# Patient Record
Sex: FEMALE | Race: BLACK OR AFRICAN AMERICAN | NOT HISPANIC OR LATINO | Employment: PART TIME | URBAN - METROPOLITAN AREA
[De-identification: names, ages, dates, MRNs, and addresses within clinical notes are randomized per-mention and may not be internally consistent; named-entity substitution may affect disease eponyms.]

---

## 2017-02-06 ENCOUNTER — OFFICE VISIT (OUTPATIENT)
Dept: ORTHOPEDIC SURGERY | Facility: CLINIC | Age: 62
End: 2017-02-06

## 2017-02-06 VITALS — BODY MASS INDEX: 30.32 KG/M2 | WEIGHT: 182 LBS | TEMPERATURE: 97.8 F | HEIGHT: 65 IN

## 2017-02-06 DIAGNOSIS — M25.552 HIP PAIN, LEFT: Primary | ICD-10-CM

## 2017-02-06 DIAGNOSIS — M25.561 CHRONIC PAIN OF RIGHT KNEE: ICD-10-CM

## 2017-02-06 DIAGNOSIS — G89.29 CHRONIC PAIN OF RIGHT KNEE: ICD-10-CM

## 2017-02-06 PROCEDURE — 99204 OFFICE O/P NEW MOD 45 MIN: CPT | Performed by: ORTHOPAEDIC SURGERY

## 2017-02-06 PROCEDURE — 73502 X-RAY EXAM HIP UNI 2-3 VIEWS: CPT | Performed by: ORTHOPAEDIC SURGERY

## 2017-02-06 PROCEDURE — 73562 X-RAY EXAM OF KNEE 3: CPT | Performed by: ORTHOPAEDIC SURGERY

## 2017-02-06 RX ORDER — DULOXETIN HYDROCHLORIDE 30 MG/1
CAPSULE, DELAYED RELEASE ORAL
Refills: 2 | COMMUNITY
Start: 2017-01-22 | End: 2017-07-13

## 2017-02-06 NOTE — PROGRESS NOTES
New Knee      Patient: Kelsey Nicholson        YOB: 1955    Medical Record Number: [id number]        Chief Complaints:  Right knee and bilateral hips      History of Present Illness: This is a  61 y.o. presents complaining of really right greater than left knee pain and left hip pain.  Her knee pain is been ongoing for 3 months no history of injury or change in activity that she can recall it is primarily medial and lateral.  She does have swelling she has night pain it is a 7 out of 10 she has tried an injection previously in December which helped a little bit she was also placed on Cymbalta she states for her knee she cannot squat has a hard time with stairs.  She is a .  She describes her symptoms as severe constant aching with clicking popping snapping pain with sitting other activities.  Her past medical history marked for depression anxiety.  With regard to her left hip is been ongoing for several years it is lateral hip and buttock pain she cannot associated with activity or with an injury is described as moderate to severe and intermittent.    Allergies:   Allergies   Allergen Reactions   • Statins    • Sulfa Antibiotics    • Oxycodone-Acetaminophen Rash       Medications:   Home Medications:  Current Outpatient Prescriptions on File Prior to Visit   Medication Sig   • Ascorbic Acid (VITAMIN C) 1000 MG tablet Take 1,000 mg by mouth daily.   • chlorthalidone (HYGROTON) 25 MG tablet Take 1 tablet by mouth Daily.   • Cholecalciferol (KP VITAMIN D) 1000 UNITS capsule Take 1,000 Units by mouth daily.   • fluticasone (FLONASE) 50 MCG/ACT nasal spray SHAKE WELL AND USE 2 SPRAYS IN EACH NOSTRIL DAILY   • fluticasone-salmeterol (ADVAIR) 250-50 MCG/DOSE DISKUS Inhale 1 puff 2 (two) times a day. Use one inhalation by mouth twice daily   • gabapentin (NEURONTIN) 300 MG capsule TAKE ONE CAPSULE BY MOUTH THREE TIMES DAILY AS NEEDED   • Ginger, Zingiber officinalis, (GINGER ROOT PO) Take by  mouth.   • Multiple Vitamin (MULTIVITAMIN) tablet Take 1 tablet by mouth daily.   • pantoprazole (PROTONIX) 40 MG EC tablet TAKE 1 TABLET BY MOUTH DAILY EVERY MORNING AND EVERY EVENING   • potassium chloride (K-DUR,KLOR-CON) 10 MEQ CR tablet Take 1 tablet by mouth 2 (Two) Times a Day.   • EPINEPHrine (EPIPEN) 0.3 MG/0.3ML solution auto-injector 0.3 mg once. Inject intramuscularly as directed   • lisinopril (PRINIVIL,ZESTRIL) 10 MG tablet TAKE 1 TABLET BY MOUTH DAILY   • oxybutynin XL (DITROPAN-XL) 10 MG 24 hr tablet    • Scar Treatment Products (RECEDO) gel Apply  topically.     No current facility-administered medications on file prior to visit.      Current Medications:  Scheduled Meds:  Continuous Infusions:  No current facility-administered medications for this visit.   PRN Meds:.    Past Medical History   Diagnosis Date   • Abdominal pain    • Cough    • GERD (gastroesophageal reflux disease)    • Hyperplastic colon polyp    • Irritable bowel syndrome    • Pain of left thumb         Past Surgical History   Procedure Laterality Date   • Breast biopsy     • Cholecystectomy  12/2013   • Hysterectomy     • Urethral suspension     • Oophorectomy     • Rotator cuff repair Left 11/2014        Social History     Occupational History   • Not on file.     Social History Main Topics   • Smoking status: Never Smoker   • Smokeless tobacco: Not on file   • Alcohol use No   • Drug use: Defer   • Sexual activity: Defer    Social History     Social History Narrative        Family History   Problem Relation Age of Onset   • Cancer Father    • Diabetes Father    • Hypertension Father    • Stroke Father    • Heart disease Father              Review of Systems: 14 point review of systems are remarkable for the pertinent positives listed in chart by the patient the remainder are negative    Review of Systems      Physical Exam: 61 y.o. female  General Appearance:    Alert, cooperative, in no acute distress                 Vitals:     "02/06/17 1504   Temp: 97.8 °F (36.6 °C)   TempSrc: Temporal Artery    Weight: 182 lb (82.6 kg)   Height: 65\" (165.1 cm)      Patient is alert and read ×3 no acute distress appears her above-listed at height weight and age.  Affect is normal respiratory rate is normal unlabored. Heart rate regular rate rhythm, sclera, dentition and hearing are normal for the purpose of this exam.        Ortho Exam Physical exam of the right  knee reveals no effusion no redness.  The patient does have tenderness about the medial l joint line.  No tenderness about the lateral l joint line.  A negative bounce home and a positive l medial Ramya.    Patient has a stable ligamentous exam.  The patient has a negative Lachman and negative anterior drawer and a negative pivot shift.  Quads are reasonable and symmetric bilaterally.  Calf is soft and nontender.  There is no overlying skin changes no lymphedema lymphadenopathy.  Patient has good hip range of motion full symmetric and asymptomatic and a normal ankle exam.  She has good distal pulses and sensation distally is intact      Physical exam of the  Left hip.  She has no overlying skin changes no lymphedema lymphadenopathy she has distinct Tenderness over the greater trochanter.  She has a negative straight leg raise negative Lasegue's some pain with Carisa's but in the area the greater trochanter.  She has good range of motion all directions with some pain in range of internal rotation at primarily the area the greater trochanter.    Procedures             Radiology:   AP, Lateral and merchant views of the right knee  were ordered/reviewed to evauateknee pain. I have no comp films.  These show some mild patellofemoral OA otherwise good meds were joint space in neutral alignment.  AP the pelvis lateral left hip were taken to evaluate her hip pain have no comparative films.  These show some mild degenerative changes at the at the hip joint no other acute bony pathology is " seen.      Assessment/Plan: Right knee pain.  I am concerned about meniscal pathology.  She's had an injection the past which she did not get relief from I think the next step would be an MRI she is exhausted most conservative care that I have will proceed with an MRI and she can call me after that test.  With regard to her left hip I think this is more bursitis I did show her how to stretch this I will start her on some Mobic with strict precautions start her into some physical therapy and I will see how she does if she fails to improve with that we would consider an injection versus other means of study/evaluation

## 2017-02-07 PROBLEM — M25.552 HIP PAIN, LEFT: Status: ACTIVE | Noted: 2017-02-07

## 2017-02-07 PROBLEM — M25.561 CHRONIC PAIN OF RIGHT KNEE: Status: ACTIVE | Noted: 2017-02-07

## 2017-02-07 PROBLEM — G89.29 CHRONIC PAIN OF RIGHT KNEE: Status: ACTIVE | Noted: 2017-02-07

## 2017-02-07 RX ORDER — MELOXICAM 15 MG/1
TABLET ORAL
Qty: 30 TABLET | Refills: 3 | Status: SHIPPED | OUTPATIENT
Start: 2017-02-07 | End: 2017-02-07 | Stop reason: SDUPTHER

## 2017-02-08 RX ORDER — MELOXICAM 15 MG/1
TABLET ORAL
Qty: 90 TABLET | Refills: 3 | Status: SHIPPED | OUTPATIENT
Start: 2017-02-08 | End: 2017-07-13

## 2017-02-15 DIAGNOSIS — K58.9 IRRITABLE BOWEL SYNDROME WITHOUT DIARRHEA: ICD-10-CM

## 2017-02-15 RX ORDER — GABAPENTIN 300 MG/1
CAPSULE ORAL
Qty: 270 CAPSULE | Refills: 0 | Status: SHIPPED | OUTPATIENT
Start: 2017-02-15 | End: 2017-07-13 | Stop reason: SDUPTHER

## 2017-02-23 ENCOUNTER — OFFICE VISIT (OUTPATIENT)
Dept: ORTHOPEDIC SURGERY | Facility: CLINIC | Age: 62
End: 2017-02-23

## 2017-02-23 DIAGNOSIS — M25.561 CHRONIC PAIN OF RIGHT KNEE: ICD-10-CM

## 2017-02-23 DIAGNOSIS — G89.29 CHRONIC PAIN OF RIGHT KNEE: ICD-10-CM

## 2017-02-23 PROCEDURE — 73721 MRI JNT OF LWR EXTRE W/O DYE: CPT | Performed by: ORTHOPAEDIC SURGERY

## 2017-02-28 ENCOUNTER — TELEPHONE (OUTPATIENT)
Dept: ORTHOPEDIC SURGERY | Facility: CLINIC | Age: 62
End: 2017-02-28

## 2017-03-01 ENCOUNTER — TELEPHONE (OUTPATIENT)
Dept: ORTHOPEDIC SURGERY | Facility: CLINIC | Age: 62
End: 2017-03-01

## 2017-03-02 NOTE — TELEPHONE ENCOUNTER
Called [facility] telling patient we have her MRI results. If she calls tell her per Dr. Chang it's Normal and she can inject the site if she wants-lck

## 2017-03-08 NOTE — TELEPHONE ENCOUNTER
PER [facility]  MRI RESULTS SHOW BURSITIS. SHE WILL NEED TO MAKE AN FOLLOW UP APPT WITH INJECTION AT THE BURSITIS.

## 2017-03-15 RX ORDER — CHLORTHALIDONE 25 MG/1
TABLET ORAL
Qty: 90 TABLET | Refills: 0 | Status: SHIPPED | OUTPATIENT
Start: 2017-03-15 | End: 2017-06-10 | Stop reason: SDUPTHER

## 2017-04-03 ENCOUNTER — OFFICE VISIT (OUTPATIENT)
Dept: FAMILY MEDICINE CLINIC | Facility: CLINIC | Age: 62
End: 2017-04-03

## 2017-04-03 VITALS
TEMPERATURE: 98.5 F | WEIGHT: 180 LBS | DIASTOLIC BLOOD PRESSURE: 98 MMHG | SYSTOLIC BLOOD PRESSURE: 146 MMHG | OXYGEN SATURATION: 99 % | HEIGHT: 65 IN | BODY MASS INDEX: 29.99 KG/M2 | HEART RATE: 88 BPM

## 2017-04-03 DIAGNOSIS — J40 BRONCHITIS: Primary | ICD-10-CM

## 2017-04-03 DIAGNOSIS — J06.9 VIRAL UPPER RESPIRATORY TRACT INFECTION: ICD-10-CM

## 2017-04-03 DIAGNOSIS — J01.90 ACUTE NON-RECURRENT SINUSITIS, UNSPECIFIED LOCATION: ICD-10-CM

## 2017-04-03 PROCEDURE — 99213 OFFICE O/P EST LOW 20 MIN: CPT | Performed by: FAMILY MEDICINE

## 2017-04-03 RX ORDER — PROMETHAZINE HYDROCHLORIDE AND CODEINE PHOSPHATE 6.25; 1 MG/5ML; MG/5ML
5 SYRUP ORAL EVERY 4 HOURS PRN
Qty: 180 ML | Refills: 0 | Status: SHIPPED | OUTPATIENT
Start: 2017-04-03 | End: 2017-07-13

## 2017-04-03 RX ORDER — CEFDINIR 300 MG/1
300 CAPSULE ORAL 2 TIMES DAILY
Qty: 10 CAPSULE | Refills: 0 | Status: SHIPPED | OUTPATIENT
Start: 2017-04-03 | End: 2017-07-13

## 2017-04-03 RX ORDER — METHYLPREDNISOLONE 4 MG/1
TABLET ORAL
Qty: 21 TABLET | Refills: 0 | Status: SHIPPED | OUTPATIENT
Start: 2017-04-03 | End: 2017-07-13

## 2017-04-03 NOTE — PROGRESS NOTES
"Subjective Patients with a productive cough that has been preset for one week. Using Mucinex.     Kelsey Nicholson is a 61 y.o. female.     Chief Complaint   Patient presents with   • Cough     Social History   Substance Use Topics   • Smoking status: Never Smoker   • Smokeless tobacco: None   • Alcohol use No       History of Present Illness     Acute Bronchitis: Patient presents for presents evaluation of chills, bilateral ear pain, facial pain wheezing, fever, headache  , myalgias, nasal congestion, productive cough and sore throat. Symptoms began 1 week ago and are gradually worsening since that time.  Past history is significant for asthma and occasional episodes of bronchitis.Used mucinex DM, bishnu selzer cold and sinus.Cough is keeping her up as well. Coughing up a lot of phlegm. She's been using her advair regularly. Vicks.     The following portions of the patient's history were reviewed and updated as appropriate: PMHroutine: Social history , Allergies, Current Medications, Active Problem List and Health Maintenance     Review of Systems   Constitutional: Positive for fatigue and fever.   HENT: Positive for sinus pressure and sore throat.    Respiratory: Positive for cough.        Objective   Vitals:    04/03/17 1408   BP: 146/98   Pulse: 88   Temp: 98.5 °F (36.9 °C)   SpO2: 99%   Weight: 180 lb (81.6 kg)   Height: 65\" (165.1 cm)     Body mass index is 29.95 kg/(m^2).    Physical Exam   Constitutional: She appears well-developed and well-nourished.   HENT:   Head: Normocephalic and atraumatic.   Right Ear: Tympanic membrane, external ear and ear canal normal.   Left Ear: Tympanic membrane, external ear and ear canal normal.   Mouth/Throat: Oropharynx is clear and moist.   Eyes: Conjunctivae are normal. Right eye exhibits no discharge. Left eye exhibits no discharge.   Neck: Normal range of motion. Neck supple. No thyromegaly present.   Cardiovascular: Normal rate, regular rhythm and normal heart sounds.  "   Pulmonary/Chest: Effort normal and breath sounds normal. No respiratory distress. She has no wheezes. She has no rales.   Lymphadenopathy:     She has no cervical adenopathy.   Skin: Skin is warm and dry.   Psychiatric: She has a normal mood and affect. Judgment normal.   Vitals reviewed.      Assessment/Plan   Problem List Items Addressed This Visit     None      Visit Diagnoses     Bronchitis    -  Primary    Relevant Medications    cefdinir (OMNICEF) 300 MG capsule    MethylPREDNISolone (MEDROL) 4 MG tablet    promethazine-codeine (PHENERGAN with CODEINE) 6.25-10 MG/5ML syrup    Viral upper respiratory tract infection        Relevant Medications    cefdinir (OMNICEF) 300 MG capsule    MethylPREDNISolone (MEDROL) 4 MG tablet    promethazine-codeine (PHENERGAN with CODEINE) 6.25-10 MG/5ML syrup    Acute non-recurrent sinusitis, unspecified location        Relevant Medications    cefdinir (OMNICEF) 300 MG capsule    MethylPREDNISolone (MEDROL) 4 MG tablet        The patient has read and signed the [facility] Controlled Substance Contract.  I will continue to see patient for regular follow up appointments. The patient is aware of the potential for addiction and dependence.  She is at low risk for dependence or diversion. This is for short term use only. Patient is aware of that. A Devan is obtained today.

## 2017-04-22 DIAGNOSIS — I10 BENIGN ESSENTIAL HYPERTENSION: ICD-10-CM

## 2017-04-22 DIAGNOSIS — R07.2 PRECORDIAL PAIN: ICD-10-CM

## 2017-04-24 RX ORDER — POTASSIUM CHLORIDE 750 MG/1
TABLET, EXTENDED RELEASE ORAL
Qty: 90 TABLET | Refills: 0 | Status: SHIPPED | OUTPATIENT
Start: 2017-04-24 | End: 2017-07-13 | Stop reason: SDUPTHER

## 2017-04-24 RX ORDER — PANTOPRAZOLE SODIUM 40 MG/1
TABLET, DELAYED RELEASE ORAL
Qty: 180 TABLET | Refills: 0 | Status: SHIPPED | OUTPATIENT
Start: 2017-04-24 | End: 2017-07-13

## 2017-06-12 RX ORDER — CHLORTHALIDONE 25 MG/1
TABLET ORAL
Qty: 30 TABLET | Refills: 0 | Status: SHIPPED | OUTPATIENT
Start: 2017-06-12 | End: 2017-07-12 | Stop reason: SDUPTHER

## 2017-06-12 RX ORDER — CHLORTHALIDONE 25 MG/1
TABLET ORAL
Qty: 90 TABLET | Refills: 0 | OUTPATIENT
Start: 2017-06-12

## 2017-07-12 RX ORDER — CHLORTHALIDONE 25 MG/1
TABLET ORAL
Qty: 30 TABLET | Refills: 0 | Status: SHIPPED | OUTPATIENT
Start: 2017-07-12 | End: 2017-07-13 | Stop reason: SDUPTHER

## 2017-07-13 ENCOUNTER — OFFICE VISIT (OUTPATIENT)
Dept: FAMILY MEDICINE CLINIC | Facility: CLINIC | Age: 62
End: 2017-07-13

## 2017-07-13 VITALS
SYSTOLIC BLOOD PRESSURE: 122 MMHG | WEIGHT: 188 LBS | OXYGEN SATURATION: 100 % | BODY MASS INDEX: 31.32 KG/M2 | HEART RATE: 67 BPM | DIASTOLIC BLOOD PRESSURE: 86 MMHG | HEIGHT: 65 IN

## 2017-07-13 DIAGNOSIS — Z00.00 HEALTHCARE MAINTENANCE: ICD-10-CM

## 2017-07-13 DIAGNOSIS — I10 BENIGN ESSENTIAL HYPERTENSION: Primary | ICD-10-CM

## 2017-07-13 DIAGNOSIS — K58.9 IRRITABLE BOWEL SYNDROME WITHOUT DIARRHEA: ICD-10-CM

## 2017-07-13 DIAGNOSIS — E78.2 MIXED HYPERLIPIDEMIA: ICD-10-CM

## 2017-07-13 PROCEDURE — 99213 OFFICE O/P EST LOW 20 MIN: CPT | Performed by: FAMILY MEDICINE

## 2017-07-13 RX ORDER — GABAPENTIN 300 MG/1
300 CAPSULE ORAL 3 TIMES DAILY
Qty: 270 CAPSULE | Refills: 1 | Status: SHIPPED | OUTPATIENT
Start: 2017-07-13 | End: 2019-05-08

## 2017-07-13 RX ORDER — POTASSIUM CHLORIDE 750 MG/1
10 TABLET, EXTENDED RELEASE ORAL 2 TIMES DAILY
Qty: 180 TABLET | Refills: 1 | Status: SHIPPED | OUTPATIENT
Start: 2017-07-13 | End: 2018-05-16 | Stop reason: SDUPTHER

## 2017-07-13 RX ORDER — CHLORTHALIDONE 25 MG/1
25 TABLET ORAL DAILY
Qty: 30 TABLET | Refills: 0 | Status: SHIPPED | OUTPATIENT
Start: 2017-07-13 | End: 2017-09-10 | Stop reason: SDUPTHER

## 2017-07-13 NOTE — PROGRESS NOTES
"Kelsey Nicholson is a 62 y.o. female.  Seen 07/13/2017    Assessment/Plan   Problem List Items Addressed This Visit        Cardiovascular and Mediastinum    Benign essential hypertension - Primary    Relevant Medications    potassium chloride (K-DUR,KLOR-CON) 10 MEQ CR tablet    chlorthalidone (HYGROTON) 25 MG tablet    Hyperlipidemia       Digestive    Irritable bowel syndrome (IBS)    Relevant Medications    gabapentin (NEURONTIN) 300 MG capsule       Other    Healthcare maintenance    Relevant Orders    Comprehensive metabolic panel    Lipid Panel With LDL/HDL Ratio    CBC and Differential             Return in about 6 months (around 1/13/2018).  There are no Patient Instructions on file for this visit.    Vitals:    07/13/17 1150   BP: 122/86   Pulse: 67   SpO2: 100%   Weight: 188 lb (85.3 kg)   Height: 65\" (165.1 cm)     Body mass index is 31.28 kg/(m^2).    Subjective     Chief Complaint   Patient presents with   • Hypertension   • Med Refill     Social History   Substance Use Topics   • Smoking status: Never Smoker   • Smokeless tobacco: Never Used   • Alcohol use No       History of Present Illness     The following portions of the patient's history were reviewed and updated as appropriate:PMHroutine: Social history , Allergies, Current Medications, Active Problem List and Health Maintenance    Review of Systems   Constitutional: Negative for activity change, appetite change, chills, fatigue, fever and unexpected weight change.   HENT: Negative for congestion, ear pain, hearing loss, nosebleeds, rhinorrhea and sore throat.    Eyes: Negative for pain, redness and visual disturbance.   Respiratory: Negative for cough, shortness of breath and wheezing.    Cardiovascular: Negative for chest pain, palpitations and leg swelling.   Gastrointestinal: Negative for abdominal pain, blood in stool, constipation, diarrhea, nausea and vomiting.   Endocrine: Negative for cold intolerance and heat intolerance. "   Genitourinary: Negative for difficulty urinating, dysuria, frequency, hematuria, pelvic pain, urgency and vaginal discharge.   Musculoskeletal: Negative for arthralgias, back pain and joint swelling.   Skin: Negative for rash and wound.   Neurological: Negative for dizziness, weakness, numbness and headaches.   Hematological: Does not bruise/bleed easily.   Psychiatric/Behavioral: Negative for dysphoric mood, sleep disturbance and suicidal ideas. The patient is not nervous/anxious.        Objective   Physical Exam   Constitutional: She is oriented to person, place, and time. Vital signs are normal. She appears well-developed and well-nourished. No distress.   HENT:   Head: Normocephalic.   Cardiovascular: Normal rate, regular rhythm and normal heart sounds.    Pulmonary/Chest: Effort normal and breath sounds normal.   Neurological: She is alert and oriented to person, place, and time. Gait normal.   Psychiatric: She has a normal mood and affect. Her behavior is normal. Judgment and thought content normal.   Vitals reviewed.

## 2017-07-20 DIAGNOSIS — R07.2 PRECORDIAL PAIN: ICD-10-CM

## 2017-07-20 RX ORDER — PANTOPRAZOLE SODIUM 40 MG/1
TABLET, DELAYED RELEASE ORAL
Qty: 180 TABLET | Refills: 0 | Status: SHIPPED | OUTPATIENT
Start: 2017-07-20 | End: 2017-11-06 | Stop reason: SDUPTHER

## 2017-08-16 LAB
ALBUMIN SERPL-MCNC: 4.6 G/DL (ref 3.5–5.2)
ALBUMIN/GLOB SERPL: 1.6 G/DL
ALP SERPL-CCNC: 89 U/L (ref 39–117)
ALT SERPL-CCNC: 33 U/L (ref 1–33)
AST SERPL-CCNC: 33 U/L (ref 1–32)
BASOPHILS # BLD AUTO: 0.05 10*3/MM3 (ref 0–0.2)
BASOPHILS NFR BLD AUTO: 0.6 % (ref 0–1.5)
BILIRUB SERPL-MCNC: 0.4 MG/DL (ref 0.1–1.2)
BUN SERPL-MCNC: 14 MG/DL (ref 8–23)
BUN/CREAT SERPL: 14.3 (ref 7–25)
CALCIUM SERPL-MCNC: 10.2 MG/DL (ref 8.6–10.5)
CHLORIDE SERPL-SCNC: 101 MMOL/L (ref 98–107)
CHOLEST SERPL-MCNC: 292 MG/DL (ref 0–200)
CO2 SERPL-SCNC: 30.2 MMOL/L (ref 22–29)
CREAT SERPL-MCNC: 0.98 MG/DL (ref 0.57–1)
EOSINOPHIL # BLD AUTO: 0.39 10*3/MM3 (ref 0–0.7)
EOSINOPHIL NFR BLD AUTO: 4.3 % (ref 0.3–6.2)
ERYTHROCYTE [DISTWIDTH] IN BLOOD BY AUTOMATED COUNT: 13.9 % (ref 11.7–13)
GLOBULIN SER CALC-MCNC: 2.8 GM/DL
GLUCOSE SERPL-MCNC: 100 MG/DL (ref 65–99)
HCT VFR BLD AUTO: 42.7 % (ref 35.6–45.5)
HDLC SERPL-MCNC: 47 MG/DL (ref 40–60)
HGB BLD-MCNC: 13.3 G/DL (ref 11.9–15.5)
IMM GRANULOCYTES # BLD: 0.02 10*3/MM3 (ref 0–0.03)
IMM GRANULOCYTES NFR BLD: 0.2 % (ref 0–0.5)
LDLC SERPL CALC-MCNC: 204 MG/DL (ref 0–100)
LDLC/HDLC SERPL: 4.35 {RATIO}
LYMPHOCYTES # BLD AUTO: 3.76 10*3/MM3 (ref 0.9–4.8)
LYMPHOCYTES NFR BLD AUTO: 41.4 % (ref 19.6–45.3)
MCH RBC QN AUTO: 30.8 PG (ref 26.9–32)
MCHC RBC AUTO-ENTMCNC: 31.1 G/DL (ref 32.4–36.3)
MCV RBC AUTO: 98.8 FL (ref 80.5–98.2)
MONOCYTES # BLD AUTO: 0.5 10*3/MM3 (ref 0.2–1.2)
MONOCYTES NFR BLD AUTO: 5.5 % (ref 5–12)
NEUTROPHILS # BLD AUTO: 4.37 10*3/MM3 (ref 1.9–8.1)
NEUTROPHILS NFR BLD AUTO: 48 % (ref 42.7–76)
PLATELET # BLD AUTO: 394 10*3/MM3 (ref 140–500)
POTASSIUM SERPL-SCNC: 3.5 MMOL/L (ref 3.5–5.2)
PROT SERPL-MCNC: 7.4 G/DL (ref 6–8.5)
RBC # BLD AUTO: 4.32 10*6/MM3 (ref 3.9–5.2)
SODIUM SERPL-SCNC: 146 MMOL/L (ref 136–145)
TRIGL SERPL-MCNC: 203 MG/DL (ref 0–150)
VLDLC SERPL CALC-MCNC: 40.6 MG/DL (ref 5–40)
WBC # BLD AUTO: 9.09 10*3/MM3 (ref 4.5–10.7)

## 2017-08-17 PROBLEM — R73.9 HYPERGLYCEMIA: Status: ACTIVE | Noted: 2017-08-17

## 2017-09-10 DIAGNOSIS — I10 BENIGN ESSENTIAL HYPERTENSION: ICD-10-CM

## 2017-09-11 RX ORDER — CHLORTHALIDONE 25 MG/1
TABLET ORAL
Qty: 90 TABLET | Refills: 0 | Status: SHIPPED | OUTPATIENT
Start: 2017-09-11 | End: 2017-12-01 | Stop reason: SDUPTHER

## 2017-10-15 DIAGNOSIS — K58.9 IRRITABLE BOWEL SYNDROME WITHOUT DIARRHEA: ICD-10-CM

## 2017-10-17 DIAGNOSIS — J45.909 REACTIVE AIRWAY DISEASE WITHOUT COMPLICATION, UNSPECIFIED ASTHMA SEVERITY, UNSPECIFIED WHETHER PERSISTENT: ICD-10-CM

## 2017-10-19 RX ORDER — GABAPENTIN 300 MG/1
CAPSULE ORAL
Qty: 270 CAPSULE | Refills: 1 | OUTPATIENT
Start: 2017-10-19 | End: 2018-03-26 | Stop reason: SDUPTHER

## 2017-11-06 DIAGNOSIS — R07.2 PRECORDIAL PAIN: ICD-10-CM

## 2017-11-08 RX ORDER — PANTOPRAZOLE SODIUM 40 MG/1
TABLET, DELAYED RELEASE ORAL
Qty: 180 TABLET | Refills: 0 | Status: SHIPPED | OUTPATIENT
Start: 2017-11-08 | End: 2018-02-03 | Stop reason: SDUPTHER

## 2017-12-01 DIAGNOSIS — I10 BENIGN ESSENTIAL HYPERTENSION: ICD-10-CM

## 2017-12-04 RX ORDER — CHLORTHALIDONE 25 MG/1
TABLET ORAL
Qty: 90 TABLET | Refills: 0 | Status: SHIPPED | OUTPATIENT
Start: 2017-12-04 | End: 2018-03-03 | Stop reason: SDUPTHER

## 2018-02-03 DIAGNOSIS — R07.2 PRECORDIAL PAIN: ICD-10-CM

## 2018-02-05 RX ORDER — PANTOPRAZOLE SODIUM 40 MG/1
TABLET, DELAYED RELEASE ORAL
Qty: 180 TABLET | Refills: 0 | Status: SHIPPED | OUTPATIENT
Start: 2018-02-05 | End: 2018-03-26 | Stop reason: SDUPTHER

## 2018-03-03 DIAGNOSIS — I10 BENIGN ESSENTIAL HYPERTENSION: ICD-10-CM

## 2018-03-05 DIAGNOSIS — I10 BENIGN ESSENTIAL HYPERTENSION: ICD-10-CM

## 2018-03-05 RX ORDER — CHLORTHALIDONE 25 MG/1
TABLET ORAL
Qty: 90 TABLET | Refills: 0 | OUTPATIENT
Start: 2018-03-05

## 2018-03-05 RX ORDER — CHLORTHALIDONE 25 MG/1
TABLET ORAL
Qty: 30 TABLET | Refills: 0 | Status: SHIPPED | OUTPATIENT
Start: 2018-03-05 | End: 2018-03-26 | Stop reason: SDUPTHER

## 2018-03-26 ENCOUNTER — OFFICE VISIT (OUTPATIENT)
Dept: INTERNAL MEDICINE | Facility: CLINIC | Age: 63
End: 2018-03-26

## 2018-03-26 VITALS
HEART RATE: 80 BPM | WEIGHT: 189 LBS | SYSTOLIC BLOOD PRESSURE: 130 MMHG | BODY MASS INDEX: 31.49 KG/M2 | TEMPERATURE: 98 F | DIASTOLIC BLOOD PRESSURE: 70 MMHG | OXYGEN SATURATION: 97 % | HEIGHT: 65 IN

## 2018-03-26 DIAGNOSIS — E55.9 VITAMIN D DEFICIENCY: ICD-10-CM

## 2018-03-26 DIAGNOSIS — J30.2 CHRONIC SEASONAL ALLERGIC RHINITIS, UNSPECIFIED TRIGGER: ICD-10-CM

## 2018-03-26 DIAGNOSIS — I10 BENIGN ESSENTIAL HYPERTENSION: Primary | ICD-10-CM

## 2018-03-26 DIAGNOSIS — K58.9 IRRITABLE BOWEL SYNDROME WITHOUT DIARRHEA: ICD-10-CM

## 2018-03-26 DIAGNOSIS — K21.9 GASTROESOPHAGEAL REFLUX DISEASE, ESOPHAGITIS PRESENCE NOT SPECIFIED: ICD-10-CM

## 2018-03-26 DIAGNOSIS — E66.9 CLASS 1 OBESITY WITHOUT SERIOUS COMORBIDITY WITH BODY MASS INDEX (BMI) OF 31.0 TO 31.9 IN ADULT, UNSPECIFIED OBESITY TYPE: ICD-10-CM

## 2018-03-26 DIAGNOSIS — Z11.59 NEED FOR HEPATITIS C SCREENING TEST: ICD-10-CM

## 2018-03-26 PROCEDURE — 99214 OFFICE O/P EST MOD 30 MIN: CPT | Performed by: FAMILY MEDICINE

## 2018-03-26 RX ORDER — CHLORTHALIDONE 25 MG/1
25 TABLET ORAL DAILY
Qty: 90 TABLET | Refills: 1 | Status: SHIPPED | OUTPATIENT
Start: 2018-03-26 | End: 2018-06-01

## 2018-03-26 NOTE — PROGRESS NOTES
Subjective   Kelsey Nicholson is a 62 y.o. female.   Chief Complaint   Patient presents with   • Hypertension   • Heartburn   • Vitamin D Deficiency   • Allergic Rhinitis       History of Present Illness     This is a new patient in our office.      #1 hypertension-diagnosed years ago.  Patient is on chlorthalidone at 25 mg a day +10 mEq of potassium taken once a day.  It is prescribed twice a day, patient takes it once a day.  She has no chest pain, no shortness of breath, no dizziness, no palpitations.  She started to exercise recently.  She exercises twice a week for 1-2 hours of cardio exercise and weights.  She does not smoke cigarettes.  She never did.  She is trying to use low-salt diet.  She has no cardiac history except of mitral valve prolapse.      #2 GERD/IBS-patient is on Protonix 40 mg twice a day for GERD.  Over last year her symptoms are uncontrolled.  She has belching, burning sensation in esophagus and a bitter taste on and off.  For IBS she is on Neurontin.  She takes 300 mg at bedtime plus as needed for flareups.  She saw Dr. Martinez in the past and is requesting referral to see him.    #3 allergic rhinitis-seasonal.  Spring and fall.  She was never tested.  She takes Flonase over-the-counter.  She takes it only during the spring and fall seasons.  It controls her symptoms.  She has no nosebleeds.  Occasionally she has dryness in nose.    #4 vitamin D deficiency-patient takes vitamin D 3 at 2000 units a day.  She takes it everyday.      For more history please see health history form which was reviewed by me and will be scanned.    The following portions of the patient's history were reviewed and updated as appropriate: allergies, current medications, past family history, past medical history, past social history, past surgical history and problem list.    Review of Systems   Constitutional: Negative.    Respiratory: Negative.    Cardiovascular: Negative.    Neurological: Negative.           Objective   Wt Readings from Last 3 Encounters:   03/26/18 85.7 kg (189 lb)   07/13/17 85.3 kg (188 lb)   04/03/17 81.6 kg (180 lb)      Vitals:    03/26/18 1106   BP: 130/70   Pulse: 80   Temp: 98 °F (36.7 °C)   SpO2: 97%     Temp Readings from Last 3 Encounters:   03/26/18 98 °F (36.7 °C)   04/03/17 98.5 °F (36.9 °C)   02/06/17 97.8 °F (36.6 °C) (Temporal Artery )     BP Readings from Last 3 Encounters:   03/26/18 130/70   07/13/17 122/86   04/03/17 146/98     Pulse Readings from Last 3 Encounters:   03/26/18 80   07/13/17 67   04/03/17 88       Physical Exam   Constitutional: She is oriented to person, place, and time. She appears well-developed and well-nourished.   HENT:   Head: Normocephalic and atraumatic.   Neck: Neck supple. Carotid bruit is not present. No thyromegaly present.   Cardiovascular: Normal rate, regular rhythm and normal heart sounds.    Pulmonary/Chest: Effort normal and breath sounds normal.   Neurological: She is alert and oriented to person, place, and time.   Skin: Skin is warm, dry and intact.   Psychiatric: She has a normal mood and affect. Her behavior is normal.       Assessment/Plan   Kelsey was seen today for hypertension, heartburn, vitamin d deficiency and allergic rhinitis.    Diagnoses and all orders for this visit:    Benign essential hypertension  -     Comprehensive Metabolic Panel  -     Lipid Panel With LDL / HDL Ratio  -     Thyroid Cascade Profile  -     Vitamin D 25 Hydroxy  -     chlorthalidone (HYGROTON) 25 MG tablet; Take 1 tablet by mouth Daily.    Vitamin D deficiency  -     Thyroid Cascade Profile    Gastroesophageal reflux disease, esophagitis presence not specified  -     Ambulatory Referral to Gastroenterology    Irritable bowel syndrome without diarrhea  -     Ambulatory Referral to Gastroenterology    Chronic seasonal allergic rhinitis, unspecified trigger    Class 1 obesity without serious comorbidity with body mass index (BMI) of 31.0 to 31.9 in  adult, unspecified obesity type  -     Ambulatory Referral to Nutrition Services  -     Comprehensive Metabolic Panel  -     Lipid Panel With LDL / HDL Ratio  -     Thyroid Cascade Profile  -     Vitamin D 25 Hydroxy    Need for hepatitis C screening test  -     Hepatitis C Antibody        #1 hypertension-check labs.  Continue current treatment.  Follow-up in 6 months.      #2 allergic rhinitis-continue current treatment.  Follow-up in 12 months.     #3 vitamin D deficiency-check labs.  Continue current treatment.  Follow-up in 6-12 months.      #4 GERD/IBS-referral to GI.  I am deferring treatment with Neurontin to specialist. It is out of label use of controlled substance and I'm not comfortable prescribing it.    #5 obesity-increased risk for developing diabetes and heart disease.  Referral to nutritionist. Increase exercise to 5 days a week.

## 2018-03-27 LAB
25(OH)D3+25(OH)D2 SERPL-MCNC: 81 NG/ML (ref 30–100)
ALBUMIN SERPL-MCNC: 4.7 G/DL (ref 3.5–5.2)
ALBUMIN/GLOB SERPL: 1.6 G/DL
ALP SERPL-CCNC: 100 U/L (ref 39–117)
ALT SERPL-CCNC: 25 U/L (ref 1–33)
AST SERPL-CCNC: 25 U/L (ref 1–32)
BILIRUB SERPL-MCNC: 0.5 MG/DL (ref 0.1–1.2)
BUN SERPL-MCNC: 13 MG/DL (ref 8–23)
BUN/CREAT SERPL: 15.5 (ref 7–25)
CALCIUM SERPL-MCNC: 9.8 MG/DL (ref 8.6–10.5)
CHLORIDE SERPL-SCNC: 97 MMOL/L (ref 98–107)
CHOLEST SERPL-MCNC: 312 MG/DL (ref 0–200)
CO2 SERPL-SCNC: 33.2 MMOL/L (ref 22–29)
CREAT SERPL-MCNC: 0.84 MG/DL (ref 0.57–1)
GFR SERPLBLD CREATININE-BSD FMLA CKD-EPI: 69 ML/MIN/1.73
GFR SERPLBLD CREATININE-BSD FMLA CKD-EPI: 84 ML/MIN/1.73
GLOBULIN SER CALC-MCNC: 2.9 GM/DL
GLUCOSE SERPL-MCNC: 96 MG/DL (ref 65–99)
HCV AB S/CO SERPL IA: 0.1 S/CO RATIO (ref 0–0.9)
HDLC SERPL-MCNC: 55 MG/DL (ref 40–60)
LDLC SERPL CALC-MCNC: 227 MG/DL (ref 0–100)
LDLC/HDLC SERPL: 4.13 {RATIO}
POTASSIUM SERPL-SCNC: 3.5 MMOL/L (ref 3.5–5.2)
PROT SERPL-MCNC: 7.6 G/DL (ref 6–8.5)
SODIUM SERPL-SCNC: 145 MMOL/L (ref 136–145)
TRIGL SERPL-MCNC: 148 MG/DL (ref 0–150)
TSH SERPL DL<=0.005 MIU/L-ACNC: 2.75 UIU/ML (ref 0.45–4.5)
VLDLC SERPL CALC-MCNC: 29.6 MG/DL (ref 5–40)

## 2018-04-10 ENCOUNTER — OFFICE VISIT (OUTPATIENT)
Dept: INTERNAL MEDICINE | Facility: CLINIC | Age: 63
End: 2018-04-10

## 2018-04-10 VITALS
WEIGHT: 189 LBS | TEMPERATURE: 98 F | HEART RATE: 69 BPM | HEIGHT: 65 IN | OXYGEN SATURATION: 97 % | SYSTOLIC BLOOD PRESSURE: 130 MMHG | BODY MASS INDEX: 31.49 KG/M2 | DIASTOLIC BLOOD PRESSURE: 80 MMHG

## 2018-04-10 DIAGNOSIS — E78.00 PURE HYPERCHOLESTEROLEMIA: Primary | ICD-10-CM

## 2018-04-10 PROCEDURE — 99213 OFFICE O/P EST LOW 20 MIN: CPT | Performed by: FAMILY MEDICINE

## 2018-04-10 RX ORDER — PRAVASTATIN SODIUM 10 MG
10 TABLET ORAL DAILY
Qty: 90 TABLET | Refills: 0 | OUTPATIENT
Start: 2018-04-10

## 2018-04-10 RX ORDER — PRAVASTATIN SODIUM 10 MG
10 TABLET ORAL DAILY
Qty: 30 TABLET | Refills: 1 | Status: SHIPPED | OUTPATIENT
Start: 2018-04-10 | End: 2018-05-16

## 2018-04-10 NOTE — PROGRESS NOTES
Subjective   Kelsey Nicholson is a 63 y.o. female.   Chief Complaint   Patient presents with   • Hyperlipidemia       History of Present Illness     #1 HLP- LDL is at 227 from 204, from 179.  HDL 55.  Triglycerides normal.  Patient was treated in the past with Crestor.  She had muscle cramps and remember that liver enzymes were elevated, she does not remember how high they were.      The following portions of the patient's history were reviewed and updated as appropriate: allergies, current medications, past family history, past medical history, past social history, past surgical history and problem list.    Review of Systems   Constitutional: Negative.    Respiratory: Negative.    Cardiovascular: Negative.          Objective   Wt Readings from Last 3 Encounters:   04/10/18 85.7 kg (189 lb)   03/26/18 85.7 kg (189 lb)   07/13/17 85.3 kg (188 lb)      Vitals:    04/10/18 1150   BP: 130/80   Pulse: 69   Temp: 98 °F (36.7 °C)   SpO2: 97%     Temp Readings from Last 3 Encounters:   04/10/18 98 °F (36.7 °C)   03/26/18 98 °F (36.7 °C)   04/03/17 98.5 °F (36.9 °C)     BP Readings from Last 3 Encounters:   04/10/18 130/80   03/26/18 130/70   07/13/17 122/86     Pulse Readings from Last 3 Encounters:   04/10/18 69   03/26/18 80   07/13/17 67       Physical Exam   Constitutional: She is oriented to person, place, and time. She appears well-developed and well-nourished.   HENT:   Head: Normocephalic and atraumatic.   Neck: Neck supple. Carotid bruit is not present. No thyromegaly present.   Cardiovascular: Normal rate, regular rhythm and normal heart sounds.    Pulmonary/Chest: Effort normal and breath sounds normal.   Neurological: She is alert and oriented to person, place, and time.   Skin: Skin is warm, dry and intact.   Psychiatric: She has a normal mood and affect. Her behavior is normal.       Assessment/Plan   Kelsey was seen today for hyperlipidemia.    Diagnoses and all orders for this visit:    Pure  hypercholesterolemia  -     Lipid Panel With LDL / HDL Ratio; Future  -     Comprehensive Metabolic Panel; Future    Other orders  -     pravastatin (PRAVACHOL) 10 MG tablet; Take 1 tablet by mouth Daily.        #1 Hyperlipidemia-patient did not tolerate Crestor, but her cholesterol is very high and we are going to try low dose of low intensity statin.  Will try pravastatin at 10 mg a day.  We'll check labs in one month before office visit.  If patient develops severe muscle cramps/aches she will stop it.  Patient is in agreement with it.

## 2018-04-11 ENCOUNTER — OFFICE VISIT (OUTPATIENT)
Dept: GASTROENTEROLOGY | Facility: CLINIC | Age: 63
End: 2018-04-11

## 2018-04-11 VITALS
HEIGHT: 65 IN | BODY MASS INDEX: 31.32 KG/M2 | WEIGHT: 188 LBS | DIASTOLIC BLOOD PRESSURE: 70 MMHG | TEMPERATURE: 98.5 F | SYSTOLIC BLOOD PRESSURE: 110 MMHG

## 2018-04-11 DIAGNOSIS — K63.5 HYPERPLASTIC COLONIC POLYP, UNSPECIFIED PART OF COLON: ICD-10-CM

## 2018-04-11 DIAGNOSIS — R14.0 ABDOMINAL BLOATING: ICD-10-CM

## 2018-04-11 DIAGNOSIS — R12 HEARTBURN: Primary | ICD-10-CM

## 2018-04-11 DIAGNOSIS — Z83.71 FAMILY HISTORY OF POLYPS IN THE COLON: ICD-10-CM

## 2018-04-11 DIAGNOSIS — Z80.0 FAMILY HISTORY OF GI MALIGNANCY: ICD-10-CM

## 2018-04-11 DIAGNOSIS — R68.81 EARLY SATIETY: ICD-10-CM

## 2018-04-11 PROCEDURE — 99203 OFFICE O/P NEW LOW 30 MIN: CPT | Performed by: INTERNAL MEDICINE

## 2018-04-11 RX ORDER — LANOLIN ALCOHOL/MO/W.PET/CERES
400 CREAM (GRAM) TOPICAL DAILY
COMMUNITY
End: 2021-02-19

## 2018-04-11 NOTE — PROGRESS NOTES
Chief Complaint   Patient presents with   • New patient   • Belching       Kelsey Nicholson is a 63 y.o. female who presents with Belching and bloating with weight gain, GERD, hyperplastic colon polyp, family history of colon cancer in her father, last colonoscopy 2013    Colonoscopy 2013 1 hyperplastic polyp  EGD with gastritis, H. pylori negative  Family history of colon cancer in her father      Heartburn   She complains of abdominal pain, belching, chest pain, choking, coughing, early satiety, heartburn, a hoarse voice, nausea, a sore throat and water brash. She reports no dysphagia, no globus sensation, no stridor, no tooth decay or no wheezing. This is a recurrent problem. The current episode started more than 1 month ago. The problem occurs frequently. The problem has been gradually worsening. The heartburn duration is more than one hour. The heartburn is located in the substernum. The heartburn is of moderate intensity. The heartburn wakes her from sleep. The heartburn limits her activity. The heartburn changes with position. The symptoms are aggravated by certain foods and lying down. Associated symptoms include fatigue. Pertinent negatives include no anemia, melena, orthopnea or weight loss. There are no known risk factors. She has tried a PPI for the symptoms. The treatment provided mild relief. Past procedures do not include an abdominal ultrasound, an EGD, esophageal manometry, esophageal pH monitoring, H. pylori antibody titer or a UGI.       Past Medical History:   Diagnosis Date   • Abdominal pain    • Cough    • GERD (gastroesophageal reflux disease)    • Hyperlipidemia    • Hyperplastic colon polyp    • Hypertension    • Irritable bowel syndrome    • Mitral valve prolapse 2000   • Osteoarthritis    • Pain of left thumb        Past Surgical History:   Procedure Laterality Date   • BREAST BIOPSY     • CHOLECYSTECTOMY  12/2013   • COLONOSCOPY  10/16/2013    IH, Hyperplastic polyp    • ENDOSCOPY   10/16/2013    gastritis.     • HYSTERECTOMY     • OOPHORECTOMY     • ROTATOR CUFF REPAIR Left 11/2014   • URETHRAL SUSPENSION           Current Outpatient Prescriptions:   •  Ascorbic Acid (VITAMIN C) 1000 MG tablet, Take 1,000 mg by mouth daily., Disp: , Rfl:   •  B Complex-C-E-Zn (B COMPLEX-C-E-ZINC) tablet, Take 1 tablet by mouth Daily., Disp: , Rfl:   •  chlorthalidone (HYGROTON) 25 MG tablet, Take 1 tablet by mouth Daily., Disp: 90 tablet, Rfl: 1  •  Cholecalciferol 1000 UNITS capsule, Take  by mouth., Disp: , Rfl:   •  EPINEPHrine (EPIPEN) 0.3 MG/0.3ML solution auto-injector, 0.3 mg once. Inject intramuscularly as directed, Disp: , Rfl:   •  Estrogens, Conjugated (PREMARIN VA), Insert  into the vagina., Disp: , Rfl:   •  fluticasone (FLONASE) 50 MCG/ACT nasal spray, SHAKE WELL AND USE 2 SPRAYS IN EACH NOSTRIL DAILY, Disp: 16 g, Rfl: 11  •  folic acid (FOLVITE) 400 MCG tablet, Take 400 mcg by mouth Daily., Disp: , Rfl:   •  gabapentin (NEURONTIN) 300 MG capsule, Take 1 capsule by mouth 3 (Three) Times a Day. (Patient taking differently: Take 300 mg by mouth Daily.), Disp: 270 capsule, Rfl: 1  •  Komal, Zingiber officinalis, (KOMAL ROOT PO), Take by mouth., Disp: , Rfl:   •  Multiple Vitamin (MULTIVITAMIN) tablet, Take 1 tablet by mouth daily., Disp: , Rfl:   •  pantoprazole (PROTONIX) 40 MG EC tablet, TAKE 1 TABLET BY MOUTH DAILY EVERY MORNING AND EVERY EVENING, Disp: 180 tablet, Rfl: 3  •  potassium chloride (K-DUR,KLOR-CON) 10 MEQ CR tablet, Take 1 tablet by mouth 2 (Two) Times a Day., Disp: 180 tablet, Rfl: 1  •  pravastatin (PRAVACHOL) 10 MG tablet, Take 1 tablet by mouth Daily., Disp: 30 tablet, Rfl: 1  •  Zinc Sulfate 66 MG tablet, Take  by mouth., Disp: , Rfl:     Allergies   Allergen Reactions   • Statins    • Sulfa Antibiotics    • Oxycodone-Acetaminophen Rash       Social History     Social History   • Marital status: Single     Spouse name: N/A   • Number of children: N/A   • Years of education:  N/A     Occupational History   • Not on file.     Social History Main Topics   • Smoking status: Never Smoker   • Smokeless tobacco: Never Used   • Alcohol use No   • Drug use: Unknown   • Sexual activity: Defer     Other Topics Concern   • Not on file     Social History Narrative   • No narrative on file       Family History   Problem Relation Age of Onset   • Cancer Father    • Diabetes Father    • Hypertension Father    • Stroke Father    • Heart disease Father        Review of Systems   Constitutional: Positive for fatigue. Negative for weight loss.   HENT: Positive for hoarse voice and sore throat.    Respiratory: Positive for cough and choking. Negative for wheezing.    Cardiovascular: Positive for chest pain.   Gastrointestinal: Positive for abdominal pain, heartburn and nausea. Negative for dysphagia and melena.   All other systems reviewed and are negative.      Vitals:    04/11/18 1351   BP: 110/70   Temp: 98.5 °F (36.9 °C)       Physical Exam   Constitutional: She is oriented to person, place, and time. She appears well-developed and well-nourished.   HENT:   Head: Normocephalic and atraumatic.   Eyes: Pupils are equal, round, and reactive to light.   Cardiovascular: Normal rate, regular rhythm and normal heart sounds.    Pulmonary/Chest: Effort normal and breath sounds normal.   Abdominal: Soft. Bowel sounds are normal. She exhibits no shifting dullness, no distension, no pulsatile liver, no fluid wave, no abdominal bruit, no ascites, no pulsatile midline mass and no mass. There is no hepatosplenomegaly. There is no tenderness. There is no rigidity and no guarding. No hernia.   Musculoskeletal: Normal range of motion.   Neurological: She is alert and oriented to person, place, and time.   Skin: Skin is warm and dry.   Psychiatric: She has a normal mood and affect. Her behavior is normal. Thought content normal.   Nursing note and vitals reviewed.      No images are attached to the encounter.    Problem  list    GERD refractory  Dyspepsia and belching  Bloating and  Family history of colon cancer in her father  Hyperplastic polyp removed from the left colon 5 years ago      Assessment/Plan    Based on the above issues we will schedule EGD and colonoscopy  An EGD and  colonoscopy will be scheduled by my staff, the instructions will either be handed to you or mailed to you.  You'll receive an appointment date and time.  You will need to bring a  with you on that day to drive you home.    Continue twice a day PPI  FD guard as needed and samples given

## 2018-04-16 DIAGNOSIS — E78.2 MIXED HYPERLIPIDEMIA: Primary | ICD-10-CM

## 2018-04-17 DIAGNOSIS — I10 BENIGN ESSENTIAL HYPERTENSION: ICD-10-CM

## 2018-04-17 RX ORDER — POTASSIUM CHLORIDE 750 MG/1
TABLET, EXTENDED RELEASE ORAL
Qty: 180 TABLET | Refills: 0 | OUTPATIENT
Start: 2018-04-17

## 2018-04-25 ENCOUNTER — TELEPHONE (OUTPATIENT)
Dept: GASTROENTEROLOGY | Facility: CLINIC | Age: 63
End: 2018-04-25

## 2018-04-25 ENCOUNTER — OUTSIDE FACILITY SERVICE (OUTPATIENT)
Dept: GASTROENTEROLOGY | Facility: CLINIC | Age: 63
End: 2018-04-25

## 2018-04-25 PROCEDURE — 43239 EGD BIOPSY SINGLE/MULTIPLE: CPT | Performed by: INTERNAL MEDICINE

## 2018-04-25 PROCEDURE — 45380 COLONOSCOPY AND BIOPSY: CPT | Performed by: INTERNAL MEDICINE

## 2018-04-25 RX ORDER — SUCRALFATE 1 G/1
1 TABLET ORAL
Qty: 60 TABLET | Refills: 2 | Status: SHIPPED | OUTPATIENT
Start: 2018-04-25 | End: 2018-06-11 | Stop reason: SDUPTHER

## 2018-04-25 NOTE — TELEPHONE ENCOUNTER
----- Message from Jose Enrique Quesada MD sent at 4/25/2018 12:24 PM EDT -----  Regarding: rx  Call in carafate pills one gram po bid #[phone] 2 rf

## 2018-05-04 ENCOUNTER — TELEPHONE (OUTPATIENT)
Dept: GASTROENTEROLOGY | Facility: CLINIC | Age: 63
End: 2018-05-04

## 2018-05-04 NOTE — TELEPHONE ENCOUNTER
Patient called, advised as per Dr. Quesada her colon polyp biopsy showed adenoma and will need to repeat her colonoscopy in 5 years. Advised she will need to continue taking her Pantoprazole for her stomach ulcers.  F/u office visit scheduled with Belkys AVILA for 6/11/[id number]. Patient verb understanding and is in agreement with the plan.   Patient's health maintenance record updated to reflect the need to repeat colonoscopy in 5 years.

## 2018-05-04 NOTE — TELEPHONE ENCOUNTER
----- Message from Jose Enrique Quesada MD sent at 5/1/2018 12:15 PM EDT -----  Adenoma colon polyp, colonoscopy recall 5 years  Continue PPI for ulcers  Office visit nurse practitioner 6 daily

## 2018-05-10 ENCOUNTER — RESULTS ENCOUNTER (OUTPATIENT)
Dept: INTERNAL MEDICINE | Facility: CLINIC | Age: 63
End: 2018-05-10

## 2018-05-10 DIAGNOSIS — E78.00 PURE HYPERCHOLESTEROLEMIA: ICD-10-CM

## 2018-05-16 ENCOUNTER — OFFICE VISIT (OUTPATIENT)
Dept: CARDIOLOGY | Facility: CLINIC | Age: 63
End: 2018-05-16

## 2018-05-16 VITALS
WEIGHT: 185 LBS | SYSTOLIC BLOOD PRESSURE: 128 MMHG | BODY MASS INDEX: 30.82 KG/M2 | DIASTOLIC BLOOD PRESSURE: 82 MMHG | HEIGHT: 65 IN | HEART RATE: 63 BPM

## 2018-05-16 DIAGNOSIS — I10 BENIGN ESSENTIAL HYPERTENSION: ICD-10-CM

## 2018-05-16 DIAGNOSIS — E78.00 PURE HYPERCHOLESTEROLEMIA: ICD-10-CM

## 2018-05-16 DIAGNOSIS — I34.1 MVP (MITRAL VALVE PROLAPSE): Primary | ICD-10-CM

## 2018-05-16 DIAGNOSIS — R94.31 ABNORMAL EKG: ICD-10-CM

## 2018-05-16 PROCEDURE — 99204 OFFICE O/P NEW MOD 45 MIN: CPT | Performed by: INTERNAL MEDICINE

## 2018-05-16 PROCEDURE — 93000 ELECTROCARDIOGRAM COMPLETE: CPT | Performed by: INTERNAL MEDICINE

## 2018-05-16 RX ORDER — POTASSIUM CHLORIDE 20 MEQ/1
20 TABLET, EXTENDED RELEASE ORAL DAILY
COMMUNITY
End: 2018-06-01

## 2018-05-16 NOTE — PROGRESS NOTES
Date of Office Visit: 2018  Encounter Provider: Kaylah Blancas MD  Place of Service: [facility]  Patient Name: Kelsey Nicholson  :1955    Chief complaint  Consult requested by Dr. Dimas for evaluation of mitral valve disease, possible mitral valve prolapse and management of hyperlipidemia.    History of Present Illness  The patient is a pleasant 63-year-old with history of hypertension, hyperlipidemia. In the recent past she was told that she had mitral valve prolapse and had transesophageal echocardiogram performed with normal mitral john structure with only trivial regurgitation. She was unaware of this. She also has history of hyperlipidemia.  Her TSH was not recently checked as best I can tell, but in 2016 it was normal. She was started on pravastatin at a low dose. She is on a low cholesterol diet. She is not exercising but is active, walking up to 10,000 steps a day she believes.    Past Medical History:   Diagnosis Date   • Abdominal pain    • Cough    • GERD (gastroesophageal reflux disease)    • Hyperlipidemia    • Hyperplastic colon polyp    • Hypertension    • Irritable bowel syndrome    • Mitral valve prolapse    • Osteoarthritis    • Pain of left thumb      Past Surgical History:   Procedure Laterality Date   • BREAST BIOPSY     • CHOLECYSTECTOMY  2013   • COLONOSCOPY  10/16/2013    IH, Hyperplastic polyp    • ENDOSCOPY  10/16/2013    gastritis.     • HYSTERECTOMY     • OOPHORECTOMY     • ROTATOR CUFF REPAIR Left 2014   • URETHRAL SUSPENSION       Outpatient Medications Prior to Visit   Medication Sig Dispense Refill   • Ascorbic Acid (VITAMIN C) 1000 MG tablet Take 1,000 mg by mouth daily.     • B Complex-C-E-Zn (B COMPLEX-C-E-ZINC) tablet Take 1 tablet by mouth Daily.     • chlorthalidone (HYGROTON) 25 MG tablet Take 1 tablet by mouth Daily. 90 tablet 1   • Cholecalciferol 1000 UNITS capsule Take  by mouth.     • EPINEPHrine (EPIPEN) 0.3  MG/0.3ML solution auto-injector 0.3 mg once. Inject intramuscularly as directed     • fluticasone (FLONASE) 50 MCG/ACT nasal spray SHAKE WELL AND USE 2 SPRAYS IN EACH NOSTRIL DAILY 16 g 11   • folic acid (FOLVITE) 400 MCG tablet Take 400 mcg by mouth Daily.     • gabapentin (NEURONTIN) 300 MG capsule Take 1 capsule by mouth 3 (Three) Times a Day. (Patient taking differently: Take 300 mg by mouth Daily.) 270 capsule 1   • Komal, Zingiber officinalis, (KOMAL ROOT PO) Take by mouth.     • Multiple Vitamin (MULTIVITAMIN) tablet Take 1 tablet by mouth daily.     • pantoprazole (PROTONIX) 40 MG EC tablet TAKE 1 TABLET BY MOUTH DAILY EVERY MORNING AND EVERY EVENING 180 tablet 3   • sucralfate (CARAFATE) 1 g tablet Take 1 tablet by mouth 2 (Two) Times a Day Before Meals. 60 tablet 2   • Zinc Sulfate 66 MG tablet Take  by mouth.     • Estrogens, Conjugated (PREMARIN VA) Insert  into the vagina.     • potassium chloride (K-DUR,KLOR-CON) 10 MEQ CR tablet Take 1 tablet by mouth 2 (Two) Times a Day. 180 tablet 1   • pravastatin (PRAVACHOL) 10 MG tablet Take 1 tablet by mouth Daily. 30 tablet 1     No facility-administered medications prior to visit.        Allergies as of 05/16/2018 - Reviewed 05/16/2018   Allergen Reaction Noted   • Statins  10/20/2015   • Sulfa antibiotics  06/23/2014   • Oxycodone-acetaminophen Rash 11/28/2016     Social History     Social History   • Marital status: Single     Spouse name: N/A   • Number of children: N/A   • Years of education: N/A     Occupational History   • Not on file.     Social History Main Topics   • Smoking status: Never Smoker   • Smokeless tobacco: Never Used   • Alcohol use No   • Drug use: Unknown   • Sexual activity: Defer     Other Topics Concern   • Not on file     Social History Narrative   • No narrative on file     Family History   Problem Relation Age of Onset   • Cancer Father    • Diabetes Father    • Hypertension Father    • Stroke Father    • Heart disease Father   "    Review of Systems   Constitution: Negative for fever, malaise/fatigue, weight gain and weight loss.   HENT: Negative for ear pain, hearing loss, nosebleeds and sore throat.    Eyes: Negative for double vision, pain, vision loss in left eye and vision loss in right eye.   Cardiovascular:        See history of present illness.   Respiratory: Negative for cough, shortness of breath, sleep disturbances due to breathing, snoring and wheezing.    Endocrine: Negative for cold intolerance, heat intolerance and polyuria.   Skin: Negative for itching, poor wound healing and rash.   Musculoskeletal: Positive for joint pain, joint swelling and myalgias.   Gastrointestinal: Negative for abdominal pain, diarrhea, hematochezia, nausea and vomiting.   Genitourinary: Negative for hematuria and hesitancy.   Neurological: Negative for numbness, paresthesias and seizures.   Psychiatric/Behavioral: Negative for depression. The patient is not nervous/anxious.         Objective:     Vitals:    05/16/18 0857 05/16/18 0908   BP: 132/86 128/82   BP Location: Right arm Left arm   Pulse: 63    Weight: 83.9 kg (185 lb)    Height: 165.1 cm (65\")      Body mass index is 30.79 kg/m².    Physical Exam   Constitutional: She is oriented to person, place, and time. She appears well-developed and well-nourished.   Overweight   HENT:   Head: Normocephalic.   Nose: Nose normal.   Mouth/Throat: Oropharynx is clear and moist.   Eyes: Conjunctivae and EOM are normal. Pupils are equal, round, and reactive to light. Right eye exhibits no discharge. No scleral icterus.   Neck: Normal range of motion. Neck supple. No JVD present. No thyromegaly present.   Cardiovascular: Normal rate, regular rhythm, normal heart sounds and intact distal pulses.  Exam reveals no gallop and no friction rub.    No murmur heard.  Pulses:       Carotid pulses are 2+ on the right side, and 2+ on the left side.       Radial pulses are 2+ on the right side, and 2+ on the left side. "        Femoral pulses are 2+ on the right side, and 2+ on the left side.       Popliteal pulses are 2+ on the right side, and 2+ on the left side.        Dorsalis pedis pulses are 2+ on the right side, and 2+ on the left side.        Posterior tibial pulses are 2+ on the right side, and 2+ on the left side.   Pulmonary/Chest: Effort normal and breath sounds normal. No respiratory distress. She has no wheezes. She has no rales.   Abdominal: Soft. Bowel sounds are normal. She exhibits no distension. There is no hepatosplenomegaly. There is no tenderness. There is no rebound.   Musculoskeletal: Normal range of motion. She exhibits no edema or tenderness.   Neurological: She is alert and oriented to person, place, and time.   Skin: Skin is warm and dry. No rash noted. No erythema.   Psychiatric: She has a normal mood and affect. Her behavior is normal. Judgment and thought content normal.   Vitals reviewed.    Lab Review:     ECG 12 Lead  Date/Time: 5/16/2018 9:11 AM  Performed by: CYNTHIA NEWTON  Authorized by: CYNTHIA NEWTON   Comparison: compared with previous ECG   Similar to previous ECG  Rhythm: sinus rhythm  Conduction comments: Nonspecific ST T wave changes  Clinical impression: abnormal ECG          Assessment:       Diagnosis Plan   1. MVP (mitral valve prolapse)  ECG 12 Lead   2. Pure hypercholesterolemia  Adult Stress Echo W/ Cont or Stress Agent if Necessary Per Protocol   3. Benign essential hypertension  Adult Stress Echo W/ Cont or Stress Agent if Necessary Per Protocol   4. Abnormal EKG  Adult Stress Echo W/ Cont or Stress Agent if Necessary Per Protocol     Plan:       1. Questionable mitral valve prolapse. Will check an echocardiogram   2.  Hyperlipidemia. She will also increase her exercise and if LDL is still not significantly improved, may need to consider switch to Repatha.   3. Abnormal EKG. She has nonspecific ST-T wave changes. She has also had some exertional fatigue. Will check a stress  echocardiogram as this may represent an anginal equivalent with multiple risk factors for coronary artery disease.  4. Exertional dyspnea, as above. Will also check a TSH.  5. Hypertension. Blood pressure has been reasonably controlled. She was previously on lisinopril and was switched to chlorthalidone several years ago. It is uncertain why. She did not have a cough or an allergy. As there is significant left ventricular hypertrophy on the echocardiogram, would favor a switch to ACE inhibitor or ARB therapy.           Kelsey Nicholson   Home Medication Instructions [facility]:    Printed on:05/18/18 6160   Medication Information                      Ascorbic Acid (VITAMIN C) 1000 MG tablet  Take 1,000 mg by mouth daily.             B Complex-C-E-Zn (B COMPLEX-C-E-ZINC) tablet  Take 1 tablet by mouth Daily.             chlorthalidone (HYGROTON) 25 MG tablet  Take 1 tablet by mouth Daily.             Cholecalciferol 1000 UNITS capsule  Take  by mouth.             EPINEPHrine (EPIPEN) 0.3 MG/0.3ML solution auto-injector  0.3 mg once. Inject intramuscularly as directed             fluticasone (FLONASE) 50 MCG/ACT nasal spray  SHAKE WELL AND USE 2 SPRAYS IN EACH NOSTRIL DAILY             folic acid (FOLVITE) 400 MCG tablet  Take 400 mcg by mouth Daily.             gabapentin (NEURONTIN) 300 MG capsule  Take 1 capsule by mouth 3 (Three) Times a Day.             Komal, Zingiber officinalis, (KOMAL ROOT PO)  Take by mouth.             Multiple Vitamin (MULTIVITAMIN) tablet  Take 1 tablet by mouth daily.             pantoprazole (PROTONIX) 40 MG EC tablet  TAKE 1 TABLET BY MOUTH DAILY EVERY MORNING AND EVERY EVENING             potassium chloride (K-DUR,KLOR-CON) 20 MEQ CR tablet  Take 20 mEq by mouth Daily.             sucralfate (CARAFATE) 1 g tablet  Take 1 tablet by mouth 2 (Two) Times a Day Before Meals.             Zinc Sulfate 66 MG tablet  Take  by mouth.                 No orders of the defined types were placed in  this encounter.      Dictated utilizing Dragon dictation

## 2018-05-17 DIAGNOSIS — R07.2 PRECORDIAL PAIN: ICD-10-CM

## 2018-05-18 PROBLEM — R94.31 ABNORMAL EKG: Status: ACTIVE | Noted: 2018-05-18

## 2018-05-18 RX ORDER — PANTOPRAZOLE SODIUM 40 MG/1
TABLET, DELAYED RELEASE ORAL
Qty: 180 TABLET | Refills: 0 | Status: SHIPPED | OUTPATIENT
Start: 2018-05-18 | End: 2018-06-11 | Stop reason: SDUPTHER

## 2018-05-30 ENCOUNTER — HOSPITAL ENCOUNTER (OUTPATIENT)
Dept: CARDIOLOGY | Facility: HOSPITAL | Age: 63
Discharge: HOME | End: 2018-05-30
Attending: INTERNAL MEDICINE | Admitting: INTERNAL MEDICINE

## 2018-05-30 VITALS
SYSTOLIC BLOOD PRESSURE: 118 MMHG | DIASTOLIC BLOOD PRESSURE: 90 MMHG | HEIGHT: 65 IN | WEIGHT: 185 LBS | BODY MASS INDEX: 30.82 KG/M2 | HEART RATE: 71 BPM

## 2018-05-30 DIAGNOSIS — I10 BENIGN ESSENTIAL HYPERTENSION: ICD-10-CM

## 2018-05-30 DIAGNOSIS — E78.00 PURE HYPERCHOLESTEROLEMIA: ICD-10-CM

## 2018-05-30 DIAGNOSIS — R94.31 ABNORMAL EKG: ICD-10-CM

## 2018-05-30 PROCEDURE — 93325 DOPPLER ECHO COLOR FLOW MAPG: CPT | Performed by: INTERNAL MEDICINE

## 2018-05-30 PROCEDURE — 93320 DOPPLER ECHO COMPLETE: CPT

## 2018-05-30 PROCEDURE — 93350 STRESS TTE ONLY: CPT | Performed by: INTERNAL MEDICINE

## 2018-05-30 PROCEDURE — 93325 DOPPLER ECHO COLOR FLOW MAPG: CPT

## 2018-05-30 PROCEDURE — 93016 CV STRESS TEST SUPVJ ONLY: CPT | Performed by: INTERNAL MEDICINE

## 2018-05-30 PROCEDURE — 93017 CV STRESS TEST TRACING ONLY: CPT

## 2018-05-30 PROCEDURE — 93018 CV STRESS TEST I&R ONLY: CPT | Performed by: INTERNAL MEDICINE

## 2018-05-30 PROCEDURE — 93320 DOPPLER ECHO COMPLETE: CPT | Performed by: INTERNAL MEDICINE

## 2018-05-30 PROCEDURE — 93352 ADMIN ECG CONTRAST AGENT: CPT | Performed by: INTERNAL MEDICINE

## 2018-05-30 PROCEDURE — 93350 STRESS TTE ONLY: CPT

## 2018-05-30 PROCEDURE — 25010000002 PERFLUTREN (DEFINITY) 8.476 MG IN SODIUM CHLORIDE 0.9 % 10 ML INJECTION: Performed by: INTERNAL MEDICINE

## 2018-05-30 RX ADMIN — PERFLUTREN 1.5 ML: 6.52 INJECTION, SUSPENSION INTRAVENOUS at 11:04

## 2018-05-31 LAB
ASCENDING AORTA: 3.2 CM
BH CV ECHO MEAS - ACS: 1.8 CM
BH CV ECHO MEAS - AO MAX PG: 6 MMHG
BH CV ECHO MEAS - AO ROOT AREA (BSA CORRECTED): 1.7
BH CV ECHO MEAS - AO ROOT AREA: 8.1 CM^2
BH CV ECHO MEAS - AO ROOT DIAM: 3.2 CM
BH CV ECHO MEAS - AO V2 MAX: 122.2 CM/SEC
BH CV ECHO MEAS - BSA(HAYCOCK): 2 M^2
BH CV ECHO MEAS - BSA: 1.9 M^2
BH CV ECHO MEAS - BZI_BMI: 30.8 KILOGRAMS/M^2
BH CV ECHO MEAS - BZI_METRIC_HEIGHT: 165.1 CM
BH CV ECHO MEAS - BZI_METRIC_WEIGHT: 83.9 KG
BH CV ECHO MEAS - CONTRAST EF 4CH: 66.2 ML/M^2
BH CV ECHO MEAS - EDV(MOD-SP4): 136 ML
BH CV ECHO MEAS - EDV(TEICH): 99.7 ML
BH CV ECHO MEAS - EF(CUBED): 69.5 %
BH CV ECHO MEAS - EF(MOD-BP): 66 %
BH CV ECHO MEAS - EF(MOD-SP4): 66.2 %
BH CV ECHO MEAS - EF(TEICH): 61.2 %
BH CV ECHO MEAS - ESV(MOD-SP4): 46 ML
BH CV ECHO MEAS - ESV(TEICH): 38.7 ML
BH CV ECHO MEAS - FS: 32.7 %
BH CV ECHO MEAS - IVS/LVPW: 1
BH CV ECHO MEAS - IVSD: 1.3 CM
BH CV ECHO MEAS - LAT PEAK E' VEL: 5 CM/SEC
BH CV ECHO MEAS - LV DIASTOLIC VOL/BSA (35-75): 71.1 ML/M^2
BH CV ECHO MEAS - LV MASS(C)D: 223.3 GRAMS
BH CV ECHO MEAS - LV MASS(C)DI: 116.7 GRAMS/M^2
BH CV ECHO MEAS - LV SYSTOLIC VOL/BSA (12-30): 24 ML/M^2
BH CV ECHO MEAS - LVIDD: 4.6 CM
BH CV ECHO MEAS - LVIDS: 3.1 CM
BH CV ECHO MEAS - LVLD AP4: 7.9 CM
BH CV ECHO MEAS - LVLS AP4: 6.4 CM
BH CV ECHO MEAS - LVPWD: 1.3 CM
BH CV ECHO MEAS - MED PEAK E' VEL: 8 CM/SEC
BH CV ECHO MEAS - MV A DUR: 0.1 SEC
BH CV ECHO MEAS - MV A MAX VEL: 63.1 CM/SEC
BH CV ECHO MEAS - MV DEC SLOPE: 180.7 CM/SEC^2
BH CV ECHO MEAS - MV DEC TIME: 0.21 SEC
BH CV ECHO MEAS - MV E MAX VEL: 40.7 CM/SEC
BH CV ECHO MEAS - MV E/A: 0.65
BH CV ECHO MEAS - MV P1/2T MAX VEL: 40.7 CM/SEC
BH CV ECHO MEAS - MV P1/2T: 66.1 MSEC
BH CV ECHO MEAS - MVA P1/2T LCG: 5.4 CM^2
BH CV ECHO MEAS - MVA(P1/2T): 3.3 CM^2
BH CV ECHO MEAS - PULM A REVS DUR: 0.12 SEC
BH CV ECHO MEAS - PULM A REVS VEL: 32.4 CM/SEC
BH CV ECHO MEAS - PULM DIAS VEL: 36.7 CM/SEC
BH CV ECHO MEAS - PULM S/D: 0.95
BH CV ECHO MEAS - PULM SYS VEL: 34.8 CM/SEC
BH CV ECHO MEAS - SI(CUBED): 36.5 ML/M^2
BH CV ECHO MEAS - SI(MOD-SP4): 47 ML/M^2
BH CV ECHO MEAS - SI(TEICH): 31.9 ML/M^2
BH CV ECHO MEAS - SV(CUBED): 69.8 ML
BH CV ECHO MEAS - SV(MOD-SP4): 90 ML
BH CV ECHO MEAS - SV(TEICH): 61 ML
BH CV ECHO MEAS - TAPSE (>1.6): 2 CM2
BH CV ECHO MEASUREMENTS AVERAGE E/E' RATIO: 6.26
BH CV STRESS BP STAGE 1: NORMAL
BH CV STRESS BP STAGE 2: NORMAL
BH CV STRESS DURATION MIN STAGE 1: 3
BH CV STRESS DURATION MIN STAGE 2: 3
BH CV STRESS DURATION MIN STAGE 3: 1
BH CV STRESS DURATION SEC STAGE 1: 0
BH CV STRESS DURATION SEC STAGE 2: 0
BH CV STRESS DURATION SEC STAGE 3: 0
BH CV STRESS ECHO POST STRESS EJECTION FRACTION EF: 74 %
BH CV STRESS GRADE STAGE 1: 10
BH CV STRESS GRADE STAGE 2: 12
BH CV STRESS GRADE STAGE 3: 14
BH CV STRESS HR STAGE 1: 110
BH CV STRESS HR STAGE 2: 132
BH CV STRESS HR STAGE 3: 146
BH CV STRESS METS STAGE 1: 5
BH CV STRESS METS STAGE 2: 7.5
BH CV STRESS METS STAGE 3: 10
BH CV STRESS PROTOCOL 1: NORMAL
BH CV STRESS SPEED STAGE 1: 1.7
BH CV STRESS SPEED STAGE 2: 2.5
BH CV STRESS SPEED STAGE 3: 3.4
BH CV STRESS STAGE 1: 1
BH CV STRESS STAGE 2: 2
BH CV STRESS STAGE 3: 3
BH CV XLRA - RV BASE: 2 CM
BH CV XLRA - TDI S': 10 CM/SEC
LEFT ATRIUM VOLUME INDEX: 15 ML/M2
MAXIMAL PREDICTED HEART RATE: 159 BPM
PERCENT MAX PREDICTED HR: 91.82 %
SINUS: 2.8 CM
STJ: 3 CM
STRESS BASELINE BP: NORMAL MMHG
STRESS BASELINE HR: 71 BPM
STRESS O2 SAT REST: 94 %
STRESS PERCENT HR: 108 %
STRESS POST ESTIMATED WORKLOAD: 8 METS
STRESS POST EXERCISE DUR MIN: 7 MIN
STRESS POST EXERCISE DUR SEC: 0 SEC
STRESS POST PEAK BP: NORMAL MMHG
STRESS POST PEAK HR: 146 BPM
STRESS TARGET HR: 135 BPM

## 2018-06-01 ENCOUNTER — TELEPHONE (OUTPATIENT)
Dept: CARDIOLOGY | Facility: CLINIC | Age: 63
End: 2018-06-01

## 2018-06-01 RX ORDER — LISINOPRIL 10 MG/1
10 TABLET ORAL DAILY
Qty: 30 TABLET | Refills: 11 | Status: SHIPPED | OUTPATIENT
Start: 2018-06-01 | End: 2019-05-08 | Stop reason: SDUPTHER

## 2018-06-11 ENCOUNTER — OFFICE VISIT (OUTPATIENT)
Dept: GASTROENTEROLOGY | Facility: CLINIC | Age: 63
End: 2018-06-11

## 2018-06-11 VITALS
HEIGHT: 65 IN | WEIGHT: 188 LBS | BODY MASS INDEX: 31.32 KG/M2 | SYSTOLIC BLOOD PRESSURE: 126 MMHG | DIASTOLIC BLOOD PRESSURE: 88 MMHG | TEMPERATURE: 98.3 F

## 2018-06-11 DIAGNOSIS — K21.00 GASTROESOPHAGEAL REFLUX DISEASE WITH ESOPHAGITIS: ICD-10-CM

## 2018-06-11 DIAGNOSIS — K29.00 ACUTE EROSIVE GASTRITIS: Primary | ICD-10-CM

## 2018-06-11 DIAGNOSIS — D12.6 ADENOMATOUS POLYP OF COLON, UNSPECIFIED PART OF COLON: ICD-10-CM

## 2018-06-11 DIAGNOSIS — K57.90 DIVERTICULOSIS OF INTESTINE WITHOUT BLEEDING, UNSPECIFIED INTESTINAL TRACT LOCATION: ICD-10-CM

## 2018-06-11 PROCEDURE — 99214 OFFICE O/P EST MOD 30 MIN: CPT | Performed by: NURSE PRACTITIONER

## 2018-06-11 RX ORDER — SUCRALFATE 1 G/1
1 TABLET ORAL 4 TIMES DAILY
Qty: 120 TABLET | Refills: 2 | Status: SHIPPED | OUTPATIENT
Start: 2018-06-11 | End: 2018-06-11 | Stop reason: SDUPTHER

## 2018-06-11 RX ORDER — POTASSIUM CHLORIDE 1.5 G/1.77G
20 POWDER, FOR SOLUTION ORAL 2 TIMES DAILY
COMMUNITY
End: 2018-06-26

## 2018-06-11 NOTE — PROGRESS NOTES
Chief Complaint   Patient presents with   • Follow-up     [place]       Kelseyjolly Nicholson is a  63 y.o. female here for a follow up visit for GERD.     HPI  62 yo f presents today for follow up visit for GERD, dyspepsia, bloating and hx colon polyps. She is a patient of Dr. Quesada. She was last seen in office on 4/11/18. She was having worsening reflux symptoms and underwent EGD and Colonoscopy on 4/25/18. EGD showed acute erosive gastritis. Path was negative. Colonoscopy showed diverticulosis and colon polyp. Path + TA. She was started on carafate BID and continued on protonix 40 mg BID. She admits she has been on protonix a long time. She doesn't think the PPI is working anymore. She is still having abd pain with reflux symptoms. She has been doing the carafate BID. She has the pills and has not been dissolving them in water. She does have hx IBS and admits she takes the gabapentin for this. She reports her PCP will no longer prescribe this and wants her to get refill from Dr. Quesada. She denies any dysphagia, N&V, diarrhea, constipation, rectal bleeding or melena. She admits her appetite is good and her weight has been stable.     Past Medical History:   Diagnosis Date   • Abdominal pain    • Cough    • GERD (gastroesophageal reflux disease)    • Hyperlipidemia    • Hyperplastic colon polyp    • Hypertension    • Irritable bowel syndrome    • Mitral valve prolapse 2000   • Osteoarthritis    • Pain of left thumb        Past Surgical History:   Procedure Laterality Date   • BREAST BIOPSY     • CHOLECYSTECTOMY  12/2013   • COLONOSCOPY  10/16/2013    IH, Hyperplastic polyp    • COLONOSCOPY  04/25/2018    Diverticulosis in the sigmoid colon and in the descending colon, colon polyp   • ENDOSCOPY  10/16/2013    gastritis.     • HYSTERECTOMY     • OOPHORECTOMY     • ROTATOR CUFF REPAIR Left 11/2014   • UPPER GASTROINTESTINAL ENDOSCOPY  04/25/2018    Acute erosive gastritis   • URETHRAL SUSPENSION         Scheduled  Meds:    Continuous Infusions:  No current facility-administered medications for this visit.     PRN Meds:.    Allergies   Allergen Reactions   • Statins    • Sulfa Antibiotics    • Oxycodone-Acetaminophen Rash       Social History     Social History   • Marital status: Single     Spouse name: N/A   • Number of children: N/A   • Years of education: N/A     Occupational History   • Not on file.     Social History Main Topics   • Smoking status: Never Smoker   • Smokeless tobacco: Never Used   • Alcohol use No   • Drug use: Unknown   • Sexual activity: Defer     Other Topics Concern   • Not on file     Social History Narrative   • No narrative on file       Family History   Problem Relation Age of Onset   • Cancer Father    • Diabetes Father    • Hypertension Father    • Stroke Father    • Heart disease Father        Review of Systems   Constitutional: Negative for appetite change, chills, diaphoresis, fatigue, fever and unexpected weight change.   HENT: Negative for nosebleeds, postnasal drip, sore throat, trouble swallowing and voice change.    Respiratory: Negative for cough, choking, chest tightness, shortness of breath and wheezing.    Cardiovascular: Negative for chest pain.   Gastrointestinal: Positive for abdominal distention, abdominal pain and nausea. Negative for anal bleeding, blood in stool, constipation, diarrhea, rectal pain and vomiting.   Endocrine: Negative for polydipsia, polyphagia and polyuria.   Musculoskeletal: Negative for gait problem.   Skin: Negative for rash and wound.   Allergic/Immunologic: Negative for food allergies.   Neurological: Negative for dizziness, speech difficulty and light-headedness.   Psychiatric/Behavioral: Negative for confusion, self-injury, sleep disturbance and suicidal ideas.       Vitals:    06/11/18 1326   BP: 126/88   Temp: 98.3 °F (36.8 °C)       Physical Exam   Constitutional: She is oriented to person, place, and time. She appears well-developed and  well-nourished. She does not appear ill. No distress.   HENT:   Head: Normocephalic.   Eyes: Pupils are equal, round, and reactive to light.   Cardiovascular: Normal rate, regular rhythm and normal heart sounds.    Pulmonary/Chest: Effort normal and breath sounds normal.   Abdominal: Soft. Bowel sounds are normal. She exhibits distension. She exhibits no mass. There is no hepatosplenomegaly. There is tenderness in the epigastric area and left upper quadrant. There is no rebound and no guarding. No hernia.       Musculoskeletal: Normal range of motion.   Neurological: She is alert and oriented to person, place, and time.   Skin: Skin is warm and dry.   Psychiatric: She has a normal mood and affect. Her speech is normal and behavior is normal. Judgment normal.       No images are attached to the encounter.    Assessment & Plan    1. Acute erosive gastritis    2. Gastroesophageal reflux disease with esophagitis  - sucralfate (CARAFATE) 1 g tablet; Take 1 tablet by mouth 4 (Four) Times a Day.  Dispense: 120 tablet; Refill: 2    3. Diverticulosis of intestine without bleeding, unspecified intestinal tract location    4. Adenomatous polyp of colon, unspecified part of colon    I reviewed EGD and Colonoscopy results with patient. She is still having symptoms despite Carafate BID and Protonix 40 mg BID. I will change to Dexilant 60 mg daily and increase the Carafate QID. Patient to call office with update in 1-2 weeks. Follow up with Dr. Quesada in 3 months. Call office with any issues.

## 2018-06-12 ENCOUNTER — TELEPHONE (OUTPATIENT)
Dept: GASTROENTEROLOGY | Facility: CLINIC | Age: 63
End: 2018-06-12

## 2018-06-12 RX ORDER — SUCRALFATE 1 G/1
TABLET ORAL
Qty: 360 TABLET | Refills: 0 | Status: SHIPPED | OUTPATIENT
Start: 2018-06-12 | End: 2019-12-18

## 2018-06-12 NOTE — TELEPHONE ENCOUNTER
----- Message from KRISTAL Linn sent at 6/12/2018 10:21 AM EDT -----  Regarding: FW: med  Please call patient and let her know Dr. Quesada does not prescribe any controlled substances and unfortunately Gabapentin is now on that list. She will have to discuss with her PCP or be referred to pain management for it. She tells me she was taking it for IBS and her new PCP will not write for it.     Thanks.   ----- Message -----  From: Jose Enrique Quesada MD  Sent: 6/12/2018  10:07 AM  To: KRISTAL Linn  Subject: med                                              I don't prescribe any controlled substances, I haven't for years  Sorry  bd      ----- Message -----  From: KRISTAL Linn  Sent: 6/11/2018   4:26 PM  To: Jose Enrique Quesada MD    Seen patient today for f/u. She is requesting refill of gabapentin 300 mg po daily for IBS-D. Her PCP will no longer prescribe and told her to get it from you. I cannot prescribe it now shira it is controlled. I told patient I would send the request to you. Thanks.

## 2018-06-19 ENCOUNTER — TELEPHONE (OUTPATIENT)
Dept: GASTROENTEROLOGY | Facility: CLINIC | Age: 63
End: 2018-06-19

## 2018-06-19 ENCOUNTER — DOCUMENTATION (OUTPATIENT)
Dept: GASTROENTEROLOGY | Facility: CLINIC | Age: 63
End: 2018-06-19

## 2018-06-19 LAB
ALBUMIN SERPL-MCNC: 4.3 G/DL (ref 3.5–5.2)
ALBUMIN/GLOB SERPL: 1.5 G/DL
ALP SERPL-CCNC: 86 U/L (ref 39–117)
ALT SERPL-CCNC: 18 U/L (ref 1–33)
AST SERPL-CCNC: 20 U/L (ref 1–32)
BILIRUB SERPL-MCNC: 0.5 MG/DL (ref 0.1–1.2)
BUN SERPL-MCNC: 11 MG/DL (ref 8–23)
BUN/CREAT SERPL: 11.6 (ref 7–25)
CALCIUM SERPL-MCNC: 9.8 MG/DL (ref 8.6–10.5)
CHLORIDE SERPL-SCNC: 103 MMOL/L (ref 98–107)
CHOLEST SERPL-MCNC: 292 MG/DL (ref 0–200)
CO2 SERPL-SCNC: 31.1 MMOL/L (ref 22–29)
CREAT SERPL-MCNC: 0.95 MG/DL (ref 0.57–1)
GFR SERPLBLD CREATININE-BSD FMLA CKD-EPI: 60 ML/MIN/1.73
GFR SERPLBLD CREATININE-BSD FMLA CKD-EPI: 72 ML/MIN/1.73
GLOBULIN SER CALC-MCNC: 2.9 GM/DL
GLUCOSE SERPL-MCNC: 99 MG/DL (ref 65–99)
HDLC SERPL-MCNC: 48 MG/DL (ref 40–60)
LDLC SERPL CALC-MCNC: 212 MG/DL (ref 0–100)
LDLC/HDLC SERPL: 4.41 {RATIO}
POTASSIUM SERPL-SCNC: 3.7 MMOL/L (ref 3.5–5.2)
PROT SERPL-MCNC: 7.2 G/DL (ref 6–8.5)
SODIUM SERPL-SCNC: 147 MMOL/L (ref 136–145)
TRIGL SERPL-MCNC: 161 MG/DL (ref 0–150)
VLDLC SERPL CALC-MCNC: 32.2 MG/DL (ref 5–40)

## 2018-06-19 RX ORDER — DEXLANSOPRAZOLE 60 MG/1
60 CAPSULE, DELAYED RELEASE ORAL
Qty: 90 CAPSULE | Refills: 2 | Status: ON HOLD | OUTPATIENT
Start: 2018-06-19 | End: 2020-02-03 | Stop reason: SDUPTHER

## 2018-06-20 ENCOUNTER — TELEPHONE (OUTPATIENT)
Dept: CARDIOLOGY | Facility: CLINIC | Age: 63
End: 2018-06-20

## 2018-06-20 NOTE — TELEPHONE ENCOUNTER
[phone]    Patient called to let you know she got her labs drawn yesterday at Dr. Dimas's office.  They are in EPIC and she wanted you to review them.    Thanks,  Tawny

## 2018-06-21 NOTE — TELEPHONE ENCOUNTER
Let patient know kidney tests and potassium are normal, sodium slightly elevated and to increase water intake slightly.  Cholesterol is atrocious.  Please see if Dr. Dimas has addressed with an alternative statin one that she might not have tried previously and if not may need to consider Repatha. holden

## 2018-06-26 ENCOUNTER — OFFICE VISIT (OUTPATIENT)
Dept: INTERNAL MEDICINE | Facility: CLINIC | Age: 63
End: 2018-06-26

## 2018-06-26 ENCOUNTER — TELEPHONE (OUTPATIENT)
Dept: CARDIOLOGY | Facility: CLINIC | Age: 63
End: 2018-06-26

## 2018-06-26 VITALS
TEMPERATURE: 98.3 F | SYSTOLIC BLOOD PRESSURE: 132 MMHG | HEART RATE: 87 BPM | WEIGHT: 194 LBS | DIASTOLIC BLOOD PRESSURE: 70 MMHG | OXYGEN SATURATION: 98 % | BODY MASS INDEX: 32.32 KG/M2 | HEIGHT: 65 IN

## 2018-06-26 DIAGNOSIS — Z00.00 PHYSICAL EXAM, ANNUAL: Primary | ICD-10-CM

## 2018-06-26 PROCEDURE — 99396 PREV VISIT EST AGE 40-64: CPT | Performed by: FAMILY MEDICINE

## 2018-06-26 RX ORDER — DEXLANSOPRAZOLE 60 MG/1
CAPSULE, DELAYED RELEASE ORAL
COMMUNITY
Start: 2018-06-12 | End: 2018-06-26 | Stop reason: SDUPTHER

## 2018-06-26 NOTE — PROGRESS NOTES
Subjective   Kelsey Nicholson is a 63 y.o. female.   Chief Complaint   Patient presents with   • Annual Exam       History of Present Illness     #1 CPE- pt is here for complete physical exam without GYN evaluation.  She has no complaints.  Medical history-she was diagnosed with stomach ulcer in 4/2018.  She is on Carafate and Dexilon.  She was found to have polyps which were precancerous.  Colonoscopy was in April 2018. Next is recommended 5 years later.  She is scheduled with her GI for follow-up on stomach ulcers.  She saw Dr. Blancas and HCTZ was stopped together with potassium.  She started lisinopril at 10 mg a day.  Kidney tests and electrolytes are normal.  We prescribed pravastatin at last office visit, but she did not tolerate it.  She was not sure what reaction she had, but she thinks that she had shortness of breath.  No other changes in medications.  She is not allergic to any new medications.  She does not smoke cigarettes, she doesn't drink alcohol.  She doesn't use drugs.  She exercises 3-4 times a week for one hour.  She is a bike for 25-30 minutes and does weights and stretching for 30 minutes.  She improved her diet.  She decreased amount of fruits and vegetables.  She has dental appointments every 6 months.  Last eye exam was more than a year ago.  She had mammogram, Pap smear, pelvic exam, breast exam and rectal exam but by her GYN Dr. Morales in August 2017.  She had tetanus vaccine in 2013, Pneumovax November 2016.      The following portions of the patient's history were reviewed and updated as appropriate: allergies, current medications, past family history, past medical history, past social history, past surgical history and problem list.    Review of Systems   Constitutional: Negative.    HENT: Negative.    Respiratory: Negative.    Cardiovascular: Negative.    Genitourinary: Negative.    Neurological: Negative.          Objective   Wt Readings from Last 3 Encounters:   06/26/18 88 kg (194 lb)    06/11/18 85.3 kg (188 lb)   05/30/18 83.9 kg (185 lb)      Vitals:    06/26/18 1436   BP: 132/70   Pulse: 87   Temp: 98.3 °F (36.8 °C)   SpO2: 98%     Temp Readings from Last 3 Encounters:   06/26/18 98.3 °F (36.8 °C)   06/11/18 98.3 °F (36.8 °C) (Oral)   04/11/18 98.5 °F (36.9 °C)     BP Readings from Last 3 Encounters:   06/26/18 132/70   06/11/18 126/88   05/30/18 118/90     Pulse Readings from Last 3 Encounters:   06/26/18 87   05/30/18 71   05/16/18 63       Physical Exam   Constitutional: She is oriented to person, place, and time. She appears well-developed and well-nourished. No distress.   HENT:   Head: Normocephalic and atraumatic. Hair is normal.   Right Ear: Hearing, tympanic membrane, external ear and ear canal normal. No drainage. No decreased hearing is noted.   Left Ear: Hearing, tympanic membrane, external ear and ear canal normal. No decreased hearing is noted.   Nose: No nasal deformity.   Mouth/Throat: Oropharynx is clear and moist.   Eyes: Conjunctivae, EOM and lids are normal. Pupils are equal, round, and reactive to light. Right eye exhibits no discharge. Left eye exhibits no discharge.   Neck: Normal range of motion. Neck supple. No JVD present. No tracheal deviation present. No thyromegaly present.   Cardiovascular: Normal rate, regular rhythm, normal heart sounds, intact distal pulses and normal pulses.  Exam reveals no gallop and no friction rub.    No murmur heard.  Pulmonary/Chest: Effort normal and breath sounds normal. No respiratory distress. She has no wheezes. She has no rales. She exhibits no tenderness.   Abdominal: Soft. She exhibits no distension and no mass. There is no tenderness. There is no rebound and no guarding. No hernia.   Musculoskeletal: Normal range of motion. She exhibits no edema, tenderness or deformity.   Lymphadenopathy:     She has no cervical adenopathy.   Neurological: She is alert and oriented to person, place, and time. She has normal reflexes. She  displays normal reflexes. No cranial nerve deficit. She exhibits normal muscle tone. Coordination normal.   Skin: Skin is warm and dry. No rash noted. She is not diaphoretic. No erythema.   Psychiatric: She has a normal mood and affect. Her behavior is normal. Judgment and thought content normal.   Vitals reviewed.      Assessment/Plan   Kelsey was seen today for annual exam.    Diagnoses and all orders for this visit:    Physical exam, annual        #1 CPE- is up-to-date with cancer screening.  She will increase exercise to at least 5 days a week.  She will continue to work on her diet.  She is advised to schedule office visit with ophthalmologist.  She is advised to get shingrix .

## 2018-06-27 RX ORDER — HYDROCHLOROTHIAZIDE 12.5 MG/1
12.5 CAPSULE, GELATIN COATED ORAL DAILY
Qty: 30 CAPSULE | Refills: 0 | Status: SHIPPED | OUTPATIENT
Start: 2018-06-27 | End: 2018-07-20 | Stop reason: SDUPTHER

## 2018-06-27 RX ORDER — HYDROCHLOROTHIAZIDE 12.5 MG/1
12.5 CAPSULE, GELATIN COATED ORAL DAILY
Qty: 90 CAPSULE | Refills: 0 | OUTPATIENT
Start: 2018-06-27

## 2018-06-28 DIAGNOSIS — I10 HYPERTENSION, UNSPECIFIED TYPE: ICD-10-CM

## 2018-06-28 DIAGNOSIS — R60.9 EDEMA, UNSPECIFIED TYPE: Primary | ICD-10-CM

## 2018-07-10 ENCOUNTER — LAB (OUTPATIENT)
Dept: LAB | Facility: HOSPITAL | Age: 63
End: 2018-07-10

## 2018-07-10 ENCOUNTER — TELEPHONE (OUTPATIENT)
Dept: GASTROENTEROLOGY | Facility: CLINIC | Age: 63
End: 2018-07-10

## 2018-07-10 ENCOUNTER — TELEPHONE (OUTPATIENT)
Dept: CARDIOLOGY | Facility: CLINIC | Age: 63
End: 2018-07-10

## 2018-07-10 DIAGNOSIS — I10 HYPERTENSION, UNSPECIFIED TYPE: ICD-10-CM

## 2018-07-10 DIAGNOSIS — R60.9 EDEMA, UNSPECIFIED TYPE: ICD-10-CM

## 2018-07-10 LAB
ANION GAP SERPL CALCULATED.3IONS-SCNC: 12 MMOL/L
BUN BLD-MCNC: 11 MG/DL (ref 8–23)
BUN/CREAT SERPL: 12.6 (ref 7–25)
CALCIUM SPEC-SCNC: 9.4 MG/DL (ref 8.6–10.5)
CHLORIDE SERPL-SCNC: 103 MMOL/L (ref 98–107)
CO2 SERPL-SCNC: 30 MMOL/L (ref 22–29)
CREAT BLD-MCNC: 0.87 MG/DL (ref 0.57–1)
GFR SERPL CREATININE-BSD FRML MDRD: 80 ML/MIN/1.73
GLUCOSE BLD-MCNC: 116 MG/DL (ref 65–99)
POTASSIUM BLD-SCNC: 3.3 MMOL/L (ref 3.5–5.2)
SODIUM BLD-SCNC: 145 MMOL/L (ref 136–145)

## 2018-07-10 PROCEDURE — 80048 BASIC METABOLIC PNL TOTAL CA: CPT

## 2018-07-10 PROCEDURE — 36415 COLL VENOUS BLD VENIPUNCTURE: CPT

## 2018-07-10 NOTE — TELEPHONE ENCOUNTER
----- Message from Shabana Agee sent at 7/10/2018 11:02 AM EDT -----  Regarding: DEXILANT   Contact: [phone]  PT CALLED ASKING FOR MORE SAMPLE OF MED

## 2018-07-10 NOTE — TELEPHONE ENCOUNTER
See letter under media. Dexilant PA has been denied.   Insurance prefers: omeprazole, lansoprazole, rabeprazole and esomeprazole.     Called pt and advised that the Dexilant was denied by her insurance company and they have a list of 4 medications that they want her to try before they will cover the medication. Pt states she does not want to try another medication when she knows the Dexilant works. Advised that she has an active script for the med at her pharmacy. Advised it is not us that want to change the medication, it is the decision of her insurance company. Advised that she can still get the Dexilant, it just will not be covered by insurance. Warned her that the medication is quite expensive but we can offer her a coupon card if she'd like. She wants to know if she can get more samples of the Dexilant to last until she decides what she wants to do. Advised will leave the samples and a coupon card at the  for her. Pt verb understanding.

## 2018-07-11 RX ORDER — POTASSIUM CHLORIDE 20 MEQ/1
20 TABLET, EXTENDED RELEASE ORAL DAILY
Qty: 90 TABLET | Refills: 1 | Status: SHIPPED | OUTPATIENT
Start: 2018-07-11 | End: 2019-01-25 | Stop reason: SDUPTHER

## 2018-07-11 NOTE — TELEPHONE ENCOUNTER
Let her know that glucose was high and potassium slightly low, both of which could be from hydrochlorothiazide.  However start potassium chloride 20 mEq a day and see Dr. Dimas regarding elevated glucose. holden.

## 2018-07-13 NOTE — TELEPHONE ENCOUNTER
Med list says that there is potassium ordered for her at 20 meq a day.  Please see if she is taking this and of the labs were done on this potassium dose. Prior  message indicates that she was not taking any potassium.    If she is taking potassium at 20 mEq a day, increases to 20 mg twice a day and recheck a BMP in 2 weeks  If she is not taking any potassium have her start 20 mEq a day and recheck a BMP in one week. holden

## 2018-07-14 NOTE — TELEPHONE ENCOUNTER
Correct, have her start the potassium chloride 20 mg once a day and recheck BMP in one week. holden

## 2018-07-18 ENCOUNTER — TELEPHONE (OUTPATIENT)
Dept: CARDIOLOGY | Facility: CLINIC | Age: 63
End: 2018-07-18

## 2018-07-18 DIAGNOSIS — E87.6 HYPOKALEMIA: Primary | ICD-10-CM

## 2018-07-18 NOTE — TELEPHONE ENCOUNTER
Patient left a message stating you wanted her to recheck labs tomorrow and she was making sure the order was in. She will be checking at [facility]. I do not see the lab orders.  Will you please place?   We need to let her know once placed.  BMP from encounter 06/26/ [phone]

## 2018-07-19 ENCOUNTER — TELEPHONE (OUTPATIENT)
Dept: GASTROENTEROLOGY | Facility: CLINIC | Age: 63
End: 2018-07-19

## 2018-07-19 ENCOUNTER — APPOINTMENT (OUTPATIENT)
Dept: LAB | Facility: HOSPITAL | Age: 63
End: 2018-07-19
Attending: INTERNAL MEDICINE

## 2018-07-19 LAB
ANION GAP SERPL CALCULATED.3IONS-SCNC: 11.9 MMOL/L
BUN BLD-MCNC: 10 MG/DL (ref 8–23)
BUN/CREAT SERPL: 10.8 (ref 7–25)
CALCIUM SPEC-SCNC: 9.6 MG/DL (ref 8.6–10.5)
CHLORIDE SERPL-SCNC: 103 MMOL/L (ref 98–107)
CO2 SERPL-SCNC: 28.1 MMOL/L (ref 22–29)
CREAT BLD-MCNC: 0.93 MG/DL (ref 0.57–1)
GFR SERPL CREATININE-BSD FRML MDRD: 74 ML/MIN/1.73
GLUCOSE BLD-MCNC: 102 MG/DL (ref 65–99)
POTASSIUM BLD-SCNC: 3.4 MMOL/L (ref 3.5–5.2)
SODIUM BLD-SCNC: 143 MMOL/L (ref 136–145)

## 2018-07-19 PROCEDURE — 80048 BASIC METABOLIC PNL TOTAL CA: CPT | Performed by: INTERNAL MEDICINE

## 2018-07-19 PROCEDURE — 36415 COLL VENOUS BLD VENIPUNCTURE: CPT | Performed by: INTERNAL MEDICINE

## 2018-07-19 NOTE — TELEPHONE ENCOUNTER
Patient called, she states she is applying for Dexilant financial assistance through [email or web address]. She states she will drop the paperwork off at our office this afternoon for Dr. Quesada to complete.

## 2018-07-19 NOTE — TELEPHONE ENCOUNTER
----- Message from Francisco Maier sent at 7/19/2018  7:58 AM EDT -----  Regarding: pharm called  Contact: [phone]  Adenike calling from [facility] about pt medication dexlansoprazole (DEXILANT) 60 MG capsule. It needs a p/a but pt wants to change it to an alterative medication

## 2018-07-20 ENCOUNTER — TELEPHONE (OUTPATIENT)
Dept: CARDIOLOGY | Facility: CLINIC | Age: 63
End: 2018-07-20

## 2018-07-20 RX ORDER — HYDROCHLOROTHIAZIDE 12.5 MG/1
12.5 CAPSULE, GELATIN COATED ORAL DAILY
Qty: 30 CAPSULE | Refills: 2 | Status: SHIPPED | OUTPATIENT
Start: 2018-07-20 | End: 2018-10-18 | Stop reason: SDUPTHER

## 2018-07-20 NOTE — TELEPHONE ENCOUNTER
Info to pt.  She has been taking K+.  She will call PCP next week.      Ophelia- Fax labs to PCP Monday.

## 2018-07-20 NOTE — TELEPHONE ENCOUNTER
Let her know potassium borderline low and increase dietary intake of potassium. Recheck labs with pcp in 2 weeks. (BMP). holden

## 2018-07-23 ENCOUNTER — TELEPHONE (OUTPATIENT)
Dept: GASTROENTEROLOGY | Facility: CLINIC | Age: 63
End: 2018-07-23

## 2018-07-23 NOTE — TELEPHONE ENCOUNTER
Called [facility] back and spoke with Kenyatta. She states pt called her near the end of last week and told her that the Dexilant had been denied and she was wanting to look into something cheaper covered by insurance. Advised the last we had heard form the pt, she was going to look into pt financial assistance with [employer]. She states she is not sure what happened with that, but pt was wanting to change to a covered alternative for Dexilant since it was denied. Advised that per the denial letter, insurance prefers: omeprazole, lansoprazole, rabeprazole and esomeprazole. Advised I only have pantoprazole listed and a med previously tried. Pharmacist states that is the only med they have dispensed. Gave verbal for new script: lansoprazole 30mg 1 PO daily #30 with 5 refills

## 2018-07-23 NOTE — TELEPHONE ENCOUNTER
----- Message from Shabana Agee sent at 7/23/2018  8:15 AM EDT -----  Regarding: RX  Contact: [phone]  PHARM IS ASKING FOR A CALLED BACK DUE TO CHANGES IN MED

## 2018-08-02 DIAGNOSIS — E87.6 HYPOKALEMIA: Primary | ICD-10-CM

## 2018-08-03 DIAGNOSIS — E87.6 HYPOKALEMIA: Primary | ICD-10-CM

## 2018-08-03 LAB
BUN SERPL-MCNC: 9 MG/DL (ref 8–23)
BUN/CREAT SERPL: 9.2 (ref 7–25)
CALCIUM SERPL-MCNC: 9.7 MG/DL (ref 8.6–10.5)
CHLORIDE SERPL-SCNC: 102 MMOL/L (ref 98–107)
CO2 SERPL-SCNC: 26.7 MMOL/L (ref 22–29)
CREAT SERPL-MCNC: 0.98 MG/DL (ref 0.57–1)
GLUCOSE SERPL-MCNC: 100 MG/DL (ref 65–99)
POTASSIUM SERPL-SCNC: 4 MMOL/L (ref 3.5–5.2)
SODIUM SERPL-SCNC: 144 MMOL/L (ref 136–145)

## 2018-08-08 RX ORDER — SUCRALFATE 1 G/1
1 TABLET ORAL
Qty: 360 TABLET | Refills: 0 | Status: SHIPPED | OUTPATIENT
Start: 2018-08-08 | End: 2018-10-10 | Stop reason: SDUPTHER

## 2018-10-10 ENCOUNTER — TELEPHONE (OUTPATIENT)
Dept: GASTROENTEROLOGY | Facility: CLINIC | Age: 63
End: 2018-10-10

## 2018-10-10 RX ORDER — SUCRALFATE 1 G/1
1 TABLET ORAL
Qty: 120 TABLET | Refills: 1 | Status: SHIPPED | OUTPATIENT
Start: 2018-10-10 | End: 2018-10-10 | Stop reason: SDUPTHER

## 2018-10-10 NOTE — TELEPHONE ENCOUNTER
----- Message from Yaima Raymond sent at 10/10/2018  2:19 PM EDT -----  Regarding: MEDICATION  Contact: [phone]  PATIENT HAD AN APPOINTMENT WITH [facility] TODAY. SHE SAID SHE NEEDS A REFILL ON HER CARAFATE. SHE CAN BE REACHED AT THE NUMBER ABOVE.

## 2018-10-16 RX ORDER — SUCRALFATE 1 G/1
TABLET ORAL
Qty: 360 TABLET | Refills: 0 | Status: SHIPPED | OUTPATIENT
Start: 2018-10-16 | End: 2018-10-24

## 2018-10-18 RX ORDER — HYDROCHLOROTHIAZIDE 12.5 MG/1
12.5 CAPSULE, GELATIN COATED ORAL DAILY
Qty: 30 CAPSULE | Refills: 0 | Status: SHIPPED | OUTPATIENT
Start: 2018-10-18 | End: 2018-11-19 | Stop reason: SDUPTHER

## 2018-10-24 ENCOUNTER — OFFICE VISIT (OUTPATIENT)
Dept: INTERNAL MEDICINE | Facility: CLINIC | Age: 63
End: 2018-10-24

## 2018-10-24 VITALS
TEMPERATURE: 98 F | WEIGHT: 187 LBS | HEIGHT: 65 IN | SYSTOLIC BLOOD PRESSURE: 134 MMHG | DIASTOLIC BLOOD PRESSURE: 80 MMHG | OXYGEN SATURATION: 98 % | BODY MASS INDEX: 31.16 KG/M2 | HEART RATE: 64 BPM

## 2018-10-24 DIAGNOSIS — R73.9 HYPERGLYCEMIA: ICD-10-CM

## 2018-10-24 DIAGNOSIS — E78.2 MIXED HYPERLIPIDEMIA: Primary | ICD-10-CM

## 2018-10-24 PROCEDURE — 99213 OFFICE O/P EST LOW 20 MIN: CPT | Performed by: FAMILY MEDICINE

## 2018-10-24 NOTE — PROGRESS NOTES
"Subjective   Kelsey Nicholson is a 63 y.o. female.   Chief Complaint   Patient presents with   • Hyperglycemia   • Hyperlipidemia       History of Present Illness     #1 HLP- patient was treated in the past with Crestor.  She had muscle cramps and remember that liver enzymes were elevated, she does not remember how high they were.  We tried pravastatin at 10 mg a day, but patient did not tolerated it.  She is not sure what the reaction was, but she thinks she had rash, not sure where it was.  She thinks that she had shortness of breath and/or wheezing.  She changed her diet significantly from last office visit.  She became \"almost a vegetarian\".      #2 hyperglycemia-patient had blood work done recently and had blood sugar elevation at 3 different occasions.  It was at 116, 102 and 100.  Patient is not sure if she was fasting or not prior to blood work.  BMI is at 31.1.  Family history is positive for diabetes in her father.    The following portions of the patient's history were reviewed and updated as appropriate: allergies, current medications, past family history, past medical history, past social history and problem list.    Review of Systems   Constitutional: Negative.    Respiratory: Negative.    Cardiovascular: Negative.          Objective   Wt Readings from Last 3 Encounters:   10/24/18 84.8 kg (187 lb)   06/26/18 88 kg (194 lb)   06/11/18 85.3 kg (188 lb)      Vitals:    10/24/18 1134   BP: 134/80   Pulse: 64   Temp: 98 °F (36.7 °C)   SpO2: 98%     Temp Readings from Last 3 Encounters:   10/24/18 98 °F (36.7 °C)   06/26/18 98.3 °F (36.8 °C)   06/11/18 98.3 °F (36.8 °C) (Oral)     BP Readings from Last 3 Encounters:   10/24/18 134/80   06/26/18 132/70   06/11/18 126/88     Pulse Readings from Last 3 Encounters:   10/24/18 64   06/26/18 87   05/30/18 71       Physical Exam   Constitutional: She is oriented to person, place, and time. She appears well-developed and well-nourished.   HENT:   Head: " Normocephalic and atraumatic.   Neck: Neck supple. Carotid bruit is not present.   Cardiovascular: Normal rate, regular rhythm and normal heart sounds.    Pulmonary/Chest: Effort normal and breath sounds normal.   Neurological: She is alert and oriented to person, place, and time.   Skin: Skin is warm, dry and intact.   Psychiatric: She has a normal mood and affect. Her behavior is normal.       Assessment/Plan   Kelsey was seen today for hyperglycemia and hyperlipidemia.    Diagnoses and all orders for this visit:    Mixed hyperlipidemia  -     Hemoglobin A1c  -     Lipid Panel With LDL / HDL Ratio    Hyperglycemia  -     Hemoglobin A1c  -     Lipid Panel With LDL / HDL Ratio        #1 HLP- patient did not tolerate Crestor, she did not tolerate pravastatin.  I think there is an element of anxiety in it, but either way we cannot use statins at this point.  We'll check cholesterol to see how dietary changes affected levels of cholesterol.      #2 hyperglycemia-we are checking A1c.

## 2018-10-26 LAB
CHOLEST SERPL-MCNC: 259 MG/DL (ref 0–200)
HBA1C MFR BLD: 5.89 % (ref 4.8–5.6)
HDLC SERPL-MCNC: 46 MG/DL (ref 40–60)
LDLC SERPL CALC-MCNC: 183 MG/DL (ref 0–100)
LDLC/HDLC SERPL: 3.99 {RATIO}
TRIGL SERPL-MCNC: 148 MG/DL (ref 0–150)
VLDLC SERPL CALC-MCNC: 29.6 MG/DL (ref 5–40)

## 2018-11-07 ENCOUNTER — TELEPHONE (OUTPATIENT)
Dept: GASTROENTEROLOGY | Facility: CLINIC | Age: 63
End: 2018-11-07

## 2018-11-07 ENCOUNTER — OFFICE VISIT (OUTPATIENT)
Dept: CARDIOLOGY | Facility: CLINIC | Age: 63
End: 2018-11-07

## 2018-11-07 VITALS
WEIGHT: 187 LBS | BODY MASS INDEX: 31.16 KG/M2 | SYSTOLIC BLOOD PRESSURE: 112 MMHG | DIASTOLIC BLOOD PRESSURE: 72 MMHG | HEIGHT: 65 IN | HEART RATE: 80 BPM

## 2018-11-07 DIAGNOSIS — R40.0 DAYTIME SOMNOLENCE: ICD-10-CM

## 2018-11-07 DIAGNOSIS — R06.83 SNORING: Primary | ICD-10-CM

## 2018-11-07 DIAGNOSIS — I10 BENIGN ESSENTIAL HYPERTENSION: ICD-10-CM

## 2018-11-07 DIAGNOSIS — R94.31 ABNORMAL EKG: ICD-10-CM

## 2018-11-07 PROCEDURE — 99214 OFFICE O/P EST MOD 30 MIN: CPT | Performed by: INTERNAL MEDICINE

## 2018-11-07 PROCEDURE — 93000 ELECTROCARDIOGRAM COMPLETE: CPT | Performed by: INTERNAL MEDICINE

## 2018-11-07 NOTE — TELEPHONE ENCOUNTER
----- Message from Francisco Maier sent at 11/7/2018  8:21 AM EST -----  Regarding: Medication   Contact: [phone]  Pt is calling asking if she can get some samples of dexilant, she is nely to see  on nov 20th ?

## 2018-11-07 NOTE — PROGRESS NOTES
Date of Office Visit: 2018  Encounter Provider: Kaylah Blancas MD  Place of Service: [facility] CARDIOLOGY  Patient Name: Kelsey Nicholson  :1955    Chief complaint  evaluation of mitral valve disease    History of Present Illness  Patient is a 63-year-old female with history of hyperlipidemia, hypertension, GE reflux.  She has a distant history of mitral valve prolapse with trivial mitral regurgitation noted previously by JAMES.  She had an echocardiogram May 2018 that showed normal systolic function grade 1 diastolic dysfunction structurally normal mitral valve with trivial mitral regurgitation.  She had a stress echocardiogram that was negative for ischemia.    Since last visit she denies any chest pain, shortness of breath, palpitations, syncope near syncope.  She is riding the bicycle for 25-30 minutes 3 times a week.  She snores at night has noted apneic episodes and feels daytime somnolence.  She would like to be tested for sleep apnea.    Past Medical History:   Diagnosis Date   • Abdominal pain    • Cough    • GERD (gastroesophageal reflux disease)    • Hyperlipidemia    • Hyperplastic colon polyp    • Hypertension    • Irritable bowel syndrome    • Mitral valve prolapse    • Osteoarthritis    • Pain of left thumb    • Pure hypercholesterolemia      Past Surgical History:   Procedure Laterality Date   • BREAST BIOPSY     • CHOLECYSTECTOMY  2013   • COLONOSCOPY  10/16/2013    IH, Hyperplastic polyp    • COLONOSCOPY  2018    Diverticulosis in the sigmoid colon and in the descending colon, colon polyp   • ENDOSCOPY  10/16/2013    gastritis.     • HYSTERECTOMY     • OOPHORECTOMY     • ROTATOR CUFF REPAIR Left 2014   • UPPER GASTROINTESTINAL ENDOSCOPY  2018    Acute erosive gastritis   • URETHRAL SUSPENSION       Outpatient Medications Prior to Visit   Medication Sig Dispense Refill   • Ascorbic Acid (VITAMIN C) 1000 MG tablet Take 1,000 mg by mouth  daily.     • B Complex-C-E-Zn (B COMPLEX-C-E-ZINC) tablet Take 1 tablet by mouth Daily.     • Cholecalciferol 1000 UNITS capsule Take  by mouth.     • dexlansoprazole (DEXILANT) 60 MG capsule Take 1 capsule by mouth Every Morning Before Breakfast. 90 capsule 2   • EPINEPHrine (EPIPEN) 0.3 MG/0.3ML solution auto-injector 0.3 mg once. Inject intramuscularly as directed     • fluticasone (FLONASE) 50 MCG/ACT nasal spray SHAKE WELL AND USE 2 SPRAYS IN EACH NOSTRIL DAILY 16 g 11   • folic acid (FOLVITE) 400 MCG tablet Take 400 mcg by mouth Daily.     • gabapentin (NEURONTIN) 300 MG capsule Take 1 capsule by mouth 3 (Three) Times a Day. (Patient taking differently: Take 300 mg by mouth Daily.) 270 capsule 1   • Komal, Zingiber officinalis, (KOMAL ROOT PO) Take by mouth.     • hydrochlorothiazide (MICROZIDE) 12.5 MG capsule TAKE 1 CAPSULE BY MOUTH DAILY 30 capsule 0   • lisinopril (PRINIVIL,ZESTRIL) 10 MG tablet Take 1 tablet by mouth Daily. 30 tablet 11   • Multiple Vitamin (MULTIVITAMIN) tablet Take 1 tablet by mouth daily.     • potassium chloride (K-DUR,KLOR-CON) 20 MEQ CR tablet Take 1 tablet by mouth Daily. 90 tablet 1   • sucralfate (CARAFATE) 1 g tablet TAKE 1 TABLET BY MOUTH FOUR TIMES DAILY 360 tablet 0   • Zinc Sulfate 66 MG tablet Take  by mouth.       No facility-administered medications prior to visit.        Allergies as of 11/07/2018 - Reviewed 10/24/2018   Allergen Reaction Noted   • Statins  10/20/2015   • Sulfa antibiotics  06/23/2014   • Oxycodone-acetaminophen Rash 11/28/2016     Social History     Socioeconomic History   • Marital status: Single     Spouse name: Not on file   • Number of children: Not on file   • Years of education: Not on file   • Highest education level: Not on file   Social Needs   • Financial resource strain: Not on file   • Food insecurity - worry: Not on file   • Food insecurity - inability: Not on file   • Transportation needs - medical: Not on file   • Transportation needs -  "non-medical: Not on file   Occupational History   • Not on file   Tobacco Use   • Smoking status: Never Smoker   • Smokeless tobacco: Never Used   Substance and Sexual Activity   • Alcohol use: No   • Drug use: Defer   • Sexual activity: Defer   Other Topics Concern   • Not on file   Social History Narrative   • Not on file     Family History   Problem Relation Age of Onset   • Cancer Father    • Diabetes Father    • Hypertension Father    • Stroke Father    • Heart disease Father      Review of Systems   Constitution: Negative for fever, malaise/fatigue, weight gain and weight loss.   HENT: Negative for ear pain, hearing loss, nosebleeds and sore throat.    Eyes: Negative for double vision, pain, vision loss in left eye and vision loss in right eye.   Cardiovascular:        See history of present illness.   Respiratory: Negative for cough, shortness of breath, sleep disturbances due to breathing, snoring and wheezing.    Endocrine: Negative for cold intolerance, heat intolerance and polyuria.   Skin: Negative for itching, poor wound healing and rash.   Musculoskeletal: Positive for myalgias. Negative for joint pain and joint swelling.   Gastrointestinal: Negative for abdominal pain, diarrhea, hematochezia, nausea and vomiting.   Genitourinary: Negative for hematuria and hesitancy.   Neurological: Negative for numbness, paresthesias and seizures.   Psychiatric/Behavioral: Negative for depression. The patient is nervous/anxious.         Objective:     Vitals:    11/07/18 1411   BP: 112/72   Pulse: 80   Weight: 84.8 kg (187 lb)   Height: 165.1 cm (65\")     Body mass index is 31.12 kg/m².    Physical Exam   Constitutional: She is oriented to person, place, and time. She appears well-developed and well-nourished.   Obese   HENT:   Head: Normocephalic.   Nose: Nose normal.   Mouth/Throat: Oropharynx is clear and moist.   Eyes: Conjunctivae and EOM are normal. Pupils are equal, round, and reactive to light. Right eye " exhibits no discharge. No scleral icterus.   Neck: Normal range of motion. Neck supple. No JVD present. No thyromegaly present.   Cardiovascular: Normal rate, regular rhythm, normal heart sounds and intact distal pulses. Exam reveals no gallop and no friction rub.   No murmur heard.  Pulses:       Carotid pulses are 2+ on the right side, and 2+ on the left side.       Radial pulses are 2+ on the right side, and 2+ on the left side.        Femoral pulses are 2+ on the right side, and 2+ on the left side.       Popliteal pulses are 2+ on the right side, and 2+ on the left side.        Dorsalis pedis pulses are 2+ on the right side, and 2+ on the left side.        Posterior tibial pulses are 2+ on the right side, and 2+ on the left side.   Pulmonary/Chest: Effort normal and breath sounds normal. No respiratory distress. She has no wheezes. She has no rales.   Abdominal: Soft. Bowel sounds are normal. She exhibits no distension. There is no hepatosplenomegaly. There is no tenderness. There is no rebound.   Musculoskeletal: Normal range of motion. She exhibits no edema or tenderness.   Neurological: She is alert and oriented to person, place, and time.   Skin: Skin is warm and dry. No rash noted. No erythema.   Psychiatric: She has a normal mood and affect. Her behavior is normal. Judgment and thought content normal.   Vitals reviewed.    Lab Review:     ECG 12 Lead  Date/Time: 11/7/2018 2:21 PM  Performed by: Kaylah Blancas MD  Authorized by: Kaylah Blancas MD   Comparison: compared with previous ECG   Similar to previous ECG  Rhythm: sinus rhythm  Clinical impression: normal ECG          Assessment:       Diagnosis Plan   1. Snoring  ECG 12 Lead    Ambulatory Referral to Sleep Medicine   2. Daytime somnolence  ECG 12 Lead    Ambulatory Referral to Sleep Medicine   3. Benign essential hypertension     4. Abnormal EKG       Plan:       1.  Hypertension.  Blood pressures controlled  2.  Structurally normal cardiac valves  3.   Probable sleep apnea.  We'll refer to sleep medicine  4.  Dyslipidemia.  Patient plans to pursue more aggressive low cholesterol diet as her exercise frequency.  His lipids are not controlled  3-6 months, would consider treatment with Repatha       Your medication list           Accurate as of 11/7/18 11:59 PM. If you have any questions, ask your nurse or doctor.               CHANGE how you take these medications      Instructions Last Dose Given Next Dose Due   gabapentin 300 MG capsule  Commonly known as:  NEURONTIN  What changed:  when to take this      Take 1 capsule by mouth 3 (Three) Times a Day.          CONTINUE taking these medications      Instructions Last Dose Given Next Dose Due   ascorbic acid 1000 MG tablet  Commonly known as:  VITAMIN C      Take 1,000 mg by mouth daily.       b complex-C-E-zinc tablet      Take 1 tablet by mouth Daily.       Cholecalciferol 1000 units capsule      Take  by mouth.       dexlansoprazole 60 MG capsule  Commonly known as:  DEXILANT      Take 1 capsule by mouth Every Morning Before Breakfast.       EPIPEN 0.3 MG/0.3ML solution auto-injector injection  Generic drug:  EPINEPHrine      0.3 mg once. Inject intramuscularly as directed       fluticasone 50 MCG/ACT nasal spray  Commonly known as:  FLONASE      SHAKE WELL AND USE 2 SPRAYS IN EACH NOSTRIL DAILY       folic acid 400 MCG tablet  Commonly known as:  FOLVITE      Take 400 mcg by mouth Daily.       JENNY ROOT PO      Take by mouth.       hydrochlorothiazide 12.5 MG capsule  Commonly known as:  MICROZIDE      TAKE 1 CAPSULE BY MOUTH DAILY       lisinopril 10 MG tablet  Commonly known as:  PRINIVIL,ZESTRIL      Take 1 tablet by mouth Daily.       multivitamin tablet      Take 1 tablet by mouth daily.       potassium chloride 20 MEQ CR tablet  Commonly known as:  K-DUR,KLOR-CON      Take 1 tablet by mouth Daily.       sucralfate 1 g tablet  Commonly known as:  CARAFATE      TAKE 1 TABLET BY MOUTH FOUR TIMES DAILY        Zinc Sulfate 66 MG tablet      Take  by mouth.              Patient is no longer taking -.  I corrected the med list to reflect this.  I did not stop these medications.    Dictated utilizing Dragon dictation

## 2018-11-07 NOTE — TELEPHONE ENCOUNTER
Called pt back. Pt states her PAP for Dexilant has run out and she needs to renew the paperwork, but she is down to 1 pill left. Advised that the paperwor says she is eligible to receive the medication until 12/31/18. Asked if she has reached out to the assistance program to see about another shipment of medication as she should be eligible for another two months? Pt states no, but she will call them. Advised will leave a few boxes at the  for her in the meantime. Pt verb understanding.   3 boxes of Dexilant left at  for pt.

## 2018-11-15 PROBLEM — R06.83 SNORING: Status: ACTIVE | Noted: 2018-11-15

## 2018-11-15 PROBLEM — R40.0 DAYTIME SOMNOLENCE: Status: ACTIVE | Noted: 2018-11-15

## 2018-11-20 ENCOUNTER — OFFICE VISIT (OUTPATIENT)
Dept: GASTROENTEROLOGY | Facility: CLINIC | Age: 63
End: 2018-11-20

## 2018-11-20 VITALS
BODY MASS INDEX: 31.09 KG/M2 | HEIGHT: 65 IN | SYSTOLIC BLOOD PRESSURE: 124 MMHG | WEIGHT: 186.6 LBS | TEMPERATURE: 98.4 F | DIASTOLIC BLOOD PRESSURE: 72 MMHG

## 2018-11-20 DIAGNOSIS — K63.5 HYPERPLASTIC COLONIC POLYP, UNSPECIFIED PART OF COLON: Primary | ICD-10-CM

## 2018-11-20 DIAGNOSIS — R68.81 EARLY SATIETY: ICD-10-CM

## 2018-11-20 DIAGNOSIS — K27.9 PUD (PEPTIC ULCER DISEASE): ICD-10-CM

## 2018-11-20 DIAGNOSIS — R10.10 PAIN OF UPPER ABDOMEN: ICD-10-CM

## 2018-11-20 DIAGNOSIS — R14.0 ABDOMINAL BLOATING: ICD-10-CM

## 2018-11-20 DIAGNOSIS — D12.6 ADENOMATOUS POLYP OF COLON, UNSPECIFIED PART OF COLON: ICD-10-CM

## 2018-11-20 PROCEDURE — 99213 OFFICE O/P EST LOW 20 MIN: CPT | Performed by: INTERNAL MEDICINE

## 2018-11-20 RX ORDER — HYDROCHLOROTHIAZIDE 12.5 MG/1
12.5 CAPSULE, GELATIN COATED ORAL DAILY
Qty: 30 CAPSULE | Refills: 5 | Status: SHIPPED | OUTPATIENT
Start: 2018-11-20 | End: 2019-05-20 | Stop reason: SDUPTHER

## 2018-11-20 NOTE — PROGRESS NOTES
Chief Complaint   Patient presents with   • Follow-up     ULCERS        Kelsey Nicholson is a  63 y.o. female here for a follow up visit for peptic ulcer disease, left upper quadrant pain    She continues with left upper quadrant pain, worse after meals.  It does wake her up in the middle the night.  Her gallbladder is out, ulcers were found 6 months ago.  She is on dexilant once a day  She is H. pylori negative  Colon polyps removed 6 months ago        Past Medical History:   Diagnosis Date   • Abdominal pain    • Cough    • GERD (gastroesophageal reflux disease)    • Hyperlipidemia    • Hyperplastic colon polyp    • Hypertension    • Irritable bowel syndrome    • Mitral valve prolapse 2000   • Osteoarthritis    • Pain of left thumb    • Pure hypercholesterolemia        Past Surgical History:   Procedure Laterality Date   • BREAST BIOPSY     • CHOLECYSTECTOMY  12/2013   • COLONOSCOPY  10/16/2013    IH, Hyperplastic polyp    • COLONOSCOPY  04/25/2018    Diverticulosis in the sigmoid colon and in the descending colon, colon polyp   • ENDOSCOPY  10/16/2013    gastritis.     • HYSTERECTOMY     • OOPHORECTOMY     • ROTATOR CUFF REPAIR Left 11/2014   • UPPER GASTROINTESTINAL ENDOSCOPY  04/25/2018    Acute erosive gastritis   • URETHRAL SUSPENSION         Scheduled Meds:    Continuous Infusions:  No current facility-administered medications for this visit.     PRN Meds:.    Allergies   Allergen Reactions   • Statins    • Sulfa Antibiotics    • Oxycodone-Acetaminophen Rash       Social History     Socioeconomic History   • Marital status: Single     Spouse name: Not on file   • Number of children: Not on file   • Years of education: Not on file   • Highest education level: Not on file   Social Needs   • Financial resource strain: Not on file   • Food insecurity - worry: Not on file   • Food insecurity - inability: Not on file   • Transportation needs - medical: Not on file   • Transportation needs - non-medical: Not on  file   Occupational History   • Not on file   Tobacco Use   • Smoking status: Never Smoker   • Smokeless tobacco: Never Used   Substance and Sexual Activity   • Alcohol use: No   • Drug use: Defer   • Sexual activity: Defer   Other Topics Concern   • Not on file   Social History Narrative   • Not on file       Family History   Problem Relation Age of Onset   • Cancer Father    • Diabetes Father    • Hypertension Father    • Stroke Father    • Heart disease Father        Review of Systems   Gastrointestinal: Positive for abdominal distention and abdominal pain.   All other systems reviewed and are negative.      Vitals:    11/20/18 0845   BP: 124/72   Temp: 98.4 °F (36.9 °C)       Physical Exam   Constitutional: She is oriented to person, place, and time. She appears well-developed and well-nourished.   HENT:   Head: Normocephalic and atraumatic.   Eyes: Pupils are equal, round, and reactive to light.   Cardiovascular: Normal rate, regular rhythm and normal heart sounds.   Pulmonary/Chest: Effort normal and breath sounds normal.   Abdominal: Soft. Bowel sounds are normal. She exhibits no shifting dullness, no distension, no pulsatile liver, no fluid wave, no abdominal bruit, no ascites, no pulsatile midline mass and no mass. There is no hepatosplenomegaly. There is tenderness. There is no rigidity and no guarding. No hernia.   Musculoskeletal: Normal range of motion.   Neurological: She is alert and oriented to person, place, and time.   Skin: Skin is warm and dry.   Psychiatric: She has a normal mood and affect. Her behavior is normal. Thought content normal.   Nursing note and vitals reviewed.      No images are attached to the encounter.  Problem list    Left upper quadrant pain  GERD  Peptic ulcer disease  Colon polyps    Assessment/Plan    EGD to check ulcer healing  If EGD is normal we will move to an MRCP to look for retained stones or sludge, ampullary stenosis  We are going to double dexilant I have given  her samples, 60 mg by mouth twice a day

## 2018-11-30 ENCOUNTER — TELEPHONE (OUTPATIENT)
Dept: GASTROENTEROLOGY | Facility: CLINIC | Age: 63
End: 2018-11-30

## 2018-11-30 RX ORDER — SUCRALFATE 1 G/1
TABLET ORAL
Qty: 360 TABLET | Refills: 1 | Status: SHIPPED | OUTPATIENT
Start: 2018-11-30 | End: 2019-05-08 | Stop reason: SDUPTHER

## 2018-11-30 NOTE — TELEPHONE ENCOUNTER
----- Message from Sindhu Padilla sent at 11/30/2018 11:02 AM EST -----  Regarding: PAIN ON LEFT SIDE  Contact: [phone]  PT IS JEFFRY FOR PROCEDURE ON WED. SHE'S HAVING A LOT OF PAIN WHEN BREATHING AND BLOATING.

## 2018-11-30 NOTE — TELEPHONE ENCOUNTER
"Per Dr Quesada: \"Call in irritable bowel medication, Levsin, 0.125 mg sublingual 1-2 tablets every 4-6 hours as needed for abdominal pain, #60, 2 refills\"    Called pt and advised of the note from Dr Quesada. She verb understanding.   "

## 2018-11-30 NOTE — TELEPHONE ENCOUNTER
Called pt back. Pt states she is having bloating and abdominal pain on the left side. She saw Dr Quesada an 11/20 and he increased her Dexilant to twice daily so she has been doing that and he ordered an EGD for her hx of ulcers. She is scheduled for that on Wed next week. She also has IBS and she is not sure what is causing the pain issue. She previously had a script for gabapentin but was told she would need to see a pain MD for that so she weaned herself off the med. She took one last night and it helped a little, but pain resumed this AM. She stopped Carafate a couple weeks ago but restarted it yesterday at twice daily. Advised med should be taken four times daily. She asks if med could be called in as she only has a few more pills. Advised will send Carafate to her pharmacy now and will forward update to MD for further instructions. Pt verb understanding.   Med e-scribed.

## 2018-12-05 ENCOUNTER — OUTSIDE FACILITY SERVICE (OUTPATIENT)
Dept: GASTROENTEROLOGY | Facility: CLINIC | Age: 63
End: 2018-12-05

## 2018-12-05 PROCEDURE — 43239 EGD BIOPSY SINGLE/MULTIPLE: CPT | Performed by: INTERNAL MEDICINE

## 2018-12-17 ENCOUNTER — TELEPHONE (OUTPATIENT)
Dept: GASTROENTEROLOGY | Facility: CLINIC | Age: 63
End: 2018-12-17

## 2018-12-17 DIAGNOSIS — R10.9 ABDOMINAL PAIN, UNSPECIFIED ABDOMINAL LOCATION: Primary | ICD-10-CM

## 2018-12-17 DIAGNOSIS — K31.9 MUCOSAL ABNORMALITY OF DUODENUM: ICD-10-CM

## 2018-12-17 NOTE — TELEPHONE ENCOUNTER
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

## 2018-12-17 NOTE — TELEPHONE ENCOUNTER
Called pt and advised of the note from Dr Quesada. Advised can set up an appt for labs to be drawn here in the office and will place the order for the MRI to be done. Advised someone should call her form the scheduling department to arrange the test within a day or two. Pt verb understanding.   Lab appt made for 12/21 at 1 PM.   Order for MRCP placed.

## 2018-12-21 ENCOUNTER — OFFICE VISIT (OUTPATIENT)
Dept: SLEEP MEDICINE | Facility: HOSPITAL | Age: 63
End: 2018-12-21
Attending: INTERNAL MEDICINE

## 2018-12-21 VITALS
HEIGHT: 65 IN | WEIGHT: 186 LBS | DIASTOLIC BLOOD PRESSURE: 63 MMHG | HEART RATE: 76 BPM | BODY MASS INDEX: 30.99 KG/M2 | SYSTOLIC BLOOD PRESSURE: 165 MMHG

## 2018-12-21 DIAGNOSIS — E66.9 OBESITY (BMI 30-39.9): ICD-10-CM

## 2018-12-21 DIAGNOSIS — G47.33 OSA (OBSTRUCTIVE SLEEP APNEA): ICD-10-CM

## 2018-12-21 DIAGNOSIS — G47.10 HYPERSOMNOLENCE: ICD-10-CM

## 2018-12-21 DIAGNOSIS — R06.83 SNORING: ICD-10-CM

## 2018-12-21 DIAGNOSIS — G47.30 SLEEP APNEA, UNSPECIFIED TYPE: Primary | ICD-10-CM

## 2018-12-21 PROCEDURE — G0463 HOSPITAL OUTPT CLINIC VISIT: HCPCS

## 2018-12-21 NOTE — PROGRESS NOTES
[facility]  [address]  [address] [place] [facility] [address]  [phone]    Referring Physician: Dr. Blancas  PCP: Elizabeth Dimas MD    Reason for consult:  Sleep disorders of Snoring    Kelsey Nicholson is a 63 y.o.female was seen in the [facility] today. She has loud snoring and witnessed apneas. Also advised to see up by cardiologist. She sleeps from 9p to 7a. She wakes up tired and unrefreshed. Reports daytime sleepiness. She has GERD. She tends to grind her teeth at night. She denies drowsiness with driving.  [place] Sleepiness Score: 11. Caffeine intake 0 per day. Alcohol intake 0 per week.    Kelsey Nicholson  has a past medical history of Abdominal pain, Cough, GERD (gastroesophageal reflux disease), Hyperlipidemia, Hyperplastic colon polyp, Hypertension, Irritable bowel syndrome, Mitral valve prolapse (2000), Osteoarthritis, Pain of left thumb, and Pure hypercholesterolemia.     Current Medications:    Current Outpatient Medications:   •  Ascorbic Acid (VITAMIN C) 1000 MG tablet, Take 1,000 mg by mouth daily., Disp: , Rfl:   •  B Complex-C-E-Zn (B COMPLEX-C-E-ZINC) tablet, Take 1 tablet by mouth Daily., Disp: , Rfl:   •  Cholecalciferol 1000 UNITS capsule, Take  by mouth., Disp: , Rfl:   •  dexlansoprazole (DEXILANT) 60 MG capsule, Take 1 capsule by mouth Every Morning Before Breakfast., Disp: 90 capsule, Rfl: 2  •  EPINEPHrine (EPIPEN) 0.3 MG/0.3ML solution auto-injector, 0.3 mg once. Inject intramuscularly as directed, Disp: , Rfl:   •  fluticasone (FLONASE) 50 MCG/ACT nasal spray, SHAKE WELL AND USE 2 SPRAYS IN EACH NOSTRIL DAILY, Disp: 16 g, Rfl: 11  •  folic acid (FOLVITE) 400 MCG tablet, Take 400 mcg by mouth Daily., Disp: , Rfl:   •  gabapentin (NEURONTIN) 300 MG capsule, Take 1 capsule by mouth 3 (Three) Times a Day. (Patient taking differently: Take 300 mg by mouth Daily.), Disp: 270 capsule, Rfl: 1  •  Jenny, Zingiber officinalis, (JENNY ROOT PO),  "Take by mouth., Disp: , Rfl:   •  hydrochlorothiazide (MICROZIDE) 12.5 MG capsule, TAKE 1 CAPSULE BY MOUTH DAILY, Disp: 30 capsule, Rfl: 5  •  hyoscyamine (LEVSIN) 0.125 MG SL tablet, Dissolve 1-2 tablets under the tongue every 4 hours as needed for abdominal pain, Disp: 60 tablet, Rfl: 2  •  lisinopril (PRINIVIL,ZESTRIL) 10 MG tablet, Take 1 tablet by mouth Daily., Disp: 30 tablet, Rfl: 11  •  Multiple Vitamin (MULTIVITAMIN) tablet, Take 1 tablet by mouth daily., Disp: , Rfl:   •  potassium chloride (K-DUR,KLOR-CON) 20 MEQ CR tablet, Take 1 tablet by mouth Daily., Disp: 90 tablet, Rfl: 1  •  sucralfate (CARAFATE) 1 g tablet, TAKE 1 TABLET BY MOUTH FOUR TIMES DAILY, Disp: 360 tablet, Rfl: 0  •  sucralfate (CARAFATE) 1 g tablet, Dissolve 1 tablet in 1 tablespoon warm water until it is a slurry and then drink. Do this QID, before meals and at bedtime., Disp: 360 tablet, Rfl: 1  •  Zinc Sulfate 66 MG tablet, Take  by mouth., Disp: , Rfl:    also listed in Sleep Questionnaire.    FH: family history includes Cancer in her father; Diabetes in her father; Heart disease in her father; Hypertension in her father; Stroke in her father.  SH:  reports that  has never smoked. she has never used smokeless tobacco. Drug use questions deferred to the physician. She reports that she does not drink alcohol.    Pertinent Positive Review of Systems of frequent urination, ear pain, nasal congestion, arthritis, anxiety, frequent heartburn  Rest of Review of Systems was negative as recorded in Sleep Questionnaire.        Vital Signs: /63   Pulse 76   Ht 165.1 cm (65\")   Wt 84.4 kg (186 lb)   BMI 30.95 kg/m²     Body mass index is 30.95 kg/m².       Tongue: large      Dentition: good       Pharynx: Posterior pharyngeal pillars are narrow   Mallampatti: I (soft palate, uvula, fauces, and tonsillar pillars visible)        General: Alert. Cooperative. Well developed. No acute distress.             Head:  Normocephalic. Symmetrical. " Atraumatic.              Eyes: Sclera clear. No icterus. PERRLA. Normal EOM.             Ears: No deformities. Normal hearing.             Nose: No septal deviation. No drainage.          Throat: No oral lesions. No thrush. Moist mucous membranes.    Chest Wall:  Normal shape. Symmetric expansion with respiration. No tenderness.             Neck:  Trachea midline.           Lungs:  Clear to auscultation bilaterally. No wheezes. No rhonchi. No rales. Respirations regular, even and unlabored.            Heart:  Regular rhythm and normal rate. Normal S1 and S2. No murmur.     Abdomen:  Soft, non-tender and non-distended. Normal bowel sounds. No masses.  Extremities:  Moves all extremities well. No edema.           Pulses: Pulses palpable and equal bilaterally.               Skin: Dry. Intact. No bleeding. No rash.           Neuro: Moves all 4 extremities and cranial nerves grossly intact.  Psychiatric: Normal mood and affect.    Impression:  1. Sleep apnea, unspecified type    2. Hypersomnolence    3. Obesity (BMI 30-39.9)    4. Snoring    5. DERRICK (obstructive sleep apnea)        Plan:  Kelsey needs further evaluation for obstructive sleep apnea and thereafter likely treatment with a CPAP device.  I will schedule her for a HST and thereafter see her back in the office.  If moderate to severe sleep apnea.  She requires treatment with CPAP.  I doubt that a oral appliance will work for her.  Weight loss would obviously be of benefit.    This patient has typical features of obstructive sleep apnea with hypersomnolence snoring and apneas along with elevated BMI and classic oropharyngeal structure.  Likelihood of obstructive sleep apnea is high and a polysomnogram study will therefore be requested.      Possible diagnosis and pathophysiology of obstructive sleep apnea was discussed with the patient.  Health risks of untreated obstructive sleep apnea including cardiovascular risks were discussed.  Patient was cautioned about  activities requiring full concentration especially driving at night or for longer distances, and until hypersomnolence is corrected.    Results of sleep testing will be reviewed to see if patient is a candidate for CPAP machine.  Alternatives to therapy include oral mandibular advancing device (OMAD) were discussed. However OMAD is usually only beneficial in mild obstructive sleep apnea.  The benefit of weight loss in reducing severity of obstructive sleep apnea was discussed.  Patient would benefit from adhering to a strict diet to achieve ideal BMI.     Patient will follow up in this clinic after testing.    Thank you for allowing me to participate in your patient's care.    Hang Mejia MD    Part of this note may be an electronic transcription/translation of spoken language to printed text using the Dragon Dictation System.

## 2018-12-24 LAB
ENDOMYSIUM IGA SER QL: NEGATIVE
GLIADIN PEPTIDE IGA SER-ACNC: 3 UNITS (ref 0–19)
GLIADIN PEPTIDE IGG SER-ACNC: 2 UNITS (ref 0–19)
IGA SERPL-MCNC: 212 MG/DL (ref 87–352)
TTG IGA SER-ACNC: <2 U/ML (ref 0–3)
TTG IGG SER-ACNC: <2 U/ML (ref 0–5)

## 2018-12-31 ENCOUNTER — HOSPITAL ENCOUNTER (OUTPATIENT)
Dept: MRI IMAGING | Facility: HOSPITAL | Age: 63
Discharge: HOME | End: 2018-12-31
Attending: INTERNAL MEDICINE | Admitting: INTERNAL MEDICINE

## 2018-12-31 DIAGNOSIS — R10.9 ABDOMINAL PAIN, UNSPECIFIED ABDOMINAL LOCATION: ICD-10-CM

## 2018-12-31 PROCEDURE — A9577 INJ MULTIHANCE: HCPCS | Performed by: INTERNAL MEDICINE

## 2018-12-31 PROCEDURE — 0 GADOBENATE DIMEGLUMINE 529 MG/ML SOLUTION: Performed by: INTERNAL MEDICINE

## 2018-12-31 PROCEDURE — 74183 MRI ABD W/O CNTR FLWD CNTR: CPT

## 2018-12-31 PROCEDURE — 82565 ASSAY OF CREATININE: CPT

## 2018-12-31 RX ADMIN — GADOBENATE DIMEGLUMINE 17 ML: 529 INJECTION, SOLUTION INTRAVENOUS at 08:39

## 2019-01-02 LAB — CREAT BLDA-MCNC: 0.9 MG/DL (ref 0.6–1.3)

## 2019-01-04 ENCOUNTER — TELEPHONE (OUTPATIENT)
Dept: GASTROENTEROLOGY | Facility: CLINIC | Age: 64
End: 2019-01-04

## 2019-01-04 NOTE — PROGRESS NOTES
MRCPnormal, tiny hepatic cysts are benign, common bile duct size at 6 mm is normal  Celiac profile was negative she does not have gluten intolerance  Continue twice a day PPI for gastritis  Continue FD guard are as needed  Office visit nurse practitioner next available

## 2019-01-04 NOTE — TELEPHONE ENCOUNTER
----- Message from Jose Enrique Quesada MD sent at 1/4/2019 11:42 AM EST -----  MRCPnormal, tiny hepatic cysts are benign, common bile duct size at 6 mm is normal  Celiac profile was negative she does not have gluten intolerance  Continue twice a day PPI for gastritis  Continue FD guard are as needed  Office visit nurse practitioner next available

## 2019-01-04 NOTE — TELEPHONE ENCOUNTER
Patient called, no answer, voicemail identified patient, left message. Advised as per Dr. Quesada's note. Advised to call back to schedule a f/u appointment with the NP and for questions and or concerns.

## 2019-01-25 RX ORDER — POTASSIUM CHLORIDE 20 MEQ/1
20 TABLET, EXTENDED RELEASE ORAL DAILY
Qty: 90 TABLET | Refills: 1 | Status: SHIPPED | OUTPATIENT
Start: 2019-01-25 | End: 2019-05-08 | Stop reason: SDUPTHER

## 2019-02-07 ENCOUNTER — TELEPHONE (OUTPATIENT)
Dept: GASTROENTEROLOGY | Facility: CLINIC | Age: 64
End: 2019-02-07

## 2019-02-07 NOTE — TELEPHONE ENCOUNTER
----- Message from Teresa Lee sent at 2/7/2019 10:17 AM EST -----  Regarding: stomach issues   Contact: [phone]  Pt is having stomach issues and would like a call back, can you please call her back, thank you

## 2019-02-14 NOTE — TELEPHONE ENCOUNTER
Per last set of result notes from Dr uQesada, MRCP was overall normal and celiac labs were negative.   Dr Quesada recommended pt to continue PPI and FDgard PRN. He also wanted pt to follow-up with NP at her next available appt.    Called pt... No answer; left a message that I was following up with her about a phone call we received from her regarding some continued stomach issues. Advised that previously, Dr Quesada had signed off on an MRI and some blood work that were both normal Advised MD had recommended that if her symptoms continued, to have her follow-up in the office with one of his nurse practitioners. Advised that if she is still having issues, she can call back to make that appt.

## 2019-02-21 ENCOUNTER — TREATMENT (OUTPATIENT)
Dept: PHYSICAL THERAPY | Facility: CLINIC | Age: 64
End: 2019-02-21

## 2019-02-21 DIAGNOSIS — R26.89 IMPAIRED GAIT AND MOBILITY: ICD-10-CM

## 2019-02-21 DIAGNOSIS — M25.60 LIMITED JOINT RANGE OF MOTION (ROM): ICD-10-CM

## 2019-02-21 DIAGNOSIS — M25.571 ACUTE RIGHT ANKLE PAIN: Primary | ICD-10-CM

## 2019-02-21 PROCEDURE — 97161 PT EVAL LOW COMPLEX 20 MIN: CPT | Performed by: PHYSICAL THERAPIST

## 2019-02-21 PROCEDURE — 97110 THERAPEUTIC EXERCISES: CPT | Performed by: PHYSICAL THERAPIST

## 2019-02-21 PROCEDURE — 97140 MANUAL THERAPY 1/> REGIONS: CPT | Performed by: PHYSICAL THERAPIST

## 2019-02-21 PROCEDURE — 97014 ELECTRIC STIMULATION THERAPY: CPT | Performed by: PHYSICAL THERAPIST

## 2019-02-21 NOTE — PROGRESS NOTES
Physical Therapy Initial Evaluation and Plan of Care        Patient: Kelsey Nicholson   : 1955  Diagnosis/ICD-10 Code:  Acute right ankle pain [M25.571]  Referring practitioner: Anoop Trinh MD  Date of Initial Visit: 2019  Today's Date: 2019  Patient seen for 1 sessions           Subjective Questionnaire: LEFS: 33/80      Subjective Evaluation    History of Present Illness  Date of onset: 2018  Date of surgery: 2018  Mechanism of injury: Patient reports fell getting off a ladder and fractured right ankle, states she also injured her left ankle as well but the treatment thus far has focused on the right ankle.    PMH: Cough; GERD (gastroesophageal reflux disease); Irritable bowel syndrome; Pain of left thumb; Abdominal pain; Hyperplastic colon polyp; Hypertension; Mitral valve prolapse; Hyperlipidemia; Pure hypercholesterolemia, arthritis, left RCR, left frozen shoulder s/p manipulation, gall bladder removed, hysterectomy, ovarian cysts removed, ganglion cyst remor    Subjective comment: Patient reports right ankle pain.  Patient Occupation: [employer]   Precautions and Work Restrictions: Off work currently, has 1 flight of steps to enter/exit/Quality of life: fair    Pain  Current pain ratin  At worst pain ratin  Location: Right ankle   Quality: sharp, needle-like, throbbing and dull ache  Relieving factors: rest and ice  Aggravating factors: movement, sleeping, standing and ambulation    Social Support  Lives in: one-story house  Lives with: alone    Hand dominance: left    Diagnostic Tests  Abnormal x-ray: right ankle-post-op: good alignment and healing appropriately.    Patient Goals  Patient goals for therapy: decreased pain, decreased edema, increased motion, increased strength, independence with ADLs/IADLs and return to work             Objective       Observations     Right Ankle/Foot   Positive for adhesive scar, edema and incision. Negative for  drainage.     Tenderness     Additional Tenderness Details  Generalized TTP throughout right foot/ankle and distal leg.  Hypersensitive to light touch.    Active Range of Motion     Right Ankle/Foot   Plantar flexion: 20 degrees   Inversion: 0 degrees   Eversion: 0 degrees     Additional Active Range of Motion Details  Right ankle DF-12 degree Lag    Strength/Myotome Testing     Additional Strength Details  Deferred due to post-op status, high pain level and markedly limited ROM.    Tests     Additional Tests Details  Deferred due to post op status.    Ambulation   Weight-Bearing Status   Weight-Bearing Status (Right): weight-bearing as tolerated    Assistive device used: two-wheeled walker    Additional Weight-Bearing Status Details  Air cast walking boot    Observational Gait   Gait: antalgic and asymmetric   Decreased walking speed.          Assessment & Plan     Assessment  Impairments: abnormal gait, abnormal or restricted ROM, activity intolerance, impaired physical strength, pain with function and weight-bearing intolerance  Assessment details: Kelsey Nicholson is a pleasant 63 y.o. female that presents with signs and symptoms consistent with the above diagnosis. Pt would benefit from skilled PT services in order to address listed impairments and increase tolerance to normal daily activities including ADLs, IADLs, work and recreational activities.  Prognosis: good  Prognosis details: Patient demonstrates good rehab potential as evidenced by high motivation to participate with PT POC and to return to PLOF/ADLs/IADLs/Work tasks.  Functional Limitations: walking, uncomfortable because of pain and standing  Goals  Plan Goals: Short Term Goals(4 wks):  1.  Patient will have increased ankle DF to 0 degrees and PF to 30 degrees.  2.  Patient will have increased ankle Inversion/Eversion to 10 degrees.  3.  Patient will have decreased pain to 0/10 to allow for increased comfort with functional activities and allow  for improved restorative sleep.  4.  Patient will have increased LE muscle flexibility to WNLs.  5.  Patient will have ankle and foot joint segmental mobility WNLs.    Long Term Goals (8 wks):  1.  Patient will have increased ankle strength to 5/5.  2.  Patient will have improved LEFS score of 60/80 or higher.  3.  Patient will be independent in performance of HEP for carryover upon discharge from skilled PT services.  4.  Patient will have normalized gait with least restrictive AD.  5.  Patient will report return to performance of ADLs/IADLs/Work/Leisure activities with minimal to no symptom reproduction/pain limitations.      Plan  Therapy options: will be seen for skilled physical therapy services  Planned modality interventions: high voltage pulsed current (pain management), ultrasound, cryotherapy and thermotherapy (hydrocollator packs)  Planned therapy interventions: manual therapy, soft tissue mobilization, strengthening, stretching, joint mobilization, flexibility, functional ROM exercises, gait training, home exercise program and balance/weight-bearing training  Frequency: 2x week  Duration in visits: 20  Treatment plan discussed with: patient  Plan details: Pt was educated on the importance of their HEP and their current need for continued skilled physical therapy. Patients goals and potential limitations were discussed and pt is in agreement with current plan of care and treatment emphasis.          Manual Therapy:    15     mins  63404;  Therapeutic Exercise:    10     mins  72308;     Neuromuscular Tiki:        mins  57504;    Therapeutic Activity:          mins  78804;     Gait Training:           mins  87684;     Ultrasound:          mins  97726;    Electrical Stimulation:   15      mins  11535 ( );  Dry Needling          mins self-pay    Timed Treatment:   25   mins   Total Treatment:     60   mins    PT SIGNATURE: Kenyatta Green PT   DATE TREATMENT INITIATED: 2/21/2019    Initial  Certification  Certification Period: 5/22/2019  I certify that the therapy services are furnished while this patient is under my care.  The services outlined above are required by this patient, and will be reviewed every 90 days.     PHYSICIAN: Anoop Trinh MD      DATE:     Please sign and return via fax to [phone].. Thank you, [facility].

## 2019-02-25 ENCOUNTER — TREATMENT (OUTPATIENT)
Dept: PHYSICAL THERAPY | Facility: CLINIC | Age: 64
End: 2019-02-25

## 2019-02-25 DIAGNOSIS — R26.89 IMPAIRED GAIT AND MOBILITY: ICD-10-CM

## 2019-02-25 DIAGNOSIS — M25.60 LIMITED JOINT RANGE OF MOTION (ROM): ICD-10-CM

## 2019-02-25 DIAGNOSIS — M25.571 ACUTE RIGHT ANKLE PAIN: Primary | ICD-10-CM

## 2019-02-25 PROCEDURE — 97014 ELECTRIC STIMULATION THERAPY: CPT | Performed by: PHYSICAL THERAPIST

## 2019-02-25 PROCEDURE — 97140 MANUAL THERAPY 1/> REGIONS: CPT | Performed by: PHYSICAL THERAPIST

## 2019-02-25 PROCEDURE — 97110 THERAPEUTIC EXERCISES: CPT | Performed by: PHYSICAL THERAPIST

## 2019-02-25 NOTE — PROGRESS NOTES
Physical Therapy Daily Progress Note        Patient: Kelsey Nicholson   : 1955  Diagnosis/ICD-10 Code:  Acute right ankle pain [M25.571]  Referring practitioner: Anoop Trinh MD  Date of Initial Visit: Type: THERAPY  Noted: 2019  Today's Date: 2019  Patient seen for 2 sessions         Kelsey Nicholson reports:       Subjective   Patient reports she has been having a lot of discomfort at night and having pain aching/throbbing pain up her leg.    Objective   See Exercise, Manual, and Modality Logs for complete treatment.       Assessment/Plan  Patient was able to perform program with exercise progression with some discomfort although no adverse symptoms.  Encouraged increased AROM bouts during the day at home and increased frequency of ice.  Progress per Plan of Care           Manual Therapy:    15     mins  59890;  Therapeutic Exercise:    10     mins  35325;     Neuromuscular Tiki:        mins  24492;    Therapeutic Activity:          mins  70318;     Gait Training:           mins  99678;     Ultrasound:          mins  48998;    Electrical Stimulation:    15     mins  64426 ( );  Dry Needling          mins self-pay    Timed Treatment:   25   mins   Total Treatment:     45   mins    Kenyatta Green PT  Physical Therapist

## 2019-02-27 ENCOUNTER — TREATMENT (OUTPATIENT)
Dept: PHYSICAL THERAPY | Facility: CLINIC | Age: 64
End: 2019-02-27

## 2019-02-27 DIAGNOSIS — M25.571 ACUTE RIGHT ANKLE PAIN: Primary | ICD-10-CM

## 2019-02-27 DIAGNOSIS — R26.89 IMPAIRED GAIT AND MOBILITY: ICD-10-CM

## 2019-02-27 DIAGNOSIS — M25.60 LIMITED JOINT RANGE OF MOTION (ROM): ICD-10-CM

## 2019-02-27 PROCEDURE — 97140 MANUAL THERAPY 1/> REGIONS: CPT | Performed by: PHYSICAL THERAPIST

## 2019-02-27 PROCEDURE — 97110 THERAPEUTIC EXERCISES: CPT | Performed by: PHYSICAL THERAPIST

## 2019-02-27 PROCEDURE — 97014 ELECTRIC STIMULATION THERAPY: CPT | Performed by: PHYSICAL THERAPIST

## 2019-02-27 NOTE — PROGRESS NOTES
Physical Therapy Daily Progress Note        Patient: Kelsey Nicholson   : 1955  Diagnosis/ICD-10 Code:  Acute right ankle pain [M25.571]  Referring practitioner: Anoop Trinh MD  Date of Initial Visit: Type: THERAPY  Noted: 2019  Today's Date: 2019  Patient seen for 3 sessions         Kelsey Nicholson reports:       Subjective   Patient reports she over did it yesterday just being up on her feet too much and her foot/ankle were really painful last night.  States she did ice her foot/ankle when she went to bed.    Objective   See Exercise, Manual, and Modality Logs for complete treatment.       Assessment/Plan  Patient continues to have high pain levels and swelling however she is beginning to gain more ROM in her ankle as well as improved scar mobility.  Progress per Plan of Care           Manual Therapy:    15     mins  98743;  Therapeutic Exercise:    10     mins  98334;     Neuromuscular Tiki:        mins  58955;    Therapeutic Activity:          mins  38215;     Gait Training:           mins  78254;     Ultrasound:          mins  62260;    Electrical Stimulation:    15     mins  65292 ( );  Dry Needling          mins self-pay    Timed Treatment:   25   mins   Total Treatment:     45   mins    Kenyatta Green PT  Physical Therapist

## 2019-03-04 ENCOUNTER — TREATMENT (OUTPATIENT)
Dept: PHYSICAL THERAPY | Facility: CLINIC | Age: 64
End: 2019-03-04

## 2019-03-04 DIAGNOSIS — M25.60 LIMITED JOINT RANGE OF MOTION (ROM): ICD-10-CM

## 2019-03-04 DIAGNOSIS — M25.571 ACUTE RIGHT ANKLE PAIN: Primary | ICD-10-CM

## 2019-03-04 DIAGNOSIS — R26.89 IMPAIRED GAIT AND MOBILITY: ICD-10-CM

## 2019-03-04 PROCEDURE — 97110 THERAPEUTIC EXERCISES: CPT | Performed by: PHYSICAL THERAPIST

## 2019-03-04 PROCEDURE — 97014 ELECTRIC STIMULATION THERAPY: CPT | Performed by: PHYSICAL THERAPIST

## 2019-03-04 PROCEDURE — 97140 MANUAL THERAPY 1/> REGIONS: CPT | Performed by: PHYSICAL THERAPIST

## 2019-03-07 ENCOUNTER — TREATMENT (OUTPATIENT)
Dept: PHYSICAL THERAPY | Facility: CLINIC | Age: 64
End: 2019-03-07

## 2019-03-07 DIAGNOSIS — R26.89 IMPAIRED GAIT AND MOBILITY: ICD-10-CM

## 2019-03-07 DIAGNOSIS — M25.60 LIMITED JOINT RANGE OF MOTION (ROM): ICD-10-CM

## 2019-03-07 DIAGNOSIS — M25.571 ACUTE RIGHT ANKLE PAIN: Primary | ICD-10-CM

## 2019-03-07 PROCEDURE — 97110 THERAPEUTIC EXERCISES: CPT | Performed by: PHYSICAL THERAPIST

## 2019-03-07 PROCEDURE — 97014 ELECTRIC STIMULATION THERAPY: CPT | Performed by: PHYSICAL THERAPIST

## 2019-03-07 PROCEDURE — 97140 MANUAL THERAPY 1/> REGIONS: CPT | Performed by: PHYSICAL THERAPIST

## 2019-03-07 NOTE — PROGRESS NOTES
Physical Therapy Daily Progress Note        Patient: Kelsey Nicholson   : 1955  Diagnosis/ICD-10 Code:  Acute right ankle pain [M25.571]  Referring practitioner: Anoop Trinh MD  Date of Initial Visit: Type: THERAPY  Noted: 2019  Today's Date: 3/7/2019  Patient seen for 5 sessions         Kelsey Nicholson reports:     Subjective Evaluation    Pain  Current pain ratin  At best pain ratin  At worst pain ratin      Patient reports she is still having a lot of pain in her ankle and leg as well as swelling.  Reports she felt a lot of pressure in the arch of her foot when walking to the bathroom in the middle of the night (no wearing the boot).     Objective       Passive Range of Motion     Right Ankle/Foot    Dorsiflexion (kf): 0 degrees    Plantar flexion: 35 degrees   Inversion: 25 degrees   Eversion: 5 degrees      See Exercise, Manual, and Modality Logs for complete treatment.       Assessment/Plan  Patient continues to have high pain level and pain avoidance.  ROM is improving although still limited.  She may benefit from a dynasplint to aide in DF ROM progression.    Progress per Plan of Care           Manual Therapy:    17     mins  43680;  Therapeutic Exercise:    15     mins  29072;     Neuromuscular Tiki:        mins  95400;    Therapeutic Activity:          mins  88313;     Gait Training:           mins  94834;     Ultrasound:          mins  06491;    Electrical Stimulation:    15     mins  19945 ( );  Dry Needling          mins self-pay    Timed Treatment:   32   mins   Total Treatment:     50   mins    Kenyatta Green PT  Physical Therapist

## 2019-03-12 ENCOUNTER — TREATMENT (OUTPATIENT)
Dept: PHYSICAL THERAPY | Facility: CLINIC | Age: 64
End: 2019-03-12

## 2019-03-12 DIAGNOSIS — M25.60 LIMITED JOINT RANGE OF MOTION (ROM): ICD-10-CM

## 2019-03-12 DIAGNOSIS — R26.89 IMPAIRED GAIT AND MOBILITY: ICD-10-CM

## 2019-03-12 DIAGNOSIS — M25.571 ACUTE RIGHT ANKLE PAIN: Primary | ICD-10-CM

## 2019-03-12 PROCEDURE — 97014 ELECTRIC STIMULATION THERAPY: CPT | Performed by: PHYSICAL THERAPIST

## 2019-03-12 PROCEDURE — 97140 MANUAL THERAPY 1/> REGIONS: CPT | Performed by: PHYSICAL THERAPIST

## 2019-03-12 PROCEDURE — 97110 THERAPEUTIC EXERCISES: CPT | Performed by: PHYSICAL THERAPIST

## 2019-03-12 NOTE — PROGRESS NOTES
Physical Therapy Daily Progress Note        Patient: Kelsey Nicholson   : 1955  Diagnosis/ICD-10 Code:  Acute right ankle pain [M25.571]  Referring practitioner: Anoop Trinh MD  Date of Initial Visit: Type: THERAPY  Noted: 2019  Today's Date: 3/12/2019  Patient seen for 6 sessions         Kelsey Nicholson reports:      Subjective   Patient reports the doctor wants her to take another month off work and gave her the okay to start walking out of the boot.    Objective   See Exercise, Manual, and Modality Logs for complete treatment.       Assessment/Plan  Patient demonstrates improved ankle ROM and midfoot segmental mobility.  Progressed with gait training using SPC without boot.  Advised patient on use of lace up ankle support to bridge from boot.  Progress per Plan of Care           Manual Therapy:    12     mins  26978;  Therapeutic Exercise:    15     mins  27977;     Neuromuscular Tiki:        mins  96293;    Therapeutic Activity:          mins  21644;     Gait Trainin      mins  55874;     Ultrasound:          mins  32854;    Electrical Stimulation:    15     mins  55278 ( );  Dry Needling          mins self-pay    Timed Treatment:   32   mins   Total Treatment:     50   mins    Kenyatta Green PT  Physical Therapist

## 2019-03-14 ENCOUNTER — TREATMENT (OUTPATIENT)
Dept: PHYSICAL THERAPY | Facility: CLINIC | Age: 64
End: 2019-03-14

## 2019-03-14 DIAGNOSIS — R26.89 IMPAIRED GAIT AND MOBILITY: ICD-10-CM

## 2019-03-14 DIAGNOSIS — M25.60 LIMITED JOINT RANGE OF MOTION (ROM): ICD-10-CM

## 2019-03-14 DIAGNOSIS — M25.571 ACUTE RIGHT ANKLE PAIN: Primary | ICD-10-CM

## 2019-03-14 PROCEDURE — 97110 THERAPEUTIC EXERCISES: CPT | Performed by: PHYSICAL THERAPIST

## 2019-03-14 PROCEDURE — 97014 ELECTRIC STIMULATION THERAPY: CPT | Performed by: PHYSICAL THERAPIST

## 2019-03-14 PROCEDURE — 97140 MANUAL THERAPY 1/> REGIONS: CPT | Performed by: PHYSICAL THERAPIST

## 2019-03-14 NOTE — PROGRESS NOTES
Physical Therapy Daily Progress Note        Patient: Kelsey Nicholson   : 1955  Diagnosis/ICD-10 Code:  Acute right ankle pain [M25.571]  Referring practitioner: Anoop Trinh MD  Date of Initial Visit: Type: THERAPY  Noted: 2019  Today's Date: 3/14/2019  Patient seen for 7 sessions         Kelsey Nicholson reports:       Subjective   Patient reports her ankle is still sore but feeling a little better.      Objective   See Exercise, Manual, and Modality Logs for complete treatment.       Assessment/Plan  Ankle and foot ROM is improving as well as improving scar mobility.  High pain levels persist with pain avoidance during WB.  Progress per Plan of Care           Manual Therapy:    13     mins  73329;  Therapeutic Exercise:    15     mins  49958;     Neuromuscular Tiki:        mins  87482;    Therapeutic Activity:          mins  49632;     Gait Trainin      mins  40047;     Ultrasound:          mins  28780;    Electrical Stimulation:    15     mins  05820 (MC );  Dry Needling          mins self-pay    Timed Treatment:   33   mins   Total Treatment:     50   mins    Kenyatta Green PT  Physical Therapist

## 2019-03-19 ENCOUNTER — TREATMENT (OUTPATIENT)
Dept: PHYSICAL THERAPY | Facility: CLINIC | Age: 64
End: 2019-03-19

## 2019-03-19 DIAGNOSIS — M25.60 LIMITED JOINT RANGE OF MOTION (ROM): ICD-10-CM

## 2019-03-19 DIAGNOSIS — R26.89 IMPAIRED GAIT AND MOBILITY: ICD-10-CM

## 2019-03-19 DIAGNOSIS — M25.571 ACUTE RIGHT ANKLE PAIN: Primary | ICD-10-CM

## 2019-03-19 PROCEDURE — 97110 THERAPEUTIC EXERCISES: CPT | Performed by: PHYSICAL THERAPIST

## 2019-03-19 PROCEDURE — 97140 MANUAL THERAPY 1/> REGIONS: CPT | Performed by: PHYSICAL THERAPIST

## 2019-03-19 PROCEDURE — 97014 ELECTRIC STIMULATION THERAPY: CPT | Performed by: PHYSICAL THERAPIST

## 2019-03-19 NOTE — PROGRESS NOTES
Physical Therapy Daily Progress Note        Patient: Kelsey Nicholson   : 1955  Diagnosis/ICD-10 Code:  Acute right ankle pain [M25.571]  Referring practitioner: Anoop Trinh MD  Date of Initial Visit: Type: THERAPY  Noted: 2019  Today's Date: 3/19/2019  Patient seen for 8 sessions         Kelsey Nicholson reports:       Subjective   Patient reports her ankle feels a lot better not wearing the boot.  States she is still having pain and stiffness and swelling.  Reports she has been having a flare of low back pain also.    Objective   See Exercise, Manual, and Modality Logs for complete treatment.       Assessment/Plan  Patient performed advanced exercises this session without adverse symptoms.  She has improved ROM and flexibility although limitations persist.  Encouraged frequent mobility exercises followed by elevation and ice at home.  Progress per Plan of Care           Manual Therapy:    13     mins  21071;  Therapeutic Exercise:    20     mins  69994 (10 minutes concurrently);     Neuromuscular Tiki:        mins  58832;    Therapeutic Activity:          mins  96117;     Gait Training:           mins  03038;     Ultrasound:          mins  31092;    Electrical Stimulation:    15     mins  36154 ( );  Dry Needling          mins self-pay    Timed Treatment:   33   mins   Total Treatment:     60   mins    Kenyatta Green PT  Physical Therapist

## 2019-03-21 ENCOUNTER — TREATMENT (OUTPATIENT)
Dept: PHYSICAL THERAPY | Facility: CLINIC | Age: 64
End: 2019-03-21

## 2019-03-21 DIAGNOSIS — R26.89 IMPAIRED GAIT AND MOBILITY: ICD-10-CM

## 2019-03-21 DIAGNOSIS — M25.571 ACUTE RIGHT ANKLE PAIN: Primary | ICD-10-CM

## 2019-03-21 DIAGNOSIS — M25.60 LIMITED JOINT RANGE OF MOTION (ROM): ICD-10-CM

## 2019-03-21 PROCEDURE — 97014 ELECTRIC STIMULATION THERAPY: CPT | Performed by: PHYSICAL THERAPIST

## 2019-03-21 PROCEDURE — 97110 THERAPEUTIC EXERCISES: CPT | Performed by: PHYSICAL THERAPIST

## 2019-03-21 PROCEDURE — 97140 MANUAL THERAPY 1/> REGIONS: CPT | Performed by: PHYSICAL THERAPIST

## 2019-03-21 NOTE — PROGRESS NOTES
Physical Therapy Re-Assessment / Re-Certification        Patient: Kelsey Nicholson   : 1955  Diagnosis/ICD-10 Code:  Acute right ankle pain [M25.571]  Referring practitioner: Anoop Trinh MD  Date of Initial Visit: Type: THERAPY  Noted: 2019  Today's Date: 3/21/2019  Patient seen for 9 sessions      Subjective:   Kelsey Nicholson reports:   Clinical Progress: improved  Home Program Compliance: Yes  Treatment has included: therapeutic exercise, manual therapy, electrical stimulation and cryotherapy    Subjective Evaluation    Pain  Current pain rating: 3  At best pain ratin  At worst pain ratin  Quality: burning, dull ache and throbbing           Objective       Active Range of Motion     Right Ankle/Foot   Dorsiflexion (ke): 0 degrees   Dorsiflexion (kf): 2 degrees   Plantar flexion: 35 degrees   Inversion: 12 degrees   Eversion: 3 degrees     Strength/Myotome Testing     Right Ankle/Foot   Dorsiflexion: 3+  Plantar flexion: 3  Inversion: 3+  Eversion: 3+    Additional Strength Details  Within available ROM     Assessment/Plan   Patient demonstrates improved ROM and strength as well as improved gait pattern.  Deficits persist as well as high pain levels.  She is progressing with PT interventions and will benefit from continued PT.  Progress toward previous goals: Partially Met    Goal Review/Upgrade  Short Term Goals (3-4 wks):  1.  Patient will have increased ankle DF to 10 degrees and PF to 40 degrees.  2.  Patient will have increased ankle Inversion to 30 degrees and Eversion to 10 degrees.  3.  Patient will have decreased pain to 0/10 to allow for increased comfort with functional activities and allow for improved restorative sleep.  4.  Patient will have increased LE muscle flexibility to WNLs.  5.  Patient will have ankle and foot joint segmental mobility WNLs.    Long Term Goals (4-6 wks):  1.  Patient will have increased ankle strength to 5/5.  2.  Patient will have improved  LEFS score of 60/80 or higher.  3.  Patient will be independent in performance of HEP for carryover upon discharge from skilled PT services.  4.  Patient will have normalized gait without AD.  5.  Patient will report return to performance of ADLs/IADLs/Work/Leisure activities with minimal to no symptom reproduction/pain limitations.      Recommendations: Continue as planned  Timeframe: 1 month  Prognosis to achieve goals: good    PT Signature: Kenyatta Green, PT      Based upon review of the patient's progress and continued therapy plan, it is my medical opinion that Kelsey Nicholson should continue physical therapy treatment at [facility]  [address] [place] [id number] [phone].    Signature: __________________________________  Anoop Trinh MD    Manual Therapy:    12     mins  31879;  Therapeutic Exercise:    20     mins  54351;     Neuromuscular Tiki:        mins  46571;    Therapeutic Activity:          mins  78756;     Gait Training:           mins  89760;     Ultrasound:          mins  11213;    Electrical Stimulation:    15     mins  02631 ( );  Dry Needling          mins self-pay    Timed Treatment:   32   mins   Total Treatment:     50   mins

## 2019-03-26 ENCOUNTER — TREATMENT (OUTPATIENT)
Dept: PHYSICAL THERAPY | Facility: CLINIC | Age: 64
End: 2019-03-26

## 2019-03-26 DIAGNOSIS — M25.60 LIMITED JOINT RANGE OF MOTION (ROM): ICD-10-CM

## 2019-03-26 DIAGNOSIS — M25.571 ACUTE RIGHT ANKLE PAIN: Primary | ICD-10-CM

## 2019-03-26 DIAGNOSIS — R26.89 IMPAIRED GAIT AND MOBILITY: ICD-10-CM

## 2019-03-26 PROCEDURE — 97110 THERAPEUTIC EXERCISES: CPT | Performed by: PHYSICAL THERAPIST

## 2019-03-26 PROCEDURE — 97014 ELECTRIC STIMULATION THERAPY: CPT | Performed by: PHYSICAL THERAPIST

## 2019-03-26 PROCEDURE — 97140 MANUAL THERAPY 1/> REGIONS: CPT | Performed by: PHYSICAL THERAPIST

## 2019-03-26 NOTE — PROGRESS NOTES
Physical Therapy Daily Progress Note        Patient: Kelsey Nicholson   : 1955  Diagnosis/ICD-10 Code:  Acute right ankle pain [M25.571]  Referring practitioner: Anoop Trinh MD  Date of Initial Visit: Type: THERAPY  Noted: 2019  Today's Date: 3/26/2019  Patient seen for 10 sessions         Kelsey Nicholson reports:     Subjective   Patient reports she is still having a lot of burning around the scar and across the top of the ankle.    Objective   See Exercise, Manual, and Modality Logs for complete treatment.       Assessment/Plan  Patient demonstrates improving ROM and soft tissue flexibility. Gait pattern is smoothing out and increased levon.  Progress per Plan of Care           Manual Therapy:    13     mins  86829;  Therapeutic Exercise:    20     mins  87473;     Neuromuscular Tiki:        mins  56764;    Therapeutic Activity:          mins  29997;     Gait Training:           mins  53061;     Ultrasound:          mins  10398;    Electrical Stimulation:    15     mins  01967 (MC );  Dry Needling          mins self-pay    Timed Treatment:   33   mins   Total Treatment:     50   mins    Kenyatta Green PT  Physical Therapist

## 2019-03-28 ENCOUNTER — TREATMENT (OUTPATIENT)
Dept: PHYSICAL THERAPY | Facility: CLINIC | Age: 64
End: 2019-03-28

## 2019-03-28 DIAGNOSIS — M25.60 LIMITED JOINT RANGE OF MOTION (ROM): ICD-10-CM

## 2019-03-28 DIAGNOSIS — R26.89 IMPAIRED GAIT AND MOBILITY: ICD-10-CM

## 2019-03-28 DIAGNOSIS — M25.571 ACUTE RIGHT ANKLE PAIN: Primary | ICD-10-CM

## 2019-03-28 PROCEDURE — 97110 THERAPEUTIC EXERCISES: CPT | Performed by: PHYSICAL THERAPIST

## 2019-03-28 PROCEDURE — 97014 ELECTRIC STIMULATION THERAPY: CPT | Performed by: PHYSICAL THERAPIST

## 2019-03-28 PROCEDURE — 97140 MANUAL THERAPY 1/> REGIONS: CPT | Performed by: PHYSICAL THERAPIST

## 2019-03-28 NOTE — PROGRESS NOTES
Physical Therapy Daily Progress Note        Patient: Kelsey Nicholson   : 1955  Diagnosis/ICD-10 Code:  Acute right ankle pain [M25.571]  Referring practitioner: Anoop Trinh MD  Date of Initial Visit: Type: THERAPY  Noted: 2019  Today's Date: 3/28/2019  Patient seen for 11 sessions         Kelsey Nicholson reports:       Subjective   Patient reports her low back has still been bothering her, her ankle and foot were a little more sore after the new stretching last session although that soreness resolved by the next day.      Objective   See Exercise, Manual, and Modality Logs for complete treatment.       Assessment/Plan  Patient presents with improving gait pattern although deviations are still present.  Progressed exercises this session with no adverse symptoms.  Encouraged weaning from using the cane within her home and to perform AROM exercises little bits often during the day at home followed by ice and elevation.  Progress per Plan of Care           Manual Therapy:    13     mins  77745;  Therapeutic Exercise:    30     mins  77552;     Neuromuscular Tiki:        mins  10945;    Therapeutic Activity:          mins  83558;     Gait Training:           mins  49173;     Ultrasound:          mins  44082;    Electrical Stimulation:    15     mins  95659 ( );  Dry Needling          mins self-pay    Timed Treatment:   43   mins   Total Treatment:     60   mins    Kenyatta Green PT  Physical Therapist

## 2019-04-02 ENCOUNTER — TREATMENT (OUTPATIENT)
Dept: PHYSICAL THERAPY | Facility: CLINIC | Age: 64
End: 2019-04-02

## 2019-04-02 DIAGNOSIS — R26.89 IMPAIRED GAIT AND MOBILITY: ICD-10-CM

## 2019-04-02 DIAGNOSIS — M25.60 LIMITED JOINT RANGE OF MOTION (ROM): ICD-10-CM

## 2019-04-02 DIAGNOSIS — M25.571 ACUTE RIGHT ANKLE PAIN: Primary | ICD-10-CM

## 2019-04-02 PROCEDURE — 97110 THERAPEUTIC EXERCISES: CPT | Performed by: PHYSICAL THERAPIST

## 2019-04-02 PROCEDURE — 97014 ELECTRIC STIMULATION THERAPY: CPT | Performed by: PHYSICAL THERAPIST

## 2019-04-02 PROCEDURE — 97140 MANUAL THERAPY 1/> REGIONS: CPT | Performed by: PHYSICAL THERAPIST

## 2019-04-02 NOTE — PROGRESS NOTES
Physical Therapy Daily Progress Note        Patient: Kelsey Nicholson   : 1955  Diagnosis/ICD-10 Code:  Acute right ankle pain [M25.571]  Referring practitioner: Anoop Trinh MD  Date of Initial Visit: Type: THERAPY  Noted: 2019  Today's Date: 2019  Patient seen for 12 sessions         Kelsey Nicholson reports:       Subjective   Patient reports she is trying to wean from cane within the house but at times she has to use it because of pain in the ankle.  Stated she is still having burning and itching in the scar area and at night time she is awaked by her foot/ankle jumping and hurting.    Objective   See Exercise, Manual, and Modality Logs for complete treatment.       Assessment/Plan  Patient is progressing with ROM and strength.  She continues to have pain limited function.  Progress per Plan of Care           Manual Therapy:    15     mins  67344;  Therapeutic Exercise:    23     mins  89661;     Neuromuscular Tiki:        mins  98215;    Therapeutic Activity:          mins  35729;     Gait Training:           mins  61573;     Ultrasound:          mins  97363;    Electrical Stimulation:    15     mins  95600 ( );  Dry Needling          mins self-pay    Timed Treatment:   38   mins   Total Treatment:     60   mins    Kenyatta Green PT  Physical Therapist

## 2019-04-04 ENCOUNTER — TREATMENT (OUTPATIENT)
Dept: PHYSICAL THERAPY | Facility: CLINIC | Age: 64
End: 2019-04-04

## 2019-04-04 DIAGNOSIS — R26.89 IMPAIRED GAIT AND MOBILITY: ICD-10-CM

## 2019-04-04 DIAGNOSIS — M25.60 LIMITED JOINT RANGE OF MOTION (ROM): ICD-10-CM

## 2019-04-04 DIAGNOSIS — M25.571 ACUTE RIGHT ANKLE PAIN: Primary | ICD-10-CM

## 2019-04-04 PROCEDURE — 97140 MANUAL THERAPY 1/> REGIONS: CPT | Performed by: PHYSICAL THERAPIST

## 2019-04-04 PROCEDURE — 97110 THERAPEUTIC EXERCISES: CPT | Performed by: PHYSICAL THERAPIST

## 2019-04-04 NOTE — PROGRESS NOTES
Physical Therapy Daily Progress Note        Patient: Kelsey Nicholson   : 1955  Diagnosis/ICD-10 Code:  Acute right ankle pain [M25.571]  Referring practitioner: Anoop Tirnh MD  Date of Initial Visit: Type: THERAPY  Noted: 2019  Today's Date: 2019  Patient seen for 13 sessions         Kelsey Nicholson reports:     Subjective   Patient reports she was on her feet a lot yesterday and over did it because she has had a lot more swelling and pain.      Objective   See Exercise, Manual, and Modality Logs for complete treatment.       Assessment/Plan  Patient tolerated treatment well with improved ROM post treatment.  Encouraged intermittent activity balanced with periods of rest/elevation/ice.  Progress per Plan of Care           Manual Therapy:    10     mins  28930;  Therapeutic Exercise:    30     mins  77486;     Neuromuscular Tiki:        mins  56023;    Therapeutic Activity:          mins  68959;     Gait Training:           mins  42695;     Ultrasound:          mins  58911;    Electrical Stimulation:         mins  04991 ( );  Dry Needling          mins self-pay  Ionto   6 minutes    Timed Treatment:   46   mins   Total Treatment:     60   mins    Kenyatta Green PT  Physical Therapist

## 2019-04-09 ENCOUNTER — TREATMENT (OUTPATIENT)
Dept: PHYSICAL THERAPY | Facility: CLINIC | Age: 64
End: 2019-04-09

## 2019-04-09 DIAGNOSIS — M25.571 ACUTE RIGHT ANKLE PAIN: Primary | ICD-10-CM

## 2019-04-09 DIAGNOSIS — R26.89 IMPAIRED GAIT AND MOBILITY: ICD-10-CM

## 2019-04-09 DIAGNOSIS — M25.60 LIMITED JOINT RANGE OF MOTION (ROM): ICD-10-CM

## 2019-04-09 PROCEDURE — 97140 MANUAL THERAPY 1/> REGIONS: CPT | Performed by: PHYSICAL THERAPIST

## 2019-04-09 PROCEDURE — 97110 THERAPEUTIC EXERCISES: CPT | Performed by: PHYSICAL THERAPIST

## 2019-04-09 NOTE — PROGRESS NOTES
Physical Therapy Daily Progress Note        Patient: Kelsey Nicholson   : 1955  Diagnosis/ICD-10 Code:  Acute right ankle pain [M25.571]  Referring practitioner: Anoop Trinh MD  Date of Initial Visit: Type: THERAPY  Noted: 2019  Today's Date: 2019  Patient seen for 14 sessions         Kelsey Nicholson reports:       Subjective   Patient reports she did not notice any relief from the trial of ionto treatment last session.      Objective   See Exercise, Manual, and Modality Logs for complete treatment.       Assessment/Plan  Patient continues to have pain limited mobility and WB tolerance in right foot/ankle.  She has improved with PT treatments although progress is slow.  She expressed willingness to try DN.  Soft tissue was assessed at right leg/ankle. PT noted point tenderness as well as palpable trigger points within the muslce tissue. On this date patient stated that they would like to undergo a trial dry needling procedure for the soft tissue dysfunction. Patient was educated on the procedure for dry needling and consent waver was signed. Patient was informed of the risks, possible adverse effects, along with the benefits of DN. Performed a trial of DN to determine her tolerance and response.  Progress per Plan of Care           Manual Therapy:    13     mins  96535;  Therapeutic Exercise:    20     mins  07752;     Neuromuscular Tiki:        mins  90815;    Therapeutic Activity:          mins  12525;     Gait Training:           mins  17863;     Ultrasound:          mins  48164;    Electrical Stimulation:         mins  31347 ( );  Dry Needling     3     mins self-pay    Timed Treatment:   33   mins   Total Treatment:     45   mins    Kenyatta Green PT  Physical Therapist

## 2019-04-11 ENCOUNTER — TREATMENT (OUTPATIENT)
Dept: PHYSICAL THERAPY | Facility: CLINIC | Age: 64
End: 2019-04-11

## 2019-04-11 DIAGNOSIS — M25.60 LIMITED JOINT RANGE OF MOTION (ROM): ICD-10-CM

## 2019-04-11 DIAGNOSIS — M25.571 ACUTE RIGHT ANKLE PAIN: Primary | ICD-10-CM

## 2019-04-11 DIAGNOSIS — R26.89 IMPAIRED GAIT AND MOBILITY: ICD-10-CM

## 2019-04-11 PROCEDURE — 97014 ELECTRIC STIMULATION THERAPY: CPT | Performed by: PHYSICAL THERAPIST

## 2019-04-11 PROCEDURE — 97140 MANUAL THERAPY 1/> REGIONS: CPT | Performed by: PHYSICAL THERAPIST

## 2019-04-11 PROCEDURE — 97110 THERAPEUTIC EXERCISES: CPT | Performed by: PHYSICAL THERAPIST

## 2019-04-16 ENCOUNTER — APPOINTMENT (OUTPATIENT)
Dept: SLEEP MEDICINE | Facility: HOSPITAL | Age: 64
End: 2019-04-16
Attending: INTERNAL MEDICINE

## 2019-04-25 ENCOUNTER — TREATMENT (OUTPATIENT)
Dept: PHYSICAL THERAPY | Facility: CLINIC | Age: 64
End: 2019-04-25

## 2019-04-25 DIAGNOSIS — M25.571 ACUTE RIGHT ANKLE PAIN: Primary | ICD-10-CM

## 2019-04-25 DIAGNOSIS — R26.89 IMPAIRED GAIT AND MOBILITY: ICD-10-CM

## 2019-04-25 DIAGNOSIS — M25.60 LIMITED JOINT RANGE OF MOTION (ROM): ICD-10-CM

## 2019-04-25 PROCEDURE — 97110 THERAPEUTIC EXERCISES: CPT | Performed by: PHYSICAL THERAPIST

## 2019-04-25 PROCEDURE — 97014 ELECTRIC STIMULATION THERAPY: CPT | Performed by: PHYSICAL THERAPIST

## 2019-04-25 PROCEDURE — 97140 MANUAL THERAPY 1/> REGIONS: CPT | Performed by: PHYSICAL THERAPIST

## 2019-04-25 NOTE — PROGRESS NOTES
Re-Assessment / Re-Certification        Patient: Kelsey Nicholson   : 1955  Diagnosis/ICD-10 Code:  Acute right ankle pain [M25.571]  Referring practitioner: Anoop Trinh MD  Date of Initial Visit: Type: THERAPY  Noted: 2019  Today's Date: 2019  Patient seen for 16 sessions      Subjective:   Kelsey Nicholson reports:   Subjective Questionnaire: LEFS:   Clinical Progress: improved  Home Program Compliance: Yes  Treatment has included: therapeutic exercise, manual therapy, electrical stimulation and cryotherapy    Subjective Evaluation    Pain  Current pain ratin  At best pain ratin  At worst pain ratin  Quality: burning and sharp      Patient reports she had surgery to remove one of the screws in her ankle last week on .        Objective       Active Range of Motion     Right Ankle/Foot   Dorsiflexion (ke): 0 degrees   Dorsiflexion (kf): 5 degrees   Plantar flexion: 30 degrees   Inversion: 20 degrees   Eversion: 12 degrees        Strength/Myotome Testing      Right Ankle/Foot   Dorsiflexion: 4  Plantar flexion: 3  Inversion: 4-  Eversion: 3      Assessment/Plan   Patient presents with improved ROM and decreased pain with movement and WB.  She is ambulating without brace although her gait mechanics are still altered due to limited ROM, strength and pain.  Progress toward previous goals: Partially Met    Goal Review  Short Term Goals (3 wks):  1.  Patient will have increased ankle DF to 10 degrees and PF to 40 degrees.  2.  Patient will have increased ankle Inversion to 35 degrees and Eversion to 20 degrees.  3.  Patient will have decreased pain to 3/10 at worst to allow for increased comfort with functional activities.  4.  Patient will have increased LE muscle flexibility to WNLs.  5.  Patient will have ankle and foot joint segmental mobility WNLs.    Long Term Goals (4 wks):  1.  Patient will have increased ankle strength to 5/5.  2.  Patient will have improved  LEFS score of 50/80 or higher.  3.  Patient will be independent in performance of HEP for carryover upon discharge from skilled PT services.  4.  Patient will have normalized gait without AD.  5.  Patient will report return to performance of ADLs/IADLs/Work/Leisure activities with minimal to no symptom reproduction/pain limitations.      Recommendations: Continue as planned  Timeframe: 1 month  Prognosis to achieve goals: good    PT Signature: Kenyatta Green, PT      Based upon review of the patient's progress and continued therapy plan, it is my medical opinion that Kelsey Nicholson should continue physical therapy treatment at [facility]  [address] [place] [id number] [phone].    Signature: __________________________________  Anoop Trinh MD    Manual Therapy:    10     mins  84895;  Therapeutic Exercise:    15     mins  96722;     Neuromuscular Tiki:        mins  28518;    Therapeutic Activity:          mins  45219;     Gait Training:           mins  95243;     Ultrasound:          mins  70904;    Electrical Stimulation:    15     mins  44928 ( );  Dry Needling          mins self-pay    Timed Treatment:   25   mins   Total Treatment:     45   mins

## 2019-04-30 ENCOUNTER — TREATMENT (OUTPATIENT)
Dept: PHYSICAL THERAPY | Facility: CLINIC | Age: 64
End: 2019-04-30

## 2019-04-30 DIAGNOSIS — M25.571 ACUTE RIGHT ANKLE PAIN: Primary | ICD-10-CM

## 2019-04-30 DIAGNOSIS — R26.89 IMPAIRED GAIT AND MOBILITY: ICD-10-CM

## 2019-04-30 DIAGNOSIS — M25.60 LIMITED JOINT RANGE OF MOTION (ROM): ICD-10-CM

## 2019-04-30 PROCEDURE — 97014 ELECTRIC STIMULATION THERAPY: CPT | Performed by: PHYSICAL THERAPIST

## 2019-04-30 PROCEDURE — 97110 THERAPEUTIC EXERCISES: CPT | Performed by: PHYSICAL THERAPIST

## 2019-04-30 PROCEDURE — 97140 MANUAL THERAPY 1/> REGIONS: CPT | Performed by: PHYSICAL THERAPIST

## 2019-04-30 NOTE — PROGRESS NOTES
Physical Therapy Daily Progress Note        Patient: Kelsey Nicholson   : 1955  Diagnosis/ICD-10 Code:  Acute right ankle pain [M25.571]  Referring practitioner: Anoop Trinh MD  Date of Initial Visit: Type: THERAPY  Noted: 2019  Today's Date: 2019  Patient seen for 17 sessions         Kelsey Nicholson reports:     Subjective   Patient reports she is still having the pain with transitions for sitting to standing/walking and increased swelling if she stands too long.    Objective   See Exercise, Manual, and Modality Logs for complete treatment.       Assessment/Plan  Patient tolerated exercise progression well with improved mobility/ROM.  Progressed HEP and encouraged frequent bouts of light activity throughout the day.  Progress per Plan of Care           Manual Therapy:    10     mins  05842;  Therapeutic Exercise:    25     mins  82115;     Neuromuscular Tiki:        mins  19074;    Therapeutic Activity:          mins  51748;     Gait Training:           mins  45344;     Ultrasound:          mins  58653;    Electrical Stimulation:    15     mins  73060 ( );  Dry Needling          mins self-pay    Timed Treatment:   35   mins   Total Treatment:     55   mins    Kenyatta Green PT  Physical Therapist

## 2019-05-08 ENCOUNTER — LAB (OUTPATIENT)
Dept: LAB | Facility: HOSPITAL | Age: 64
End: 2019-05-08

## 2019-05-08 ENCOUNTER — OFFICE VISIT (OUTPATIENT)
Dept: CARDIOLOGY | Facility: CLINIC | Age: 64
End: 2019-05-08

## 2019-05-08 VITALS
HEIGHT: 65 IN | BODY MASS INDEX: 30.66 KG/M2 | SYSTOLIC BLOOD PRESSURE: 132 MMHG | DIASTOLIC BLOOD PRESSURE: 66 MMHG | WEIGHT: 184 LBS | HEART RATE: 78 BPM

## 2019-05-08 DIAGNOSIS — I34.1 MVP (MITRAL VALVE PROLAPSE): ICD-10-CM

## 2019-05-08 DIAGNOSIS — R60.0 LOCALIZED EDEMA: Primary | ICD-10-CM

## 2019-05-08 DIAGNOSIS — E87.6 HYPOKALEMIA: ICD-10-CM

## 2019-05-08 DIAGNOSIS — I10 BENIGN ESSENTIAL HYPERTENSION: ICD-10-CM

## 2019-05-08 LAB
ANION GAP SERPL CALCULATED.3IONS-SCNC: 13.6 MMOL/L
BUN BLD-MCNC: 12 MG/DL (ref 8–23)
BUN/CREAT SERPL: 14.5 (ref 7–25)
CALCIUM SPEC-SCNC: 9.7 MG/DL (ref 8.6–10.5)
CHLORIDE SERPL-SCNC: 101 MMOL/L (ref 98–107)
CO2 SERPL-SCNC: 29.4 MMOL/L (ref 22–29)
CREAT BLD-MCNC: 0.83 MG/DL (ref 0.57–1)
GFR SERPL CREATININE-BSD FRML MDRD: 84 ML/MIN/1.73
GLUCOSE BLD-MCNC: 91 MG/DL (ref 65–99)
POTASSIUM BLD-SCNC: 3.5 MMOL/L (ref 3.5–5.2)
SODIUM BLD-SCNC: 144 MMOL/L (ref 136–145)

## 2019-05-08 PROCEDURE — 93000 ELECTROCARDIOGRAM COMPLETE: CPT | Performed by: INTERNAL MEDICINE

## 2019-05-08 PROCEDURE — 36415 COLL VENOUS BLD VENIPUNCTURE: CPT

## 2019-05-08 PROCEDURE — 99213 OFFICE O/P EST LOW 20 MIN: CPT | Performed by: INTERNAL MEDICINE

## 2019-05-08 PROCEDURE — 80048 BASIC METABOLIC PNL TOTAL CA: CPT

## 2019-05-08 RX ORDER — POTASSIUM CHLORIDE 20 MEQ/1
20 TABLET, EXTENDED RELEASE ORAL DAILY
Qty: 90 TABLET | Refills: 1 | Status: SHIPPED | OUTPATIENT
Start: 2019-05-08 | End: 2019-12-18

## 2019-05-08 RX ORDER — LISINOPRIL 10 MG/1
10 TABLET ORAL DAILY
Qty: 90 TABLET | Refills: 3 | Status: SHIPPED | OUTPATIENT
Start: 2019-05-08 | End: 2019-06-14 | Stop reason: SDUPTHER

## 2019-05-08 NOTE — PROGRESS NOTES
Date of Office Visit: 2019  Encounter Provider: Kaylah Blancas MD  Place of Service: [facility] CARDIOLOGY  Patient Name: Kelsey Nicholson  :1955    Chief complaint  Follow-up of mitral valve prolapse and mitral regurgitation    History of Present Illness  Patient is a 64-year-old female with history of hyperlipidemia, hypertension, GE reflux.  She has a distant history of mitral valve prolapse with trivial mitral regurgitation noted previously by JAMES.  She had an echocardiogram May 2018 that showed normal systolic function grade 1 diastolic dysfunction structurally normal mitral valve with trivial mitral regurgitation.  She had a stress echocardiogram that was negative for ischemia.    Since last visit the pressures been like it is today except on one occasion when she needed a spinal injection her blood pressure was 200/101.  Unfortunately has any chest pain, shortness of breath, palpitations, syncope near syncope.  She  has mild edema of the right ankle.    Past Medical History:   Diagnosis Date   • Abdominal pain    • Cough    • Daytime somnolence    • GERD (gastroesophageal reflux disease)    • Hyperlipidemia    • Hyperplastic colon polyp    • Hypertension    • Irritable bowel syndrome    • Mitral valve prolapse    • Osteoarthritis    • Pain of left thumb    • Pure hypercholesterolemia    • Snoring      Past Surgical History:   Procedure Laterality Date   • ANKLE HARDWARE REMOVAL  2019    [place]   • BREAST BIOPSY     • CHOLECYSTECTOMY  2013   • COLONOSCOPY  10/16/2013    IH, Hyperplastic polyp    • COLONOSCOPY  2018    Diverticulosis in the sigmoid colon and in the descending colon, colon polyp   • ENDOSCOPY  10/16/2013    gastritis.     • HYSTERECTOMY     • OOPHORECTOMY     • ROTATOR CUFF REPAIR Left 2014   • UPPER GASTROINTESTINAL ENDOSCOPY  2018    Acute erosive gastritis   • URETHRAL SUSPENSION       Outpatient Medications Prior to  Visit   Medication Sig Dispense Refill   • Ascorbic Acid (VITAMIN C) 1000 MG tablet Take 1,000 mg by mouth daily.     • B Complex-C-E-Zn (B COMPLEX-C-E-ZINC) tablet Take 1 tablet by mouth Daily.     • Cholecalciferol 1000 UNITS capsule Take  by mouth.     • dexlansoprazole (DEXILANT) 60 MG capsule Take 1 capsule by mouth Every Morning Before Breakfast. 90 capsule 2   • EPINEPHrine (EPIPEN) 0.3 MG/0.3ML solution auto-injector 0.3 mg once. Inject intramuscularly as directed     • fluticasone (FLONASE) 50 MCG/ACT nasal spray SHAKE WELL AND USE 2 SPRAYS IN EACH NOSTRIL DAILY 16 g 11   • folic acid (FOLVITE) 400 MCG tablet Take 400 mcg by mouth Daily.     • Ginger, Zingiber officinalis, (GINGER ROOT PO) Take by mouth.     • hydrochlorothiazide (MICROZIDE) 12.5 MG capsule TAKE 1 CAPSULE BY MOUTH DAILY 30 capsule 5   • hyoscyamine (LEVSIN) 0.125 MG SL tablet Dissolve 1-2 tablets under the tongue every 4 hours as needed for abdominal pain 60 tablet 2   • Multiple Vitamin (MULTIVITAMIN) tablet Take 1 tablet by mouth daily.     • sucralfate (CARAFATE) 1 g tablet TAKE 1 TABLET BY MOUTH FOUR TIMES DAILY 360 tablet 0   • Zinc Sulfate 66 MG tablet Take  by mouth.     • lisinopril (PRINIVIL,ZESTRIL) 10 MG tablet Take 1 tablet by mouth Daily. 30 tablet 11   • potassium chloride (K-DUR,KLOR-CON) 20 MEQ CR tablet TAKE 1 TABLET BY MOUTH DAILY 90 tablet 1   • gabapentin (NEURONTIN) 300 MG capsule Take 1 capsule by mouth 3 (Three) Times a Day. (Patient taking differently: Take 300 mg by mouth Daily.) 270 capsule 1   • sucralfate (CARAFATE) 1 g tablet Dissolve 1 tablet in 1 tablespoon warm water until it is a slurry and then drink. Do this QID, before meals and at bedtime. 360 tablet 1     No facility-administered medications prior to visit.        Allergies as of 05/08/2019 - Reviewed 05/08/2019   Allergen Reaction Noted   • Statins  10/20/2015   • Sulfa antibiotics  06/23/2014   • Oxycodone-acetaminophen Rash 11/28/2016     Social  "History     Socioeconomic History   • Marital status: Single     Spouse name: Not on file   • Number of children: Not on file   • Years of education: Not on file   • Highest education level: Not on file   Tobacco Use   • Smoking status: Never Smoker   • Smokeless tobacco: Never Used   Substance and Sexual Activity   • Alcohol use: No   • Drug use: Defer   • Sexual activity: Defer     Family History   Problem Relation Age of Onset   • Cancer Father    • Diabetes Father    • Hypertension Father    • Stroke Father    • Heart disease Father      Review of Systems   Constitution: Negative for fever, malaise/fatigue, weight gain and weight loss.   HENT: Negative for ear pain, hearing loss, nosebleeds and sore throat.    Eyes: Negative for double vision, pain, vision loss in left eye and vision loss in right eye.   Cardiovascular:        See history of present illness.   Respiratory: Negative for cough, shortness of breath, sleep disturbances due to breathing, snoring and wheezing.    Endocrine: Negative for cold intolerance, heat intolerance and polyuria.   Skin: Negative for itching, poor wound healing and rash.   Musculoskeletal: Positive for joint pain, joint swelling and myalgias.   Gastrointestinal: Negative for abdominal pain, diarrhea, hematochezia, nausea and vomiting.   Genitourinary: Negative for hematuria and hesitancy.   Neurological: Negative for numbness, paresthesias and seizures.   Psychiatric/Behavioral: Negative for depression. The patient is not nervous/anxious.         Objective:     Vitals:    05/08/19 1246   BP: 132/66   Pulse: 78   Weight: 83.5 kg (184 lb)   Height: 165.1 cm (65\")     Body mass index is 30.62 kg/m².    Physical Exam   Constitutional: She is oriented to person, place, and time. She appears well-developed and well-nourished.   Obese   HENT:   Head: Normocephalic.   Nose: Nose normal.   Mouth/Throat: Oropharynx is clear and moist.   Eyes: Conjunctivae and EOM are normal. Pupils are " equal, round, and reactive to light. Right eye exhibits no discharge. No scleral icterus.   Neck: Normal range of motion. Neck supple. No JVD present. No thyromegaly present.   Cardiovascular: Normal rate, regular rhythm, normal heart sounds and intact distal pulses. Exam reveals no gallop and no friction rub.   No murmur heard.  Pulses:       Carotid pulses are 2+ on the right side, and 2+ on the left side.       Radial pulses are 2+ on the right side, and 2+ on the left side.        Femoral pulses are 2+ on the right side, and 2+ on the left side.       Popliteal pulses are 2+ on the right side, and 2+ on the left side.        Dorsalis pedis pulses are 2+ on the right side, and 2+ on the left side.        Posterior tibial pulses are 2+ on the right side, and 2+ on the left side.   Pulmonary/Chest: Effort normal and breath sounds normal. No respiratory distress. She has no wheezes. She has no rales.   Abdominal: Soft. Bowel sounds are normal. She exhibits no distension. There is no hepatosplenomegaly. There is no tenderness. There is no rebound.   Musculoskeletal: Normal range of motion. She exhibits no edema or tenderness.   Neurological: She is alert and oriented to person, place, and time.   Skin: Skin is warm and dry. No rash noted. No erythema.   Psychiatric: She has a normal mood and affect. Her behavior is normal. Judgment and thought content normal.   Vitals reviewed.    Lab Review:     ECG 12 Lead  Date/Time: 5/8/2019 1:06 PM  Performed by: Kaylah Blancas MD  Authorized by: Kaylah Blancas MD   Comparison: compared with previous ECG   Similar to previous ECG  Rhythm: sinus rhythm  Conduction: non-specific intraventricular conduction delay  Other findings: non-specific ST-T wave changes    Clinical impression: abnormal EKG          Assessment:       Diagnosis Plan   1. Localized edema  ECG 12 Lead   2. Hypokalemia  Basic Metabolic Panel   3. MVP (mitral valve prolapse)     4. Benign essential hypertension        Plan:       1.  Mitral valve prolapse with mild mitral regurgitation.  2.  Hypertension.  Blood pressures lower at home  2.  Structurally normal cardiac valves  3.  Obstructive sleep apnea  4.  Dyslipidemia.  She has an upcomingappoiintlesny labs with Dr. Tripathi in June and will discuss further treatment options at that time         Your medication list           Accurate as of 5/8/19 11:59 PM. If you have any questions, ask your nurse or doctor.               CONTINUE taking these medications      Instructions Last Dose Given Next Dose Due   ascorbic acid 1000 MG tablet  Commonly known as:  VITAMIN C      Take 1,000 mg by mouth daily.       b complex-C-E-zinc tablet      Take 1 tablet by mouth Daily.       Cholecalciferol 1000 units capsule      Take  by mouth.       dexlansoprazole 60 MG capsule  Commonly known as:  DEXILANT      Take 1 capsule by mouth Every Morning Before Breakfast.       EPIPEN 0.3 MG/0.3ML solution auto-injector injection  Generic drug:  EPINEPHrine      0.3 mg once. Inject intramuscularly as directed       fluticasone 50 MCG/ACT nasal spray  Commonly known as:  FLONASE      SHAKE WELL AND USE 2 SPRAYS IN EACH NOSTRIL DAILY       folic acid 400 MCG tablet  Commonly known as:  FOLVITE      Take 400 mcg by mouth Daily.       GINGER ROOT PO      Take by mouth.       hydrochlorothiazide 12.5 MG capsule  Commonly known as:  MICROZIDE      TAKE 1 CAPSULE BY MOUTH DAILY       hyoscyamine 0.125 MG SL tablet  Commonly known as:  LEVSIN      Dissolve 1-2 tablets under the tongue every 4 hours as needed for abdominal pain       lisinopril 10 MG tablet  Commonly known as:  PRINIVIL,ZESTRIL      Take 1 tablet by mouth Daily.       multivitamin tablet      Take 1 tablet by mouth daily.       potassium chloride 20 MEQ CR tablet  Commonly known as:  K-DUR,KLOR-CON      Take 1 tablet by mouth Daily.       sucralfate 1 g tablet  Commonly known as:  CARAFATE      TAKE 1 TABLET BY MOUTH FOUR TIMES DAILY        Zinc Sulfate 66 MG tablet      Take  by mouth.          STOP taking these medications    gabapentin 300 MG capsule  Commonly known as:  NEURONTIN  Stopped by:  Kaylah Blancas MD              Where to Get Your Medications      These medications were sent to [id number] - [place], [place] - [address] AT [facility] [address] - [phone]  - [phone] FX  [address], [facility] [address]    Phone:  [phone]   · lisinopril 10 MG tablet  · potassium chloride 20 MEQ CR tablet         Patient is no longer taking -.  I corrected the med list to reflect this.  I did not stop these medications.    Dictated utilizing Dragon dictation

## 2019-05-09 PROBLEM — E87.6 HYPOKALEMIA: Status: ACTIVE | Noted: 2019-05-09

## 2019-05-09 PROBLEM — R60.0 LOCALIZED EDEMA: Status: ACTIVE | Noted: 2019-05-09

## 2019-05-16 ENCOUNTER — TELEPHONE (OUTPATIENT)
Dept: CARDIOLOGY | Facility: CLINIC | Age: 64
End: 2019-05-16

## 2019-05-20 RX ORDER — HYDROCHLOROTHIAZIDE 12.5 MG/1
12.5 CAPSULE, GELATIN COATED ORAL DAILY
Qty: 90 CAPSULE | Refills: 3 | Status: SHIPPED | OUTPATIENT
Start: 2019-05-20 | End: 2020-05-07 | Stop reason: SDUPTHER

## 2019-06-05 ENCOUNTER — HOSPITAL ENCOUNTER (OUTPATIENT)
Dept: SLEEP MEDICINE | Facility: HOSPITAL | Age: 64
Discharge: HOME | End: 2019-06-05
Attending: INTERNAL MEDICINE | Admitting: INTERNAL MEDICINE

## 2019-06-05 DIAGNOSIS — G47.33 OSA (OBSTRUCTIVE SLEEP APNEA): ICD-10-CM

## 2019-06-05 PROCEDURE — 95806 SLEEP STUDY UNATT&RESP EFFT: CPT

## 2019-06-14 RX ORDER — LISINOPRIL 10 MG/1
10 TABLET ORAL DAILY
Qty: 90 TABLET | Refills: 3 | Status: SHIPPED | OUTPATIENT
Start: 2019-06-14 | End: 2020-06-09

## 2019-06-17 ENCOUNTER — TELEPHONE (OUTPATIENT)
Dept: SLEEP MEDICINE | Facility: HOSPITAL | Age: 64
End: 2019-06-17

## 2019-06-26 DIAGNOSIS — R73.9 HYPERGLYCEMIA: Primary | ICD-10-CM

## 2019-06-26 DIAGNOSIS — E55.9 VITAMIN D DEFICIENCY: ICD-10-CM

## 2019-06-26 DIAGNOSIS — Z00.00 ANNUAL PHYSICAL EXAM: ICD-10-CM

## 2019-06-27 LAB
25(OH)D3+25(OH)D2 SERPL-MCNC: 90.3 NG/ML (ref 30–100)
ALBUMIN SERPL-MCNC: 4.5 G/DL (ref 3.5–5.2)
ALBUMIN/GLOB SERPL: 1.5 G/DL
ALP SERPL-CCNC: 103 U/L (ref 39–117)
ALT SERPL-CCNC: 15 U/L (ref 1–33)
AST SERPL-CCNC: 15 U/L (ref 1–32)
BILIRUB SERPL-MCNC: 0.4 MG/DL (ref 0.2–1.2)
BUN SERPL-MCNC: 14 MG/DL (ref 8–23)
BUN/CREAT SERPL: 15.2 (ref 7–25)
CALCIUM SERPL-MCNC: 10 MG/DL (ref 8.6–10.5)
CHLORIDE SERPL-SCNC: 103 MMOL/L (ref 98–107)
CHOLEST SERPL-MCNC: 275 MG/DL (ref 0–200)
CO2 SERPL-SCNC: 25.7 MMOL/L (ref 22–29)
CREAT SERPL-MCNC: 0.92 MG/DL (ref 0.57–1)
ERYTHROCYTE [DISTWIDTH] IN BLOOD BY AUTOMATED COUNT: 14 % (ref 12.3–15.4)
GLOBULIN SER CALC-MCNC: 3.1 GM/DL
GLUCOSE SERPL-MCNC: 97 MG/DL (ref 65–99)
HBA1C MFR BLD: 6.1 % (ref 4.8–5.6)
HCT VFR BLD AUTO: 42.1 % (ref 34–46.6)
HDLC SERPL-MCNC: 47 MG/DL (ref 40–60)
HGB BLD-MCNC: 13.9 G/DL (ref 12–15.9)
INTERPRETATION: NORMAL
LDLC SERPL CALC-MCNC: 194 MG/DL (ref 0–100)
LDLC/HDLC SERPL: 4.12 {RATIO}
MCH RBC QN AUTO: 30.1 PG (ref 26.6–33)
MCHC RBC AUTO-ENTMCNC: 33 G/DL (ref 31.5–35.7)
MCV RBC AUTO: 91.1 FL (ref 79–97)
PLATELET # BLD AUTO: 396 10*3/MM3 (ref 140–450)
POTASSIUM SERPL-SCNC: 4 MMOL/L (ref 3.5–5.2)
PROT SERPL-MCNC: 7.6 G/DL (ref 6–8.5)
RBC # BLD AUTO: 4.62 10*6/MM3 (ref 3.77–5.28)
SODIUM SERPL-SCNC: 144 MMOL/L (ref 136–145)
T4 FREE SERPL-MCNC: 1.07 NG/DL (ref 0.82–1.77)
THYROPEROXIDASE AB SERPL-ACNC: 12 IU/ML (ref 0–34)
TRIGL SERPL-MCNC: 171 MG/DL (ref 0–150)
TSH SERPL DL<=0.005 MIU/L-ACNC: 4.63 UIU/ML (ref 0.45–4.5)
VLDLC SERPL CALC-MCNC: 34.2 MG/DL
WBC # BLD AUTO: 8.48 10*3/MM3 (ref 3.4–10.8)

## 2019-07-03 ENCOUNTER — OFFICE VISIT (OUTPATIENT)
Dept: INTERNAL MEDICINE | Facility: CLINIC | Age: 64
End: 2019-07-03

## 2019-07-03 VITALS
HEIGHT: 65 IN | BODY MASS INDEX: 31.82 KG/M2 | HEART RATE: 80 BPM | TEMPERATURE: 98.3 F | WEIGHT: 191 LBS | DIASTOLIC BLOOD PRESSURE: 84 MMHG | OXYGEN SATURATION: 96 % | SYSTOLIC BLOOD PRESSURE: 138 MMHG

## 2019-07-03 DIAGNOSIS — Z00.00 PHYSICAL EXAM, ANNUAL: Primary | ICD-10-CM

## 2019-07-03 PROBLEM — S82.843A CLOSED BIMALLEOLAR FRACTURE: Status: ACTIVE | Noted: 2018-12-23

## 2019-07-03 PROCEDURE — 99396 PREV VISIT EST AGE 40-64: CPT | Performed by: FAMILY MEDICINE

## 2019-07-03 NOTE — PROGRESS NOTES
Subjective   Kelsey Nicholson is a 64 y.o. female.   Chief Complaint   Patient presents with   • Annual Exam       History of Present Illness     #1 CPE-complete physical exam without GYN evaluation.  Patient was diagnosed with sleep apnea.  She started using CPAP last week.  She uses it every night.  She is not used to the mask yet.  She is going to schedule follow-up with a sleep specialist.  Patient had right ankle surgery in December 2019.  She broke her ankle falling off the ladder.  It still bothers her.  She has pain and weakness in the right ankle.  She did physical therapy.  She saw her orthopedist Dr. Birmingham last week.  Family history-her paternal uncle was diagnosed with liver cancer.  There is no change in medications from last office visit.  No change in allergies to medication.  Exercise currently on hold because of ankle fracture.  Patient is just starting walking with a cane.  She is planning to go back to the gym as soon as possible.  She has dental appointments every 6 months.  Last eye exam was in 2018.  She wears glasses.    She had GYN office visit with her GYN Dr. Morales in 8/2018.  She had breast exam, pelvic and rectal exam and mammogram and it was normal.  She is scheduled for this year exam in August.  She had colonoscopy in April 2018.  Next is due 5 years later.  She had tetanus vaccine in 2013.  She had both Shingrix vaccines.  TSH is at 4.6, which is very mildly elevated, TPA normal at 12, free T4 at 1.07.  A1c at 6.1, which is in the range of prediabetes.  Fasting blood sugar 97.  .  Patient does not tolerate statins.    The following portions of the patient's history were reviewed and updated as appropriate: allergies, current medications, past family history, past medical history, past social history, past surgical history and problem list.    Review of Systems   Constitutional: Negative.    HENT: Negative.    Respiratory: Negative.    Cardiovascular: Negative.     Gastrointestinal: Negative.    Musculoskeletal: Positive for arthralgias and joint swelling.   Neurological: Negative.    Psychiatric/Behavioral: Negative.          Objective   Wt Readings from Last 3 Encounters:   07/03/19 86.6 kg (191 lb)   05/08/19 83.5 kg (184 lb)   12/21/18 84.4 kg (186 lb)      Vitals:    07/03/19 1356   BP: 138/84   Pulse: 80   Temp: 98.3 °F (36.8 °C)   SpO2: 96%     Temp Readings from Last 3 Encounters:   07/03/19 98.3 °F (36.8 °C) (Oral)   11/20/18 98.4 °F (36.9 °C) (Oral)   10/24/18 98 °F (36.7 °C)     BP Readings from Last 3 Encounters:   07/03/19 138/84   05/08/19 132/66   12/21/18 165/63     Pulse Readings from Last 3 Encounters:   07/03/19 80   05/08/19 78   12/21/18 76       Physical Exam   Constitutional: She is oriented to person, place, and time. She appears well-developed and well-nourished. No distress.   HENT:   Head: Normocephalic and atraumatic. Hair is normal.   Right Ear: Hearing, tympanic membrane, external ear and ear canal normal. No drainage. No decreased hearing is noted.   Left Ear: Hearing, tympanic membrane, external ear and ear canal normal. No decreased hearing is noted.   Nose: No nasal deformity.   Mouth/Throat: Oropharynx is clear and moist.   Eyes: Conjunctivae, EOM and lids are normal. Pupils are equal, round, and reactive to light. Right eye exhibits no discharge. Left eye exhibits no discharge.   Neck: Normal range of motion. Neck supple. No JVD present. No tracheal deviation present. No thyromegaly present.   Cardiovascular: Normal rate, regular rhythm, normal heart sounds, intact distal pulses and normal pulses. Exam reveals no gallop and no friction rub.   No murmur heard.  Pulmonary/Chest: Effort normal and breath sounds normal. No respiratory distress. She has no wheezes. She has no rales. She exhibits no tenderness.   Abdominal: Soft. She exhibits no distension and no mass. There is no tenderness. There is no rebound and no guarding. No hernia.    Musculoskeletal: Normal range of motion. She exhibits no edema, tenderness or deformity.   Right ankle deformity and swelling.   Lymphadenopathy:     She has no cervical adenopathy.   Neurological: She is alert and oriented to person, place, and time. She has normal reflexes. She displays normal reflexes. No cranial nerve deficit. She exhibits normal muscle tone. Coordination normal.   Skin: Skin is warm and dry. No rash noted. She is not diaphoretic. No erythema.   Psychiatric: She has a normal mood and affect. Her behavior is normal. Judgment and thought content normal.   Vitals reviewed.      Assessment/Plan   There are no diagnoses linked to this encounter.    #1 CPE-patient is up-to-date with vaccinations and cancer screening.  She will schedule eye exam.  She will start to exercise as soon as possible.  With a goal of 5 days a week for 30 minutes.  Thyroid test is mildly abnormal.  We will recheck it in 6 months.  A1c in the range of prediabetes.  Weight loss can decrease risk of developing diabetes.  She does not tolerate statins.  She will work on lifestyle changes to improve cholesterol.  She screened positive on 1 of the questions for depression screening.  She says that her mood is down a little recently because of the right ankle pain and surgery.  I am encouraging her to schedule follow-up with me if it does not go away in the next few weeks or sooner if it bothers her more.

## 2019-07-23 RX ORDER — POTASSIUM CHLORIDE 20 MEQ/1
20 TABLET, EXTENDED RELEASE ORAL DAILY
Qty: 90 TABLET | Refills: 1 | Status: SHIPPED | OUTPATIENT
Start: 2019-07-23 | End: 2020-01-20

## 2019-08-12 ENCOUNTER — OFFICE VISIT (OUTPATIENT)
Dept: SLEEP MEDICINE | Facility: HOSPITAL | Age: 64
End: 2019-08-12

## 2019-08-12 VITALS
BODY MASS INDEX: 32.12 KG/M2 | DIASTOLIC BLOOD PRESSURE: 73 MMHG | HEIGHT: 65 IN | WEIGHT: 192.8 LBS | SYSTOLIC BLOOD PRESSURE: 140 MMHG | HEART RATE: 67 BPM | OXYGEN SATURATION: 96 %

## 2019-08-12 DIAGNOSIS — E66.9 OBESITY (BMI 30-39.9): ICD-10-CM

## 2019-08-12 DIAGNOSIS — G47.33 OBSTRUCTIVE SLEEP APNEA: Primary | ICD-10-CM

## 2019-08-12 PROCEDURE — G0463 HOSPITAL OUTPT CLINIC VISIT: HCPCS

## 2019-08-12 NOTE — PROGRESS NOTES
[facility] SLEEP MEDICINE  [address] [facility]  3rd Floor  [facility] [address]  [phone]    PCP: Elizabeth Dimas MD    Reason for visit:  Sleep disorders: DERRICK    Kelsey is a 64 y.o.female who was seen in the [facility] today. She had hst and was setup with cpap. Here to review. She is using ffm, needs to adjust at night. She sleeps from 9p to 7a. She wakes up rested and denies EDS. She feels the mask leaks somewhat. She tends not to sleep on her back.  [place] Sleepiness Scale is 3. Caffeine 0 per day. Alcohol 0 per week.    Kelsey  reports that she has never smoked. She has never used smokeless tobacco.    Pertinent Positive Review of Systems of acid reflux, abdominal bloating  Rest of Review of Systems was negative as recorded in Sleep Questionnaire.    Patient  has a past medical history of Abdominal pain, Cough, Daytime somnolence, GERD (gastroesophageal reflux disease), Hyperlipidemia, Hyperplastic colon polyp, Hypertension, Irritable bowel syndrome, Mitral valve prolapse (2000), Osteoarthritis, Pain of left thumb, Pure hypercholesterolemia, and Snoring.     Current Medications:    Current Outpatient Medications:   •  Ascorbic Acid (VITAMIN C) 1000 MG tablet, Take 1,000 mg by mouth daily., Disp: , Rfl:   •  B Complex-C-E-Zn (B COMPLEX-C-E-ZINC) tablet, Take 1 tablet by mouth Daily., Disp: , Rfl:   •  Cholecalciferol 1000 UNITS capsule, Take  by mouth., Disp: , Rfl:   •  dexlansoprazole (DEXILANT) 60 MG capsule, Take 1 capsule by mouth Every Morning Before Breakfast., Disp: 90 capsule, Rfl: 2  •  EPINEPHrine (EPIPEN) 0.3 MG/0.3ML solution auto-injector, 0.3 mg once. Inject intramuscularly as directed, Disp: , Rfl:   •  fluticasone (FLONASE) 50 MCG/ACT nasal spray, SHAKE WELL AND USE 2 SPRAYS IN EACH NOSTRIL DAILY, Disp: 16 g, Rfl: 11  •  folic acid (FOLVITE) 400 MCG tablet, Take 400 mcg by mouth Daily., Disp: , Rfl:   •  Jenny, Zingiber officinalis, (JENNY ROOT PO), Take by  "mouth., Disp: , Rfl:   •  hydrochlorothiazide (MICROZIDE) 12.5 MG capsule, TAKE 1 CAPSULE BY MOUTH DAILY, Disp: 90 capsule, Rfl: 3  •  hyoscyamine (LEVSIN) 0.125 MG SL tablet, Dissolve 1-2 tablets under the tongue every 4 hours as needed for abdominal pain, Disp: 60 tablet, Rfl: 2  •  lisinopril (PRINIVIL,ZESTRIL) 10 MG tablet, TAKE 1 TABLET BY MOUTH DAILY, Disp: 90 tablet, Rfl: 3  •  Multiple Vitamin (MULTIVITAMIN) tablet, Take 1 tablet by mouth daily., Disp: , Rfl:   •  potassium chloride (K-DUR,KLOR-CON) 20 MEQ CR tablet, Take 1 tablet by mouth Daily., Disp: 90 tablet, Rfl: 1  •  potassium chloride (K-DUR,KLOR-CON) 20 MEQ CR tablet, TAKE 1 TABLET BY MOUTH DAILY, Disp: 90 tablet, Rfl: 1  •  sucralfate (CARAFATE) 1 g tablet, TAKE 1 TABLET BY MOUTH FOUR TIMES DAILY, Disp: 360 tablet, Rfl: 0  •  Zinc Sulfate 66 MG tablet, Take  by mouth., Disp: , Rfl:    also entered in Sleep Questionnaire         Vital Signs: /73 (BP Location: Left arm, Patient Position: Sitting)   Pulse 67   Ht 165.1 cm (65\")   Wt 87.5 kg (192 lb 12.8 oz)   SpO2 96%   BMI 32.08 kg/m²     Body mass index is 32.08 kg/m².       Tongue: normal      Dentition: good       Pharynx: Posterior pharyngeal pillars are wide   Mallampatti: III (soft and hard palate and base of uvula visible)        General: Alert. Cooperative. Well developed. No acute distress.             Head:  Normocephalic. Symmetrical. Atraumatic.              Nose: No septal deviation. No drainage.          Throat: No oral lesions. No thrush. Moist mucous membranes.    Chest Wall:  Normal shape. Symmetric expansion with respiration. No tenderness.             Neck:  Trachea midline.           Lungs:  Clear to auscultation bilaterally. No wheezes. No rhonchi. No rales. Respirations regular, even and unlabored.            Heart:  Regular rhythm and normal rate. Normal S1 and S2. No murmur.     Abdomen:  Soft, non-tender and non-distended. Normal bowel sounds. No " masses.  Extremities:  Moves all extremities well. No edema.    Psychiatric: Normal mood and affect.    Study:  · 6/6/19   The patient tolerated the home sleep testing with monitoring time of 500 minutes. The data obtained make this a technically adequate study. The apnea hypopneas index(AHI) was 15.1 per sleep hour.  The AHI during supine position was 19.3 per sleep hour. Mean heart rate of 60.8 BPM.  Snoring was noted 2.4% of sleep time. Lowest oxygen saturation during the study was 82%. Saturation below 89% was noted for 10.7 mins.     Testing:  · Compliance 98% with average usage 8 hours 15 minutes AHI 5.4 average pressure 12.7 auto CPAP between 5 and 15 cm.    DME Company: [employer]    Impression:  1. Obstructive sleep apnea    2. Obesity (BMI 30-39.9)        Plan:  Kelsey is compliant but wants smaller profile mask, she was advised to switch at the 90-day ramon. Change to auto 10-15 as average pressure is 12.7 on the download.  She is overall improved with use of the machine.    I reiterated the importance of effective treatment of obstructive sleep apnea with PAP machine.  Cardiovascular health risks of untreated sleep apnea were again reviewed.  Patient was asked to remain cautious if there is persistent hypersomnolence. The benefit of weight loss in reducing severity of obstructive sleep apnea was discussed.  Patient would benefit from adhering to a strict diet to achieve ideal BMI.     Change of PAP supplies regularly is important for effective use.  Change of cushion on the mask or plugs on nasal pillows along with disposable filters once every month and change of mask frame, tubing, headgear and Velcro straps every 6 months at the minimum was reiterated.    This patient is compliant with PAP machine and benefits from its use.  Apnea hypopneas index is corrected/improved.  Daytime hypersomnolence has resolved.     Patient will follow up in this clinic in 6 months  APRN    Thank you for allowing me to  participate in your patient's care.    Hang Mejia MD    Part of this note may be an electronic transcription/translation of spoken language to printed text using the Dragon Dictation System.

## 2019-12-18 ENCOUNTER — OFFICE VISIT (OUTPATIENT)
Dept: GASTROENTEROLOGY | Facility: CLINIC | Age: 64
End: 2019-12-18

## 2019-12-18 VITALS
TEMPERATURE: 97.7 F | BODY MASS INDEX: 32.15 KG/M2 | WEIGHT: 193 LBS | DIASTOLIC BLOOD PRESSURE: 80 MMHG | SYSTOLIC BLOOD PRESSURE: 134 MMHG | HEIGHT: 65 IN

## 2019-12-18 DIAGNOSIS — R10.10 PAIN OF UPPER ABDOMEN: Primary | ICD-10-CM

## 2019-12-18 DIAGNOSIS — K27.9 PUD (PEPTIC ULCER DISEASE): ICD-10-CM

## 2019-12-18 DIAGNOSIS — R11.0 NAUSEA: ICD-10-CM

## 2019-12-18 PROCEDURE — 99214 OFFICE O/P EST MOD 30 MIN: CPT | Performed by: NURSE PRACTITIONER

## 2019-12-18 RX ORDER — SUCRALFATE ORAL 1 G/10ML
1 SUSPENSION ORAL 4 TIMES DAILY
Qty: 420 ML | Refills: 3 | Status: SHIPPED | OUTPATIENT
Start: 2019-12-18 | End: 2019-12-23

## 2019-12-18 RX ORDER — ESTRADIOL 0.1 MG/G
CREAM VAGINAL
Refills: 1 | COMMUNITY
Start: 2019-10-24

## 2019-12-18 NOTE — PROGRESS NOTES
Chief Complaint   Patient presents with   • Abdominal Pain   • Nausea     HPI    Kelsey Nicholson is a  64 y.o. female here for a follow up visit for abdominal pain and nausea. This is an established patient of Dr. Quesada's, new to me.    This patient was last seen by Dr. Quesada in December 2018 at which time she underwent endoscopic evaluation for complaints of left upper quadrant pain.  She has a history of peptic ulcer disease and colon polyps.  She is status post cholecystectomy.  Pathology from EGD in 2018 showed possible celiac sprue but celiac panel was negative.    On visit today patient reports onset of abdominal pain and nausea approximately 2 weeks ago.  Her upper abdomen is tender to touch, pain is described as an ache that comes and goes.  She has been on Dexilant 60 mg once a day and twice daily dosing Carafate tablets for the past year.  No enticing event.  Patient does report a great deal of stress with her job.  Denies vomiting and dysphagia.  Her appetite waxes and wanes. She avoids alcohol and NSAIDs.    She is prone to diarrhea with certain dietary triggers.  She will have an episode of diarrhea about once a week otherwise her bowels are moving 1-2 times a day and formed.  Denies lower abdominal pain or rectal bleeding.    Data reviewed:    MRCP dated 12/31/2018 w/ tiny hepatic cyst.  EGD dated 12/5/2018 w/ acute gastritis, normal duodenum, normal esophagus.  C scope dated 4/25/2018 w/ diverticulosis and polyp.  Recall 5 years.  Past Medical History:   Diagnosis Date   • Abdominal pain    • Cough    • Daytime somnolence    • GERD (gastroesophageal reflux disease)    • Hyperlipidemia    • Hyperplastic colon polyp    • Hypertension    • Irritable bowel syndrome    • Mitral valve prolapse 2000   • Osteoarthritis    • Pain of left thumb    • Pure hypercholesterolemia    • Snoring        Past Surgical History:   Procedure Laterality Date   • ANKLE HARDWARE REMOVAL  04/01/2019    [facility]   • ANKLE  SURGERY Right    • BREAST BIOPSY     • CHOLECYSTECTOMY  12/2013   • COLONOSCOPY  10/16/2013    IH, Hyperplastic polyp    • COLONOSCOPY  04/25/2018    Diverticulosis in the sigmoid colon and in the descending colon, colon polyp   • ENDOSCOPY  10/16/2013    gastritis.     • HYSTERECTOMY     • OOPHORECTOMY     • ROTATOR CUFF REPAIR Left 11/2014   • UPPER GASTROINTESTINAL ENDOSCOPY  04/25/2018    Acute erosive gastritis   • URETHRAL SUSPENSION         Scheduled Meds:  Outpatient Encounter Medications as of 12/18/2019   Medication Sig Dispense Refill   • Ascorbic Acid (VITAMIN C) 1000 MG tablet Take 1,000 mg by mouth daily.     • B Complex-C-E-Zn (B COMPLEX-C-E-ZINC) tablet Take 1 tablet by mouth Daily.     • Cholecalciferol 1000 UNITS capsule Take  by mouth.     • dexlansoprazole (DEXILANT) 60 MG capsule Take 1 capsule by mouth Every Morning Before Breakfast. 90 capsule 2   • EPINEPHrine (EPIPEN) 0.3 MG/0.3ML solution auto-injector 0.3 mg once. Inject intramuscularly as directed     • estradiol (ESTRACE) 0.1 MG/GM vaginal cream PLACE 1 GRAM VAGINALLY QHS FOR 1 WEEK THEN TWICE WEEKLY  1   • fluticasone (FLONASE) 50 MCG/ACT nasal spray SHAKE WELL AND USE 2 SPRAYS IN EACH NOSTRIL DAILY 16 g 11   • folic acid (FOLVITE) 400 MCG tablet Take 400 mcg by mouth Daily.     • Ginger, Zingiber officinalis, (GINGER ROOT PO) Take by mouth.     • hydrochlorothiazide (MICROZIDE) 12.5 MG capsule TAKE 1 CAPSULE BY MOUTH DAILY 90 capsule 3   • hyoscyamine (LEVSIN) 0.125 MG SL tablet Dissolve 1-2 tablets under the tongue every 4 hours as needed for abdominal pain 60 tablet 2   • lisinopril (PRINIVIL,ZESTRIL) 10 MG tablet TAKE 1 TABLET BY MOUTH DAILY 90 tablet 3   • Multiple Vitamin (MULTIVITAMIN) tablet Take 1 tablet by mouth daily.     • potassium chloride (K-DUR,KLOR-CON) 20 MEQ CR tablet TAKE 1 TABLET BY MOUTH DAILY 90 tablet 1   • Zinc Sulfate 66 MG tablet Take  by mouth.     • [DISCONTINUED] sucralfate (CARAFATE) 1 g tablet TAKE 1  TABLET BY MOUTH FOUR TIMES DAILY 360 tablet 0   • sucralfate (CARAFATE) 1 GM/10ML suspension Take 10 mL by mouth 4 (Four) Times a Day. 420 mL 3   • [DISCONTINUED] potassium chloride (K-DUR,KLOR-CON) 20 MEQ CR tablet Take 1 tablet by mouth Daily. 90 tablet 1     No facility-administered encounter medications on file as of 12/18/2019.        Continuous Infusions:  No current facility-administered medications for this visit.     PRN Meds:.    Allergies   Allergen Reactions   • Statins    • Sulfa Antibiotics    • Oxycodone-Acetaminophen Rash       Social History     Socioeconomic History   • Marital status: Single     Spouse name: Not on file   • Number of children: Not on file   • Years of education: Not on file   • Highest education level: Not on file   Tobacco Use   • Smoking status: Never Smoker   • Smokeless tobacco: Never Used   Substance and Sexual Activity   • Alcohol use: No   • Drug use: Defer   • Sexual activity: Defer       Family History   Problem Relation Age of Onset   • Cancer Father    • Diabetes Father    • Hypertension Father    • Stroke Father    • Heart disease Father    • Liver cancer Paternal Uncle        Review of Systems   Constitutional: Negative for activity change, appetite change, fatigue, fever and unexpected weight change.   HENT: Negative for trouble swallowing.    Respiratory: Negative for apnea, cough, choking, chest tightness, shortness of breath and wheezing.    Cardiovascular: Negative for chest pain, palpitations and leg swelling.   Gastrointestinal: Positive for abdominal pain and nausea. Negative for abdominal distention, anal bleeding, blood in stool, constipation, diarrhea, rectal pain and vomiting.       Vitals:    12/18/19 1423   BP: 134/80   Temp: 97.7 °F (36.5 °C)       Physical Exam   Constitutional: She is oriented to person, place, and time. She appears well-developed and well-nourished.   Eyes: Pupils are equal, round, and reactive to light.   Cardiovascular: Normal  rate, regular rhythm and normal heart sounds.   Pulmonary/Chest: Effort normal and breath sounds normal. No respiratory distress. She has no wheezes.   Abdominal: Soft. Bowel sounds are normal. She exhibits no distension and no mass. There is tenderness. There is no guarding. No hernia.   Tenderness to palpation across the upper abdominal area from the right upper quadrant to the left upper quadrant.   Musculoskeletal: Normal range of motion.   Neurological: She is alert and oriented to person, place, and time.   Skin: Skin is warm and dry. Capillary refill takes less than 2 seconds.   Psychiatric: She has a normal mood and affect. Her behavior is normal.     Kelsey was seen today for abdominal pain and nausea.    Diagnoses and all orders for this visit:    Pain of upper abdomen  -     CBC & Differential  -     Comprehensive Metabolic Panel  -     Lipase  -     CT Abdomen Pelvis With Contrast; Future  -     Case Request; Standing  -     Case Request    Nausea  -     Case Request; Standing  -     Case Request    PUD (peptic ulcer disease)  Comments:  History of    Other orders  -     sucralfate (CARAFATE) 1 GM/10ML suspension; Take 10 mL by mouth 4 (Four) Times a Day.    Impression:    Pleasant 64-year-old female seen today in follow-up for complaints of pain of the upper abdomen and nausea onset approximately 2 weeks ago despite being on daily PPI therapy and twice daily dosing Carafate.  She does have a history of peptic ulcer disease and gastritis.  Moving forward recommend a CT abdomen and pelvis to rule out abnormalities that could be contributing to her pain.  Labs today with CBC, CMP, and lipase.    Continue Dexilant and change Carafate to suspension 4 times a day.  Arrange EGD with Dr. Quesada as well.    Further recommendations and follow-up pending the aforementioned work-up.

## 2019-12-19 LAB
ALBUMIN SERPL-MCNC: 4.4 G/DL (ref 3.5–5.2)
ALBUMIN/GLOB SERPL: 1.5 G/DL
ALP SERPL-CCNC: 101 U/L (ref 39–117)
ALT SERPL-CCNC: 21 U/L (ref 1–33)
AST SERPL-CCNC: 18 U/L (ref 1–32)
BASOPHILS # BLD AUTO: 0.05 10*3/MM3 (ref 0–0.2)
BASOPHILS NFR BLD AUTO: 0.4 % (ref 0–1.5)
BILIRUB SERPL-MCNC: 0.3 MG/DL (ref 0.2–1.2)
BUN SERPL-MCNC: 10 MG/DL (ref 8–23)
BUN/CREAT SERPL: 11.1 (ref 7–25)
CALCIUM SERPL-MCNC: 9.9 MG/DL (ref 8.6–10.5)
CHLORIDE SERPL-SCNC: 100 MMOL/L (ref 98–107)
CO2 SERPL-SCNC: 28 MMOL/L (ref 22–29)
CREAT SERPL-MCNC: 0.9 MG/DL (ref 0.57–1)
EOSINOPHIL # BLD AUTO: 0.2 10*3/MM3 (ref 0–0.4)
EOSINOPHIL NFR BLD AUTO: 1.7 % (ref 0.3–6.2)
ERYTHROCYTE [DISTWIDTH] IN BLOOD BY AUTOMATED COUNT: 12.9 % (ref 12.3–15.4)
GLOBULIN SER CALC-MCNC: 3 GM/DL
GLUCOSE SERPL-MCNC: 89 MG/DL (ref 65–99)
HCT VFR BLD AUTO: 37.1 % (ref 34–46.6)
HGB BLD-MCNC: 12.2 G/DL (ref 12–15.9)
IMM GRANULOCYTES # BLD AUTO: 0.03 10*3/MM3 (ref 0–0.05)
IMM GRANULOCYTES NFR BLD AUTO: 0.3 % (ref 0–0.5)
LIPASE SERPL-CCNC: 43 U/L (ref 13–60)
LYMPHOCYTES # BLD AUTO: 3.72 10*3/MM3 (ref 0.7–3.1)
LYMPHOCYTES NFR BLD AUTO: 31.7 % (ref 19.6–45.3)
MCH RBC QN AUTO: 29.5 PG (ref 26.6–33)
MCHC RBC AUTO-ENTMCNC: 32.9 G/DL (ref 31.5–35.7)
MCV RBC AUTO: 89.6 FL (ref 79–97)
MONOCYTES # BLD AUTO: 0.56 10*3/MM3 (ref 0.1–0.9)
MONOCYTES NFR BLD AUTO: 4.8 % (ref 5–12)
NEUTROPHILS # BLD AUTO: 7.16 10*3/MM3 (ref 1.7–7)
NEUTROPHILS NFR BLD AUTO: 61.1 % (ref 42.7–76)
NRBC BLD AUTO-RTO: 0 /100 WBC (ref 0–0.2)
PLATELET # BLD AUTO: 385 10*3/MM3 (ref 140–450)
POTASSIUM SERPL-SCNC: 4.2 MMOL/L (ref 3.5–5.2)
PROT SERPL-MCNC: 7.4 G/DL (ref 6–8.5)
RBC # BLD AUTO: 4.14 10*6/MM3 (ref 3.77–5.28)
SODIUM SERPL-SCNC: 143 MMOL/L (ref 136–145)
WBC # BLD AUTO: 11.72 10*3/MM3 (ref 3.4–10.8)

## 2019-12-19 RX ORDER — SUCRALFATE 1 G/1
TABLET ORAL
Qty: 42 TABLET | Refills: 3 | Status: SHIPPED | OUTPATIENT
Start: 2019-12-19 | End: 2019-12-23

## 2019-12-19 NOTE — TELEPHONE ENCOUNTER
Since this patient is yours with Dr. AVENDAÑO I will defer this prescription for Carafate to you. [email or web address]

## 2019-12-19 NOTE — PROGRESS NOTES
Please notify the patient that lab work shows elevated white blood cell count which could be a sign of an infection.  The rest of her lab work is totally normal.  If she having any trouble with painful urination, increased urinary frequency?  Still recommend she get a CAT scan and have her EGD with Dr. Quesada.  I fshe is having any urinary symptoms I would like for her to come by and have a UA with reflex to culture.

## 2019-12-23 RX ORDER — SUCRALFATE 1 G/1
TABLET ORAL
Qty: 120 TABLET | Refills: 5 | Status: SHIPPED | OUTPATIENT
Start: 2019-12-23 | End: 2021-05-10 | Stop reason: ALTCHOICE

## 2019-12-26 ENCOUNTER — HOSPITAL ENCOUNTER (OUTPATIENT)
Dept: CT IMAGING | Facility: HOSPITAL | Age: 64
Discharge: HOME | End: 2019-12-26
Admitting: NURSE PRACTITIONER

## 2019-12-26 DIAGNOSIS — R10.10 PAIN OF UPPER ABDOMEN: ICD-10-CM

## 2019-12-26 PROCEDURE — 0 DIATRIZOATE MEGLUMINE & SODIUM PER 1 ML: Performed by: NURSE PRACTITIONER

## 2019-12-26 PROCEDURE — 74177 CT ABD & PELVIS W/CONTRAST: CPT

## 2019-12-26 PROCEDURE — 25010000002 IOPAMIDOL 61 % SOLUTION: Performed by: NURSE PRACTITIONER

## 2019-12-26 PROCEDURE — 82565 ASSAY OF CREATININE: CPT

## 2019-12-26 RX ADMIN — DIATRIZOATE MEGLUMINE AND DIATRIZOATE SODIUM 30 ML: 660; 100 LIQUID ORAL; RECTAL at 07:35

## 2019-12-26 RX ADMIN — IOPAMIDOL 90 ML: 612 INJECTION, SOLUTION INTRAVENOUS at 08:46

## 2019-12-26 NOTE — PROGRESS NOTES
Please notify patient that CAT scan abdomen and pelvis demonstrates no acute abnormalities.  Her pancreas, spleen, kidneys, small and large bowel loops are normal.

## 2019-12-27 LAB — CREAT BLDA-MCNC: 0.9 MG/DL (ref 0.6–1.3)

## 2020-01-08 DIAGNOSIS — R73.9 HYPERGLYCEMIA: ICD-10-CM

## 2020-01-08 DIAGNOSIS — R94.6 ABNORMAL THYROID FUNCTION TEST: ICD-10-CM

## 2020-01-08 DIAGNOSIS — E78.2 MIXED HYPERLIPIDEMIA: Primary | ICD-10-CM

## 2020-01-09 LAB
ALBUMIN SERPL-MCNC: 4.4 G/DL (ref 3.5–5.2)
ALBUMIN/GLOB SERPL: 1.4 G/DL
ALP SERPL-CCNC: 100 U/L (ref 39–117)
ALT SERPL-CCNC: 20 U/L (ref 1–33)
AST SERPL-CCNC: 19 U/L (ref 1–32)
BILIRUB SERPL-MCNC: 0.3 MG/DL (ref 0.2–1.2)
BUN SERPL-MCNC: 13 MG/DL (ref 8–23)
BUN/CREAT SERPL: 11.9 (ref 7–25)
CALCIUM SERPL-MCNC: 9.4 MG/DL (ref 8.6–10.5)
CHLORIDE SERPL-SCNC: 100 MMOL/L (ref 98–107)
CHOLEST SERPL-MCNC: 272 MG/DL (ref 0–200)
CO2 SERPL-SCNC: 27.3 MMOL/L (ref 22–29)
CREAT SERPL-MCNC: 1.09 MG/DL (ref 0.57–1)
GLOBULIN SER CALC-MCNC: 3.1 GM/DL
GLUCOSE SERPL-MCNC: 103 MG/DL (ref 65–99)
HBA1C MFR BLD: 6 % (ref 4.8–5.6)
HDLC SERPL-MCNC: 46 MG/DL (ref 40–60)
INTERPRETATION: NORMAL
LDLC SERPL CALC-MCNC: 200 MG/DL (ref 0–100)
LDLC/HDLC SERPL: 4.36 {RATIO}
POTASSIUM SERPL-SCNC: 3.7 MMOL/L (ref 3.5–5.2)
PROT SERPL-MCNC: 7.5 G/DL (ref 6–8.5)
SODIUM SERPL-SCNC: 144 MMOL/L (ref 136–145)
T4 FREE SERPL-MCNC: 1.07 NG/DL (ref 0.82–1.77)
THYROPEROXIDASE AB SERPL-ACNC: 13 IU/ML (ref 0–34)
TRIGL SERPL-MCNC: 128 MG/DL (ref 0–150)
TSH SERPL DL<=0.005 MIU/L-ACNC: 5.11 UIU/ML (ref 0.45–4.5)
VLDLC SERPL CALC-MCNC: 25.6 MG/DL

## 2020-01-17 ENCOUNTER — RESULTS ENCOUNTER (OUTPATIENT)
Dept: INTERNAL MEDICINE | Facility: CLINIC | Age: 65
End: 2020-01-17

## 2020-01-17 ENCOUNTER — OFFICE VISIT (OUTPATIENT)
Dept: INTERNAL MEDICINE | Facility: CLINIC | Age: 65
End: 2020-01-17

## 2020-01-17 VITALS
HEART RATE: 69 BPM | SYSTOLIC BLOOD PRESSURE: 130 MMHG | BODY MASS INDEX: 32.79 KG/M2 | OXYGEN SATURATION: 96 % | TEMPERATURE: 98.2 F | HEIGHT: 65 IN | DIASTOLIC BLOOD PRESSURE: 84 MMHG | WEIGHT: 196.8 LBS

## 2020-01-17 DIAGNOSIS — E55.9 VITAMIN D DEFICIENCY: ICD-10-CM

## 2020-01-17 DIAGNOSIS — R73.01 IFG (IMPAIRED FASTING GLUCOSE): Primary | ICD-10-CM

## 2020-01-17 DIAGNOSIS — E78.2 MIXED HYPERLIPIDEMIA: ICD-10-CM

## 2020-01-17 DIAGNOSIS — R94.6 THYROID FUNCTION TEST ABNORMAL: ICD-10-CM

## 2020-01-17 PROBLEM — R73.9 HYPERGLYCEMIA: Status: RESOLVED | Noted: 2017-08-17 | Resolved: 2020-01-17

## 2020-01-17 PROCEDURE — 99214 OFFICE O/P EST MOD 30 MIN: CPT | Performed by: FAMILY MEDICINE

## 2020-01-17 RX ORDER — FLUOCINONIDE 0.5 MG/G
OINTMENT TOPICAL
Refills: 1 | COMMUNITY
Start: 2019-11-13 | End: 2021-05-10 | Stop reason: ALTCHOICE

## 2020-01-17 RX ORDER — EZETIMIBE 10 MG/1
10 TABLET ORAL DAILY
Qty: 30 TABLET | Refills: 5 | Status: SHIPPED | OUTPATIENT
Start: 2020-01-17 | End: 2020-05-07 | Stop reason: ALTCHOICE

## 2020-01-17 NOTE — PROGRESS NOTES
Subjective   Kelsey Nicholson is a 64 y.o. female.   Chief Complaint   Patient presents with   • Discuss labs     thyroid test abnormal   • Hyperglycemia   • Hyperlipidemia   • Vitamin D Deficiency       History of Present Illness     #1 IFG- fasting blood sugar 103, A1c at 6.0 from 6.1.  After last office visit patient improved her diet.  She eats less sweets, less bread, less soft drinks.  She eats less red meat, more fish and vegetables.  She joined weight watchers last week.  She tries to go to the gym twice a week.  She is under a lot of stress at work.  She is able to do everything, but is very tired of the stress.  PHQ 9 is at 5, LOIDA-7 at 8.    2.  Hyperlipidemia- LDL is 200, , patient does not tolerate statins.    3.  Vitamin D deficiency - she takes vitamin D3 at 1000 units a day.  She takes it every day.    4.  Abnormal thyroid test.  TSH is 5.1, free T4 is 1.07, TPA at 13.    The following portions of the patient's history were reviewed and updated as appropriate: allergies, current medications, past family history, past medical history, past social history, past surgical history and problem list.    Review of Systems      Objective   Wt Readings from Last 3 Encounters:   01/17/20 89.3 kg (196 lb 12.8 oz)   12/18/19 87.5 kg (193 lb)   08/12/19 87.5 kg (192 lb 12.8 oz)      Vitals:    01/17/20 1040   BP: 130/84   Pulse: 69   Temp: 98.2 °F (36.8 °C)   SpO2: 96%     Temp Readings from Last 3 Encounters:   01/17/20 98.2 °F (36.8 °C)   12/18/19 97.7 °F (36.5 °C)   07/03/19 98.3 °F (36.8 °C) (Oral)     BP Readings from Last 3 Encounters:   01/17/20 130/84   12/18/19 134/80   08/12/19 140/73     Pulse Readings from Last 3 Encounters:   01/17/20 69   08/12/19 67   07/03/19 80     Body mass index is 32.75 kg/m².    Physical Exam   Constitutional: She is oriented to person, place, and time. She appears well-developed and well-nourished.   Obese.   HENT:   Head: Normocephalic and atraumatic.   Neck: Neck  supple. Carotid bruit is not present. No thyromegaly present.   Cardiovascular: Normal rate, regular rhythm and normal heart sounds.   Pulmonary/Chest: Effort normal and breath sounds normal.   Neurological: She is alert and oriented to person, place, and time.   Skin: Skin is warm, dry and intact.   Psychiatric: She has a normal mood and affect. Her behavior is normal.       Assessment/Plan   Kelsey was seen today for discuss labs, hyperglycemia, hyperlipidemia and vitamin d deficiency.    Diagnoses and all orders for this visit:    IFG (impaired fasting glucose)  -     Hemoglobin A1c; Future  -     Comprehensive Metabolic Panel; Future  -     Lipid Panel With LDL / HDL Ratio; Future    Mixed hyperlipidemia  -     Comprehensive Metabolic Panel; Future  -     Lipid Panel With LDL / HDL Ratio; Future    Vitamin D deficiency  -     Vitamin D 25 Hydroxy; Future    Thyroid function test abnormal  -     Thyroid Cascade Profile; Future    Other orders  -     ezetimibe (ZETIA) 10 MG tablet; Take 1 tablet by mouth Daily.        #1 hyperlipidemia-patient does not tolerate statins.  We are starting Zetia 10 mg a day.  Follow-up in 6 months.  Labs before office visit.  2.  Impaired fasting glucose- increased risk for developing diabetes.  Weight loss can decrease it..  Patient joined weight watchers last week.  She will try to exercise 5 days a week.  Follow-up in 6 months.  Labs before office visit.  3.  Abnormal thyroid test-free hormones are normal.  Will recheck in 6 months.  4.  Vitamin D deficiency-check labs.  Follow-up in 6 months.    Increased stress - list of counselors providers and patient is advised to start counseling.

## 2020-01-20 RX ORDER — POTASSIUM CHLORIDE 20 MEQ/1
20 TABLET, EXTENDED RELEASE ORAL DAILY
Qty: 90 TABLET | Refills: 0 | Status: SHIPPED | OUTPATIENT
Start: 2020-01-20 | End: 2020-04-13

## 2020-01-28 ENCOUNTER — TELEPHONE (OUTPATIENT)
Dept: GASTROENTEROLOGY | Facility: CLINIC | Age: 65
End: 2020-01-28

## 2020-01-28 NOTE — TELEPHONE ENCOUNTER
----- Message from KRISTAL Powell sent at 12/19/2019  9:59 AM EST -----  Please notify the patient that lab work shows elevated white blood cell count which could be a sign of an infection.  The rest of her lab work is totally normal.  If she having any trouble with painful urination, increased urinary frequency?  Still r  ecommend she get a CAT scan and have her EGD with Dr. Quesada.  I fshe is having any urinary symptoms I would like for her to come by and have a UA with reflex to culture.

## 2020-01-28 NOTE — TELEPHONE ENCOUNTER
Notes recorded by Cheryl Mcnair APRN on 12/26/2019 at 2:16 PM EST  Please notify patient that CAT scan abdomen and pelvis demonstrates no acute abnormalities.  Her pancreas, spleen, kidneys, small and large bowel loops are normal.

## 2020-02-03 ENCOUNTER — HOSPITAL ENCOUNTER (OUTPATIENT)
Facility: HOSPITAL | Age: 65
Setting detail: HOSPITAL OUTPATIENT SURGERY
Discharge: HOME | End: 2020-02-03
Attending: INTERNAL MEDICINE | Admitting: INTERNAL MEDICINE

## 2020-02-03 ENCOUNTER — ANESTHESIA (OUTPATIENT)
Dept: GASTROENTEROLOGY | Facility: HOSPITAL | Age: 65
End: 2020-02-03

## 2020-02-03 ENCOUNTER — ANESTHESIA EVENT (OUTPATIENT)
Dept: GASTROENTEROLOGY | Facility: HOSPITAL | Age: 65
End: 2020-02-03

## 2020-02-03 VITALS
HEIGHT: 65 IN | HEART RATE: 56 BPM | OXYGEN SATURATION: 95 % | RESPIRATION RATE: 16 BRPM | BODY MASS INDEX: 32.32 KG/M2 | WEIGHT: 194 LBS | SYSTOLIC BLOOD PRESSURE: 145 MMHG | TEMPERATURE: 98 F | DIASTOLIC BLOOD PRESSURE: 79 MMHG

## 2020-02-03 DIAGNOSIS — R11.0 NAUSEA: ICD-10-CM

## 2020-02-03 DIAGNOSIS — R10.10 PAIN OF UPPER ABDOMEN: ICD-10-CM

## 2020-02-03 PROCEDURE — 88305 TISSUE EXAM BY PATHOLOGIST: CPT | Performed by: INTERNAL MEDICINE

## 2020-02-03 PROCEDURE — S0260 H&P FOR SURGERY: HCPCS | Performed by: INTERNAL MEDICINE

## 2020-02-03 PROCEDURE — 43239 EGD BIOPSY SINGLE/MULTIPLE: CPT | Performed by: INTERNAL MEDICINE

## 2020-02-03 PROCEDURE — 25010000002 PROPOFOL 10 MG/ML EMULSION: Performed by: ANESTHESIOLOGY

## 2020-02-03 RX ORDER — DEXLANSOPRAZOLE 60 MG/1
60 CAPSULE, DELAYED RELEASE ORAL 2 TIMES DAILY
Qty: 180 CAPSULE | Refills: 3 | Status: SHIPPED | OUTPATIENT
Start: 2020-02-03

## 2020-02-03 RX ORDER — PROPOFOL 10 MG/ML
VIAL (ML) INTRAVENOUS AS NEEDED
Status: DISCONTINUED | OUTPATIENT
Start: 2020-02-03 | End: 2020-02-03 | Stop reason: SURG

## 2020-02-03 RX ORDER — SODIUM CHLORIDE 0.9 % (FLUSH) 0.9 %
10 SYRINGE (ML) INJECTION AS NEEDED
Status: DISCONTINUED | OUTPATIENT
Start: 2020-02-03 | End: 2020-02-03 | Stop reason: HOSPADM

## 2020-02-03 RX ORDER — SODIUM CHLORIDE 0.9 % (FLUSH) 0.9 %
3 SYRINGE (ML) INJECTION EVERY 12 HOURS SCHEDULED
Status: DISCONTINUED | OUTPATIENT
Start: 2020-02-03 | End: 2020-02-03 | Stop reason: HOSPADM

## 2020-02-03 RX ORDER — SODIUM CHLORIDE, SODIUM LACTATE, POTASSIUM CHLORIDE, CALCIUM CHLORIDE 600; 310; 30; 20 MG/100ML; MG/100ML; MG/100ML; MG/100ML
30 INJECTION, SOLUTION INTRAVENOUS CONTINUOUS PRN
Status: DISCONTINUED | OUTPATIENT
Start: 2020-02-03 | End: 2020-02-03 | Stop reason: HOSPADM

## 2020-02-03 RX ORDER — LIDOCAINE HYDROCHLORIDE 20 MG/ML
INJECTION, SOLUTION INFILTRATION; PERINEURAL AS NEEDED
Status: DISCONTINUED | OUTPATIENT
Start: 2020-02-03 | End: 2020-02-03 | Stop reason: SURG

## 2020-02-03 RX ADMIN — SODIUM CHLORIDE, POTASSIUM CHLORIDE, SODIUM LACTATE AND CALCIUM CHLORIDE 30 ML/HR: 600; 310; 30; 20 INJECTION, SOLUTION INTRAVENOUS at 07:42

## 2020-02-03 RX ADMIN — PROPOFOL 170 MG: 10 INJECTION, EMULSION INTRAVENOUS at 08:42

## 2020-02-03 RX ADMIN — LIDOCAINE HYDROCHLORIDE 100 MG: 20 INJECTION, SOLUTION INFILTRATION; PERINEURAL at 08:42

## 2020-02-03 NOTE — ANESTHESIA PREPROCEDURE EVALUATION
Anesthesia Evaluation     Patient summary reviewed and Nursing notes reviewed   NPO Solid Status: > 8 hours  NPO Liquid Status: > 8 hours           Airway   Mallampati: II  TM distance: >3 FB  Neck ROM: full  no difficulty expected  Dental - normal exam     Pulmonary - normal exam   Cardiovascular - normal exam    (+) hypertension, valvular problems/murmurs, hyperlipidemia,       Neuro/Psych  (+) psychiatric history,     GI/Hepatic/Renal/Endo    (+)  GERD,      Musculoskeletal     Abdominal  - normal exam   Substance History      OB/GYN          Other   arthritis,                      Anesthesia Plan    ASA 3     MAC       Anesthetic plan, all risks, benefits, and alternatives have been provided, discussed and informed consent has been obtained with: patient.

## 2020-02-03 NOTE — ANESTHESIA POSTPROCEDURE EVALUATION
"Patient: Kelsey Nicholson    Procedure Summary     Date:  02/03/20 Room / Location:   [facility]    Anesthesia Start:  0839 Anesthesia Stop:  0858    Procedure:  ESOPHAGOGASTRODUODENOSCOPY WITH BIOPSIES (N/A Esophagus) Diagnosis:       Pain of upper abdomen      Nausea      (Pain of upper abdomen [R10.10])      (Nausea [R11.0])    Surgeon:  Jose Enrique Queasda MD Provider:  Cedric Garber MD    Anesthesia Type:  MAC ASA Status:  3          Anesthesia Type: MAC    Vitals  Vitals Value Taken Time   /82 2/3/2020  9:08 AM   Temp 36.7 °C (98 °F) 2/3/2020  8:58 AM   Pulse 54 2/3/2020  9:08 AM   Resp 16 2/3/2020  9:08 AM   SpO2 97 % 2/3/2020  9:08 AM           Post Anesthesia Care and Evaluation    Patient location during evaluation: bedside  Patient participation: complete - patient participated  Level of consciousness: awake and alert  Pain management: adequate  Airway patency: patent  Anesthetic complications: No anesthetic complications    Cardiovascular status: acceptable  Respiratory status: acceptable  Hydration status: acceptable    Comments: /82 (BP Location: Left arm, Patient Position: Sitting)   Pulse 54   Temp 36.7 °C (98 °F) (Oral)   Resp 16   Ht 165.1 cm (65\")   Wt 88 kg (194 lb)   SpO2 97%   BMI 32.28 kg/m²       "

## 2020-02-03 NOTE — DISCHARGE INSTRUCTIONS
For the next 24 hours patient needs to be with a responsible adult.    For 24 hours DO NOT drive, operate machinery, appliances, drink alcohol, make important decisions or sign legal documents.    Start with a light or bland diet if you are feeling sick to your stomach otherwise advance to regular diet as tolerated.    Follow recommendations on procedure report if provided by your doctor.    Call Dr Quesada for problems [phone]    Problems may include but not limited to: large amounts of bleeding, trouble breathing, repeated vomiting, severe unrelieved pain, fever or chills.

## 2020-02-03 NOTE — H&P
"[facility]  Pre Procedure History & Physical    Chief Complaint: luq pain    Subjective     HPI:   See above    Past Medical History:   Past Medical History:   Diagnosis Date   • Abdominal pain    • Cough    • Daytime somnolence    • GERD (gastroesophageal reflux disease)    • Hyperlipidemia    • Hyperplastic colon polyp    • Hypertension    • Irritable bowel syndrome    • Mitral valve prolapse 2000   • Osteoarthritis    • Pain of left thumb    • Pure hypercholesterolemia    • Snoring        Past Surgical History:  Past Surgical History:   Procedure Laterality Date   • ANKLE SURGERY Right    • BREAST BIOPSY     • CHOLECYSTECTOMY  12/2013   • COLONOSCOPY  10/16/2013    IH, Hyperplastic polyp    • COLONOSCOPY  04/25/2018    Diverticulosis in the sigmoid colon and in the descending colon, colon polyp   • ENDOSCOPY  10/16/2013    gastritis.     • HYSTERECTOMY     • OOPHORECTOMY     • ROTATOR CUFF REPAIR Left 11/2014   • UPPER GASTROINTESTINAL ENDOSCOPY  04/25/2018    Acute erosive gastritis   • URETHRAL SUSPENSION         Family History:  Family History   Problem Relation Age of Onset   • Cancer Father    • Diabetes Father    • Hypertension Father    • Stroke Father    • Heart disease Father    • Liver cancer Paternal Uncle        Social History:   reports that she has never smoked. She has never used smokeless tobacco. Drug use questions deferred to the physician. She reports that she does not drink alcohol.    Medications:   No medications prior to admission.       Allergies:  Statins; Oxycodone-acetaminophen; and Sulfa antibiotics    ROS:    Pertinent items are noted in HPI     Objective     Blood pressure 145/79, pulse 56, temperature 98 °F (36.7 °C), temperature source Oral, resp. rate 16, height 165.1 cm (65\"), weight 88 kg (194 lb), SpO2 95 %, not currently breastfeeding.    Physical Exam   Constitutional: Pt is oriented to person, place, and time and well-developed, well-nourished, and " in no distress.   Mouth/Throat: Oropharynx is clear and moist.   Neck: Normal range of motion.   Cardiovascular: Normal rate, regular rhythm and normal heart sounds.    Pulmonary/Chest: Effort normal and breath sounds normal.   Abdominal: Soft. Nontender  Skin: Skin is warm and dry.   Psychiatric: Mood, memory, affect and judgment normal.     Assessment/Plan     Diagnosis:  luq pain egd    Anticipated Surgical Procedure:  egd     The risks, benefits, and alternatives of this procedure have been discussed with the patient or the responsible party- the patient understands and agrees to proceed.

## 2020-02-04 LAB
CYTO UR: NORMAL
LAB AP CASE REPORT: NORMAL
PATH REPORT.FINAL DX SPEC: NORMAL
PATH REPORT.GROSS SPEC: NORMAL

## 2020-02-04 NOTE — PROGRESS NOTES
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

## 2020-02-10 ENCOUNTER — TELEPHONE (OUTPATIENT)
Dept: GASTROENTEROLOGY | Facility: CLINIC | Age: 65
End: 2020-02-10

## 2020-02-11 NOTE — TELEPHONE ENCOUNTER
Dexilant denied per "[employer] with following message:     ? Your request has been denied     The drug you asked for is non-formulary (not on [employer] list of preferred drugs). Before the drug can be covered, we need more information. Please ask your prescriber to explain to [employer] why the preferred drugs, omeprazole capsule,delayed release, lansoprazole capsule,delayed release, rabeprazole tablet,delayed release AND esomeprazole magnesium capsule,delayed release, have not worked for your medical condition and/or would have bad side effects. The list of drugs offered by your current [employer] contract can be found at www.[employer].com/druglist in the printable drug list. If you have not tried a preferred drug, please talk to your health care provider about prescribing one of these for you. Some of the preferred drugs may require an additional review and approval by [employer]. In addition, if approved, the maximum allowed amount is 30 capsules per 30 days. This determination was based on the "[employer] Non-Formulary Exceptions Coverage Policy.

## 2020-02-12 ENCOUNTER — OFFICE VISIT (OUTPATIENT)
Dept: SLEEP MEDICINE | Facility: HOSPITAL | Age: 65
End: 2020-02-12

## 2020-02-12 VITALS
HEIGHT: 65 IN | OXYGEN SATURATION: 94 % | HEART RATE: 71 BPM | BODY MASS INDEX: 32.55 KG/M2 | DIASTOLIC BLOOD PRESSURE: 69 MMHG | WEIGHT: 195.4 LBS | SYSTOLIC BLOOD PRESSURE: 138 MMHG

## 2020-02-12 DIAGNOSIS — G47.33 OBSTRUCTIVE SLEEP APNEA: Primary | ICD-10-CM

## 2020-02-12 DIAGNOSIS — E66.9 OBESITY (BMI 30-39.9): ICD-10-CM

## 2020-02-12 DIAGNOSIS — Z78.9 DIFFICULTY WITH CPAP FULL FACE MASK USE: ICD-10-CM

## 2020-02-12 PROCEDURE — G0463 HOSPITAL OUTPT CLINIC VISIT: HCPCS

## 2020-02-12 NOTE — TELEPHONE ENCOUNTER
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

## 2020-02-12 NOTE — TELEPHONE ENCOUNTER
We are going to have to reach out to the patient and see what she is tried and failed in the past.  Looks like they are wanting her to have tried and failed omeprazole, lansoprazole, rabeprazole, and as omeprazole.

## 2020-02-12 NOTE — TELEPHONE ENCOUNTER
Patient called. Advised her insurance denied coverage for her Dexilant BID. She states she gets her Dexilant through [employer] and she dropped off the paperwork today. Advised will complete the paperwork and fax it to [employer]. Advised of Dr. Quesada's note regarding her path results. She verb understanding.

## 2020-02-12 NOTE — PROGRESS NOTES
[facility]  Telephone: [phone] / Fax: [phone] [place]  Telephone: [phone] / Fax: [phone] Shalonda Ramos    PCP: Elizabeth Dimas MD    Reason for visit: DERRICK f/u    Kelsey Nicholson is a 64 y.o.female  was last seen at [facility] sleep lab in August. Her pressures were increased to auto CPAP 10-15cm as her AHI was 5.4. She feels that it helped. Sleep quality since then improved. She uses a FFM but feels that she struggles with it at times. She feels that sleep quality has improved in comparison to prior treatment, and excessive sleepiness/snoring is no longer an issue.  There is no complaint of aerophagia, excessive dry mouth or air leak. His sleep schedule is 9pm-6am. ESS is 3.    SH- no tobacco, no alcohol, drinks 2 cups of tea per day. No energy drinks.    ROS- +GERD, +bloating. Rest is negative.    [facility]    Current Medications:    Current Outpatient Medications:   •  Ascorbic Acid (VITAMIN C) 1000 MG tablet, Take 1,000 mg by mouth daily., Disp: , Rfl:   •  B Complex-C-E-Zn (B COMPLEX-C-E-ZINC) tablet, Take 1 tablet by mouth Daily., Disp: , Rfl:   •  Cholecalciferol 1000 UNITS capsule, Take  by mouth., Disp: , Rfl:   •  dexlansoprazole (DEXILANT) 60 MG capsule, Take 1 capsule by mouth 2 (Two) Times a Day., Disp: 180 capsule, Rfl: 3  •  EPINEPHrine (EPIPEN) 0.3 MG/0.3ML solution auto-injector, 0.3 mg once. Inject intramuscularly as directed, Disp: , Rfl:   •  estradiol (ESTRACE) 0.1 MG/GM vaginal cream, PLACE 1 GRAM VAGINALLY QHS FOR 1 WEEK THEN TWICE WEEKLY, Disp: , Rfl: 1  •  ezetimibe (ZETIA) 10 MG tablet, Take 1 tablet by mouth Daily., Disp: 30 tablet, Rfl: 5  •  fluocinonide (LIDEX) 0.05 % ointment, APPLY TO ANKLE AND LEG BID FOR 14 DAYS THEN AS NEEDED, Disp: , Rfl: 1  •  fluticasone (FLONASE) 50 MCG/ACT nasal spray, SHAKE WELL AND USE 2 SPRAYS IN EACH NOSTRIL DAILY, Disp: 16 g, Rfl: 11  •  folic acid (FOLVITE) 400 MCG tablet, Take 400 mcg by mouth Daily., Disp:  ", Rfl:   •  Ginger, Zingiber officinalis, (GINGER ROOT PO), Take by mouth., Disp: , Rfl:   •  hydrochlorothiazide (MICROZIDE) 12.5 MG capsule, TAKE 1 CAPSULE BY MOUTH DAILY, Disp: 90 capsule, Rfl: 3  •  hyoscyamine (LEVSIN) 0.125 MG SL tablet, Dissolve 1-2 tablets under the tongue every 4 hours as needed for abdominal pain, Disp: 60 tablet, Rfl: 2  •  lisinopril (PRINIVIL,ZESTRIL) 10 MG tablet, TAKE 1 TABLET BY MOUTH DAILY, Disp: 90 tablet, Rfl: 3  •  Multiple Vitamin (MULTIVITAMIN) tablet, Take 1 tablet by mouth daily., Disp: , Rfl:   •  potassium chloride (K-DUR,KLOR-CON) 20 MEQ CR tablet, TAKE 1 TABLET BY MOUTH DAILY, Disp: 90 tablet, Rfl: 0  •  sucralfate (CARAFATE) 1 g tablet, DISSOLVE ONE TABLET IN 15ML OF WARM WATER UNTILL IT DISSOLVES, THEN DRINK. DO THIS FOUR TIMES DAILY BEFORE MEALS AND AT BEDTIME, Disp: 120 tablet, Rfl: 5  •  Zinc Sulfate 66 MG tablet, Take  by mouth., Disp: , Rfl:    also entered in Sleep Questionnaire    Patient  has a past medical history of Abdominal pain, Cough, Daytime somnolence, GERD (gastroesophageal reflux disease), Hyperlipidemia, Hyperplastic colon polyp, Hypertension, Irritable bowel syndrome, Mitral valve prolapse (2000), Osteoarthritis, Pain of left thumb, Pure hypercholesterolemia, and Snoring.    I have reviewed the Past Medical History, Past Surgical History, Social History and Family History.    Vital Signs /69 (BP Location: Left arm, Patient Position: Sitting)   Pulse 71   Ht 165.1 cm (65\")   Wt 88.6 kg (195 lb 6.4 oz)   SpO2 94%   BMI 32.52 kg/m²  Body mass index is 32.52 kg/m².    General Alert and oriented. No acute distress noted   Pharynx/Throat Class IV Mallampati airway, large tongue, no evidence of redundant lateral pharyngeal tissue. No oral lesions. No thrush. Moist mucous membranes.   Head Normocephalic. Symmetrical. Atraumatic.    Nose No septal deviation. No drainage   Chest Wall Normal shape. Symmetric expansion with respiration. No tenderness. "   Neck Trachea midline, no thyromegaly or adenopathy    Lungs Clear to auscultation bilaterally. No wheezes. No rhonchi. No rales. Respirations regular, even and unlabored.   Heart Regular rhythm and normal rate. Normal S1 and S2. No murmur   Abdomen Soft, non-tender and non-distended. Normal bowel sounds. No masses.   Extremities Moves all extremities well. No edema   Psychiatric Normal mood and affect.     Testing:  · Download 1/12/20-2/10/20- 97% use with average nightly use of 9 hours and 11 minutes on auto CPAP 10-15cm with AHI 2.0, average pressure of 12.5.    Study:  · 6/6/19   The patient tolerated the home sleep testing with monitoring time of 500 minutes. The data obtained make this a technically adequate study. The apnea hypopneas index(AHI) was 15.1 per sleep hour.  The AHI during supine position was 19.3 per sleep hour. Mean heart rate of 60.8 BPM.  Snoring was noted 2.4% of sleep time. Lowest oxygen saturation during the study was 82%. Saturation below 89% was noted for 10.7 mins.     Impression:  1. Obstructive sleep apnea    2. Obesity (BMI 30-39.9)    3. Difficulty with CPAP full face mask use          Plan:  Patient uses the CPAP device and benefits from its use in terms of reduction of hypersomnia and snoring.  AHI appears to be within adequate range. I reviewed download report and original sleep study report with the patient. We looked at different masks today. She liked the Air Fit N20 and Air Fit P10.  I will ask our tech to do mask fitting today.    Weight loss will be strongly beneficial to reduce the severity of sleep-disordered breathing.  Caution during activities that require prolonged concentration is strongly advised if sleepiness returns. Changing of PAP supplies regularly is important for effective use.       Follow up with Dr. Mejia in 6 months.    Thank you for allowing me to participate in your patient's care.      KRISTAL Garcia  [facility]  Phone:  [phone]      Part of this note may be an electronic transcription/translation of spoken language to printed text using the Dragon Dictation System.

## 2020-03-06 ENCOUNTER — OFFICE VISIT (OUTPATIENT)
Dept: INTERNAL MEDICINE | Facility: CLINIC | Age: 65
End: 2020-03-06

## 2020-03-06 VITALS
HEIGHT: 65 IN | BODY MASS INDEX: 32.32 KG/M2 | WEIGHT: 194 LBS | OXYGEN SATURATION: 97 % | HEART RATE: 64 BPM | DIASTOLIC BLOOD PRESSURE: 86 MMHG | SYSTOLIC BLOOD PRESSURE: 152 MMHG

## 2020-03-06 DIAGNOSIS — H44.812 EYE HEMORRHAGE, LEFT: Primary | ICD-10-CM

## 2020-03-06 PROCEDURE — 99213 OFFICE O/P EST LOW 20 MIN: CPT | Performed by: NURSE PRACTITIONER

## 2020-03-06 NOTE — PROGRESS NOTES
Subjective   Kelsey Nicholson is a 64 y.o. female. Patient is here today for   Chief Complaint   Patient presents with   • Eye Problem     Pt complains of having a red, irritated left eye x1 day.    .    History of Present Illness   New problem to this provider: C/o redness to left eye since this morning associated with some discomfort. No changes in vision. She did have a slight headache, but that has improved.  Denies any drainage or discharge from her eyes.     The following portions of the patient's history were reviewed and updated as appropriate: allergies, current medications, past family history, past medical history, past social history, past surgical history and problem list.    Review of Systems   Constitutional: Negative.    Eyes: Positive for pain and redness. Negative for discharge, itching and visual disturbance.   Respiratory: Negative.    Cardiovascular: Negative.        Objective   Vitals:    03/06/20 1310   BP: 152/86   Pulse: 64   SpO2: 97%     Patient states that her BP is usually controlled on lisinopril and HCTZ. She is under some increased stress. Her last few office visits show controlled BP    Body mass index is 32.28 kg/m².  Physical Exam   Constitutional: Vital signs are normal. She appears well-developed and well-nourished.   Eyes: Pupils are equal, round, and reactive to light. Left conjunctiva has a hemorrhage.       Trace edema left upper lid   Cardiovascular: Normal rate, regular rhythm and normal heart sounds.   Pulmonary/Chest: Effort normal and breath sounds normal.   Skin: Skin is warm, dry and intact.   Psychiatric: She has a normal mood and affect. Her speech is normal and behavior is normal. Thought content normal.   Nursing note and vitals reviewed.      Assessment/Plan   Kelsey was seen today for eye problem.    Diagnoses and all orders for this visit:    Eye hemorrhage, left  -     Ambulatory Referral to Ophthalmology    Will try to get patient in to see ophthalmology  this afternoon.

## 2020-03-25 ENCOUNTER — TELEPHONE (OUTPATIENT)
Dept: GASTROENTEROLOGY | Facility: CLINIC | Age: 65
End: 2020-03-25

## 2020-03-27 ENCOUNTER — DOCUMENTATION (OUTPATIENT)
Dept: PHYSICAL THERAPY | Facility: CLINIC | Age: 65
End: 2020-03-27

## 2020-04-02 ENCOUNTER — TELEPHONE (OUTPATIENT)
Dept: GASTROENTEROLOGY | Facility: CLINIC | Age: 65
End: 2020-04-02

## 2020-04-02 NOTE — TELEPHONE ENCOUNTER
----- Message from Norman Arias sent at 4/2/2020 12:42 PM EDT -----  Regarding: dexlansoprazole (DEXILANT) 60   Contact: [phone]  [facility] called and needs a clarification on the prescription.

## 2020-04-13 RX ORDER — POTASSIUM CHLORIDE 20 MEQ/1
20 TABLET, EXTENDED RELEASE ORAL DAILY
Qty: 90 TABLET | Refills: 0 | Status: SHIPPED | OUTPATIENT
Start: 2020-04-13 | End: 2020-05-07 | Stop reason: SDUPTHER

## 2020-05-05 ENCOUNTER — TELEPHONE (OUTPATIENT)
Dept: CARDIOLOGY | Facility: CLINIC | Age: 65
End: 2020-05-05

## 2020-05-05 NOTE — TELEPHONE ENCOUNTER
Patient is supposed to have surgery on May 12 and has an appt to see Dr. Blancas on May 13.  Can you please see if this pt can come in to see Dr. Blancas sooner.  Kandy

## 2020-05-05 NOTE — TELEPHONE ENCOUNTER
Phone # [phone]    Patient called, informing me that she needs to have a plate and 7 screws taken out of her ankle.  This was cancelled due to COVID-19 and rescheduled for May 12.  Her surgery will be at [facility] by Dr. Trinh.  Patient said she will be under anesthesia.  Is patient cleared from a cardiac standpoint?  Patient is not on any blood thinners.  Patient said the surgery scheduler is Laney Curry #[phone])

## 2020-05-07 ENCOUNTER — OFFICE VISIT (OUTPATIENT)
Dept: CARDIOLOGY | Facility: CLINIC | Age: 65
End: 2020-05-07

## 2020-05-07 VITALS
BODY MASS INDEX: 32.49 KG/M2 | HEART RATE: 70 BPM | SYSTOLIC BLOOD PRESSURE: 112 MMHG | HEIGHT: 65 IN | DIASTOLIC BLOOD PRESSURE: 64 MMHG | WEIGHT: 195 LBS

## 2020-05-07 DIAGNOSIS — I10 BENIGN ESSENTIAL HYPERTENSION: Primary | ICD-10-CM

## 2020-05-07 DIAGNOSIS — I34.1 MVP (MITRAL VALVE PROLAPSE): ICD-10-CM

## 2020-05-07 DIAGNOSIS — G47.33 OSA (OBSTRUCTIVE SLEEP APNEA): ICD-10-CM

## 2020-05-07 PROCEDURE — 99214 OFFICE O/P EST MOD 30 MIN: CPT | Performed by: INTERNAL MEDICINE

## 2020-05-07 RX ORDER — POTASSIUM CHLORIDE 20 MEQ/1
20 TABLET, EXTENDED RELEASE ORAL DAILY
Qty: 90 TABLET | Refills: 3 | Status: SHIPPED | OUTPATIENT
Start: 2020-05-07 | End: 2020-07-16

## 2020-05-07 RX ORDER — HYDROCHLOROTHIAZIDE 12.5 MG/1
12.5 CAPSULE, GELATIN COATED ORAL DAILY
Qty: 90 CAPSULE | Refills: 3 | Status: SHIPPED | OUTPATIENT
Start: 2020-05-07 | End: 2020-05-15

## 2020-05-07 RX ORDER — POTASSIUM CHLORIDE 20 MEQ/1
20 TABLET, EXTENDED RELEASE ORAL DAILY
Qty: 90 TABLET | Refills: 1 | Status: SHIPPED | OUTPATIENT
Start: 2020-05-07 | End: 2020-05-07 | Stop reason: SDUPTHER

## 2020-05-07 RX ORDER — HYDROCHLOROTHIAZIDE 12.5 MG/1
12.5 CAPSULE, GELATIN COATED ORAL DAILY
Qty: 90 CAPSULE | Refills: 1 | Status: SHIPPED | OUTPATIENT
Start: 2020-05-07 | End: 2020-05-07 | Stop reason: SDUPTHER

## 2020-05-07 NOTE — PROGRESS NOTES
Date of Office Visit: 2020  Encounter Provider: Kaylah Blancas MD  Place of Service: [facility]  Patient Name: Kelsey Nicholson  :1955    Chief complaint  Follow-up of mitral valve prolapse and mitral regurgitation    History of Present Illness  Patient is a 65-year-old female with history of hyperlipidemia, hypertension, GE reflux.  She has a distant history of mitral valve prolapse with trivial mitral regurgitation noted previously by JAMES.  She had an echocardiogram May 2018 that showed normal systolic function grade 1 diastolic dysfunction structurally normal mitral valve with trivial mitral regurgitation.  She had a stress echocardiogram that was negative for ischemia.    Patient states that she fractured her ankle in 2018 and had plates and screws.  This had healed to the point where one screw was removed several months ago but she is had severe pain and now the plan is to remove all hardware from her right ankle next week.  She has been fairly limited in her activities due to ankle pain though is able to ambulate around her home she denies any chest pain, shortness of breath, palpitations, syncope near syncope.  She is using her CPAP religiously.  Her blood pressures been stable as it is today.  EKG performed yesterday at [facility] showed sinus rhythm normal EKG.  Blood work showed potassium 3.5 creatinine was normal.  Hemoglobin was normal.    Past Medical History:   Diagnosis Date   • Abdominal pain    • Cough    • Daytime somnolence    • GERD (gastroesophageal reflux disease)    • Hyperlipidemia    • Hyperplastic colon polyp    • Hypertension    • Irritable bowel syndrome    • Mitral valve prolapse    • Osteoarthritis    • Pain of left thumb    • PUD (peptic ulcer disease) 2020   • Pure hypercholesterolemia    • Snoring      Past Surgical History:   Procedure Laterality Date   • ANKLE SURGERY Right    • BREAST BIOPSY     •  CHOLECYSTECTOMY  12/2013   • COLONOSCOPY  10/16/2013    IH, Hyperplastic polyp    • COLONOSCOPY  04/25/2018    Diverticulosis in the sigmoid colon and in the descending colon, colon polyp   • ENDOSCOPY  10/16/2013    gastritis.     • ENDOSCOPY N/A 2/3/2020    Procedure: ESOPHAGOGASTRODUODENOSCOPY WITH BIOPSIES;  Surgeon: Jose Enrique Quesada MD;  Location: [facility] ENDOSCOPY;  Service: Gastroenterology   • HYSTERECTOMY     • OOPHORECTOMY     • ROTATOR CUFF REPAIR Left 11/2014   • UPPER GASTROINTESTINAL ENDOSCOPY  04/25/2018    Acute erosive gastritis   • URETHRAL SUSPENSION       Outpatient Medications Prior to Visit   Medication Sig Dispense Refill   • Ascorbic Acid (VITAMIN C) 1000 MG tablet Take 1,000 mg by mouth daily.     • B Complex-C-E-Zn (B COMPLEX-C-E-ZINC) tablet Take 1 tablet by mouth Daily.     • Cholecalciferol 1000 UNITS capsule Take  by mouth.     • dexlansoprazole (DEXILANT) 60 MG capsule Take 1 capsule by mouth 2 (Two) Times a Day. 180 capsule 3   • EPINEPHrine (EPIPEN) 0.3 MG/0.3ML solution auto-injector 0.3 mg once. Inject intramuscularly as directed     • estradiol (ESTRACE) 0.1 MG/GM vaginal cream PLACE 1 GRAM VAGINALLY QHS FOR 1 WEEK THEN TWICE WEEKLY  1   • fluocinonide (LIDEX) 0.05 % ointment APPLY TO ANKLE AND LEG BID FOR 14 DAYS THEN AS NEEDED  1   • fluticasone (FLONASE) 50 MCG/ACT nasal spray SHAKE WELL AND USE 2 SPRAYS IN EACH NOSTRIL DAILY 16 g 11   • folic acid (FOLVITE) 400 MCG tablet Take 400 mcg by mouth Daily.     • Ginger, Zingiber officinalis, (GINGER ROOT PO) Take by mouth.     • hyoscyamine (LEVSIN) 0.125 MG SL tablet DISSOLVE 1 TO 2 TABLETS UNDER THE TONGUE EVERY 4 HOURS AS NEEDED FOR ABDOMINAL PAIN 60 tablet 2   • lisinopril (PRINIVIL,ZESTRIL) 10 MG tablet TAKE 1 TABLET BY MOUTH DAILY 90 tablet 3   • Multiple Vitamin (MULTIVITAMIN) tablet Take 1 tablet by mouth daily.     • sucralfate (CARAFATE) 1 g tablet DISSOLVE ONE TABLET IN 15ML OF WARM WATER UNTILL IT DISSOLVES, THEN DRINK.  DO THIS FOUR TIMES DAILY BEFORE MEALS AND AT BEDTIME 120 tablet 5   • Zinc Sulfate 66 MG tablet Take  by mouth.     • hydrochlorothiazide (MICROZIDE) 12.5 MG capsule TAKE 1 CAPSULE BY MOUTH DAILY 90 capsule 3   • potassium chloride (K-DUR,KLOR-CON) 20 MEQ CR tablet TAKE 1 TABLET BY MOUTH DAILY 90 tablet 0   • ezetimibe (ZETIA) 10 MG tablet Take 1 tablet by mouth Daily. 30 tablet 5     No facility-administered medications prior to visit.        Allergies as of 05/07/2020 - Reviewed 05/07/2020   Allergen Reaction Noted   • Statins Other (See Comments) 10/20/2015   • Oxycodone-acetaminophen Rash 11/28/2016   • Sulfa antibiotics Rash 06/23/2014     Social History     Socioeconomic History   • Marital status: Single     Spouse name: Not on file   • Number of children: Not on file   • Years of education: Not on file   • Highest education level: Not on file   Tobacco Use   • Smoking status: Never Smoker   • Smokeless tobacco: Never Used   Substance and Sexual Activity   • Alcohol use: No   • Drug use: Defer   • Sexual activity: Defer     Family History   Problem Relation Age of Onset   • Cancer Father    • Diabetes Father    • Hypertension Father    • Stroke Father    • Heart disease Father    • Liver cancer Paternal Uncle      Review of Systems   Constitution: Negative for fever, malaise/fatigue, weight gain and weight loss.   HENT: Negative for ear pain, hearing loss, nosebleeds and sore throat.    Eyes: Negative for double vision, pain, vision loss in left eye and vision loss in right eye.   Cardiovascular:        See history of present illness.   Respiratory: Negative for cough, shortness of breath, sleep disturbances due to breathing, snoring and wheezing.    Endocrine: Negative for cold intolerance, heat intolerance and polyuria.   Skin: Negative for itching, poor wound healing and rash.   Musculoskeletal: Positive for joint pain. Negative for joint swelling and myalgias.   Gastrointestinal: Negative for abdominal  "pain, diarrhea, hematochezia, nausea and vomiting.   Genitourinary: Negative for hematuria and hesitancy.   Neurological: Negative for numbness, paresthesias and seizures.   Psychiatric/Behavioral: Negative for depression. The patient is not nervous/anxious.         Objective:     Vitals:    05/07/20 1333   BP: 112/64   Pulse: 70   Weight: 88.5 kg (195 lb)   Height: 165.1 cm (65\")     Body mass index is 32.45 kg/m².    Physical Exam   Constitutional: She is oriented to person, place, and time. She appears well-developed and well-nourished.   Obese   HENT:   Head: Normocephalic.   Nose: Nose normal.   Mouth/Throat: Oropharynx is clear and moist.   Eyes: Pupils are equal, round, and reactive to light. Conjunctivae and EOM are normal. Right eye exhibits no discharge. No scleral icterus.   Neck: Normal range of motion. Neck supple. No JVD present. No thyromegaly present.   Cardiovascular: Normal rate, regular rhythm, normal heart sounds and intact distal pulses. Exam reveals no gallop and no friction rub.   No murmur heard.  Pulses:       Carotid pulses are 2+ on the right side, and 2+ on the left side.       Radial pulses are 2+ on the right side, and 2+ on the left side.        Femoral pulses are 2+ on the right side, and 2+ on the left side.       Popliteal pulses are 2+ on the right side, and 2+ on the left side.        Dorsalis pedis pulses are 2+ on the right side, and 2+ on the left side.        Posterior tibial pulses are 2+ on the right side, and 2+ on the left side.   Pulmonary/Chest: Effort normal and breath sounds normal. No respiratory distress. She has no wheezes. She has no rales.   Abdominal: Soft. Bowel sounds are normal. She exhibits no distension. There is no hepatosplenomegaly. There is no tenderness. There is no rebound.   Musculoskeletal: Normal range of motion. She exhibits no edema or tenderness.   Neurological: She is alert and oriented to person, place, and time.   Skin: Skin is warm and dry. " No rash noted. No erythema.   Psychiatric: She has a normal mood and affect. Her behavior is normal. Judgment and thought content normal.   Vitals reviewed.    Lab Review:   Procedures  Assessment:       Diagnosis Plan   1. Benign essential hypertension     2. MVP (mitral valve prolapse)     3. DERRICK (obstructive sleep apnea)       Plan:       1.  Preop clearance for removal of hardware from right ankle.  Clinically stable with no anginal or heart failure symptoms.  She is cleared for surgery next week.  2.  Mitral valve prolapse (noted by JAMES) with mild mitral regurgitation.  Clinically stable  2.  Hypertension.  Controlled  3.  Obstructive sleep apnea. On CPAP nad followed by sleep medicine  4.  Dyslipidemia. Intolerant of Zetia and statins and stopped in March she will discuss with Dr. Tripathi as well as her brother who works with [employer] possible use of [employer].         Your medication list           Accurate as of May 7, 2020 11:59 PM. If you have any questions, ask your nurse or doctor.               CONTINUE taking these medications      Instructions Last Dose Given Next Dose Due   ascorbic acid 1000 MG tablet  Commonly known as:  VITAMIN C      Take 1,000 mg by mouth daily.       b complex-C-E-zinc tablet      Take 1 tablet by mouth Daily.       Cholecalciferol 25 MCG (1000 UT) capsule      Take  by mouth.       dexlansoprazole 60 MG capsule  Commonly known as:  DEXILANT      Take 1 capsule by mouth 2 (Two) Times a Day.       EpiPen 0.3 MG/0.3ML solution auto-injector injection  Generic drug:  EPINEPHrine      0.3 mg once. Inject intramuscularly as directed       estradiol 0.1 MG/GM vaginal cream  Commonly known as:  ESTRACE      PLACE 1 GRAM VAGINALLY QHS FOR 1 WEEK THEN TWICE WEEKLY       fluocinonide 0.05 % ointment  Commonly known as:  LIDEX      APPLY TO ANKLE AND LEG BID FOR 14 DAYS THEN AS NEEDED       fluticasone 50 MCG/ACT nasal spray  Commonly known as:  FLONASE      SHAKE WELL AND USE 2 SPRAYS IN  EACH NOSTRIL DAILY       folic acid 400 MCG tablet  Commonly known as:  FOLVITE      Take 400 mcg by mouth Daily.       GINGER ROOT PO      Take by mouth.       hydroCHLOROthiazide 12.5 MG capsule  Commonly known as:  MICROZIDE      Take 1 capsule by mouth Daily.       hyoscyamine 0.125 MG SL tablet  Commonly known as:  LEVSIN      DISSOLVE 1 TO 2 TABLETS UNDER THE TONGUE EVERY 4 HOURS AS NEEDED FOR ABDOMINAL PAIN       lisinopril 10 MG tablet  Commonly known as:  PRINIVIL,ZESTRIL      TAKE 1 TABLET BY MOUTH DAILY       multivitamin tablet      Take 1 tablet by mouth daily.       potassium chloride 20 MEQ CR tablet  Commonly known as:  K-DUR,KLOR-CON      Take 1 tablet by mouth Daily.       sucralfate 1 g tablet  Commonly known as:  CARAFATE      DISSOLVE ONE TABLET IN 15ML OF WARM WATER UNTILL IT DISSOLVES, THEN DRINK. DO THIS FOUR TIMES DAILY BEFORE MEALS AND AT BEDTIME       Zinc Sulfate 66 MG tablet      Take  by mouth.             Where to Get Your Medications      These medications were sent to [id number] - [place], [place] - [address] AT [facility] [address] - [phone]  - [phone]   [address], [facility] [address]    Phone:  [phone]   · hydroCHLOROthiazide 12.5 MG capsule  · potassium chloride 20 MEQ CR tablet         Patient is no longer taking -.  I corrected the med list to reflect this.  I did not stop these medications.    Dictated utilizing Dragon dictation

## 2020-05-10 PROBLEM — G47.33 OSA (OBSTRUCTIVE SLEEP APNEA): Status: ACTIVE | Noted: 2020-05-10

## 2020-05-15 RX ORDER — HYDROCHLOROTHIAZIDE 12.5 MG/1
12.5 CAPSULE, GELATIN COATED ORAL DAILY
Qty: 90 CAPSULE | Refills: 3 | Status: SHIPPED | OUTPATIENT
Start: 2020-05-15 | End: 2020-11-16 | Stop reason: SDUPTHER

## 2020-06-09 RX ORDER — LISINOPRIL 10 MG/1
10 TABLET ORAL DAILY
Qty: 90 TABLET | Refills: 1 | Status: SHIPPED | OUTPATIENT
Start: 2020-06-09 | End: 2020-12-10 | Stop reason: SDUPTHER

## 2020-06-12 RX ORDER — SUCRALFATE 1 G/1
TABLET ORAL
Qty: 360 TABLET | OUTPATIENT
Start: 2020-06-12

## 2020-06-30 DIAGNOSIS — R73.01 IFG (IMPAIRED FASTING GLUCOSE): ICD-10-CM

## 2020-06-30 DIAGNOSIS — E78.2 MIXED HYPERLIPIDEMIA: ICD-10-CM

## 2020-06-30 DIAGNOSIS — E55.9 VITAMIN D DEFICIENCY: ICD-10-CM

## 2020-07-02 LAB
25(OH)D3+25(OH)D2 SERPL-MCNC: 60.9 NG/ML (ref 30–100)
ALBUMIN SERPL-MCNC: 4.4 G/DL (ref 3.5–5.2)
ALBUMIN/GLOB SERPL: 1.5 G/DL
ALP SERPL-CCNC: 97 U/L (ref 39–117)
ALT SERPL-CCNC: 21 U/L (ref 1–33)
AST SERPL-CCNC: 17 U/L (ref 1–32)
BILIRUB SERPL-MCNC: 0.4 MG/DL (ref 0.2–1.2)
BUN SERPL-MCNC: 12 MG/DL (ref 8–23)
BUN/CREAT SERPL: 12.1 (ref 7–25)
CALCIUM SERPL-MCNC: 9.4 MG/DL (ref 8.6–10.5)
CHLORIDE SERPL-SCNC: 101 MMOL/L (ref 98–107)
CHOLEST SERPL-MCNC: 285 MG/DL (ref 0–200)
CO2 SERPL-SCNC: 28.8 MMOL/L (ref 22–29)
CREAT SERPL-MCNC: 0.99 MG/DL (ref 0.57–1)
GLOBULIN SER CALC-MCNC: 3 GM/DL
GLUCOSE SERPL-MCNC: 94 MG/DL (ref 65–99)
HBA1C MFR BLD: 6.1 % (ref 4.8–5.6)
HDLC SERPL-MCNC: 47 MG/DL (ref 40–60)
LDLC SERPL CALC-MCNC: 207 MG/DL (ref 0–100)
LDLC/HDLC SERPL: 4.4 {RATIO}
POTASSIUM SERPL-SCNC: 3.9 MMOL/L (ref 3.5–5.2)
PROT SERPL-MCNC: 7.4 G/DL (ref 6–8.5)
SODIUM SERPL-SCNC: 142 MMOL/L (ref 136–145)
TRIGL SERPL-MCNC: 155 MG/DL (ref 0–150)
VLDLC SERPL CALC-MCNC: 31 MG/DL

## 2020-07-08 ENCOUNTER — OFFICE VISIT (OUTPATIENT)
Dept: INTERNAL MEDICINE | Facility: CLINIC | Age: 65
End: 2020-07-08

## 2020-07-08 VITALS
SYSTOLIC BLOOD PRESSURE: 135 MMHG | TEMPERATURE: 96.8 F | OXYGEN SATURATION: 96 % | DIASTOLIC BLOOD PRESSURE: 80 MMHG | BODY MASS INDEX: 33.82 KG/M2 | WEIGHT: 203 LBS | HEART RATE: 74 BPM | HEIGHT: 65 IN

## 2020-07-08 DIAGNOSIS — R94.6 ABNORMAL THYROID FUNCTION TEST: ICD-10-CM

## 2020-07-08 DIAGNOSIS — R73.01 IFG (IMPAIRED FASTING GLUCOSE): ICD-10-CM

## 2020-07-08 DIAGNOSIS — Z00.00 PHYSICAL EXAM, ANNUAL: Primary | ICD-10-CM

## 2020-07-08 DIAGNOSIS — E78.2 MIXED HYPERLIPIDEMIA: ICD-10-CM

## 2020-07-08 PROCEDURE — 99396 PREV VISIT EST AGE 40-64: CPT | Performed by: FAMILY MEDICINE

## 2020-07-08 NOTE — PROGRESS NOTES
Subjective   Kelsey Nicholson is a 65 y.o. female.   Chief Complaint   Patient presents with   • Annual Exam   • Hyperglycemia   • Abnormal thyroid test   • Hyperlipidemia       History of Present Illness     Patient is here for complete physical exam without GYN evaluation.  She has no complaints.  She also follows-up on prediabetes, abnormal thyroid test and hyperlipidemia.    #1 CPE-patient has no complaints.  There is no change in medical history from last office visit.  In May she had hardware removed from right ankle.  She is on Keflex for another 2 weeks due to infection around the incision.  There is no change in family history from last office visit.  Over-the-counter she takes multivitamin, vitamin C, vitamin D, B complex and she started CBD oil per her rheumatologist recommendations.  No new allergies to medications.  She does not use tobacco products.  She does not take alcohol.  She does not use drugs.  She exercises about 3 times a week for 15 to 20 minutes of stretching and arms exercise.  No cardio exercise.  Last dental appointment was about a year ago.  Last eye exam was about a year ago.  She saw her GYN last summer and is scheduled next month.  She is going to have mammogram done at their office.  She had colonoscopy in April 2018.  Next is due 5 years later.  She had tetanus vaccine in 2013.  She had both Shingrix vaccines.    A1c at 6.1  Fasting blood sugar 94.  .  Patient does not tolerate statins.  At last office visit we started Zetia, but she had to stop it due to muscle cramps.  Abnormal thyroid test-in January 2020 TSH was at 5.1 with normal free hormones.    The following portions of the patient's history were reviewed and updated as appropriate: allergies, current medications, past family history, past medical history, past social history, past surgical history and problem list.    Review of Systems   Constitutional: Negative.  Negative for chills and fever.   Respiratory:  Negative.    Cardiovascular: Negative.    Psychiatric/Behavioral: Negative.          Objective   Wt Readings from Last 3 Encounters:   07/08/20 92.1 kg (203 lb)   05/07/20 88.5 kg (195 lb)   03/06/20 88 kg (194 lb)      Vitals:    07/08/20 1307   BP: 135/80   Pulse: 74   Temp: 96.8 °F (36 °C)   SpO2: 96%     Temp Readings from Last 3 Encounters:   07/08/20 96.8 °F (36 °C)   02/03/20 98 °F (36.7 °C) (Oral)   01/17/20 98.2 °F (36.8 °C)     BP Readings from Last 3 Encounters:   07/08/20 135/80   05/07/20 112/64   03/06/20 152/86     Pulse Readings from Last 3 Encounters:   07/08/20 74   05/07/20 70   03/06/20 64     Body mass index is 33.78 kg/m².    Physical Exam   Constitutional: She is oriented to person, place, and time. She appears well-developed and well-nourished. No distress.   Obese.   HENT:   Head: Normocephalic and atraumatic. Hair is normal.   Right Ear: Hearing, tympanic membrane, external ear and ear canal normal. No drainage. No decreased hearing is noted.   Left Ear: Hearing, tympanic membrane, external ear and ear canal normal. No decreased hearing is noted.   Nose: No nasal deformity.   Mouth/Throat: Oropharynx is clear and moist.   Eyes: Pupils are equal, round, and reactive to light. Conjunctivae, EOM and lids are normal. Right eye exhibits no discharge. Left eye exhibits no discharge.   Neck: Normal range of motion. Neck supple. No JVD present. No tracheal deviation present. No thyromegaly present.   Cardiovascular: Normal rate, regular rhythm, normal heart sounds, intact distal pulses and normal pulses. Exam reveals no gallop and no friction rub.   No murmur heard.  Pulmonary/Chest: Effort normal and breath sounds normal. No respiratory distress. She has no wheezes. She has no rales. She exhibits no tenderness.   Abdominal: Soft. She exhibits no distension and no mass. There is no tenderness. There is no rebound and no guarding. No hernia.   Musculoskeletal: Normal range of motion. She exhibits no  edema, tenderness or deformity.   Lymphadenopathy:     She has no cervical adenopathy.   Neurological: She is alert and oriented to person, place, and time. She has normal reflexes. She displays normal reflexes. No cranial nerve deficit. She exhibits normal muscle tone. Coordination normal.   Skin: Skin is warm and dry. No rash noted. She is not diaphoretic. No erythema.   Psychiatric: She has a normal mood and affect. Her behavior is normal. Judgment and thought content normal.   Vitals reviewed.      Assessment/Plan   Kelsey was seen today for annual exam, hyperglycemia, abnormal thyroid test and hyperlipidemia.    Diagnoses and all orders for this visit:    Physical exam, annual    Abnormal thyroid function test  -     Thyroid Cascade Profile    IFG (impaired fasting glucose)    Mixed hyperlipidemia        1 CPE-preventing counseling provided.  Patient is advised to start cardio exercise.  She should reach at least 30 minutes a day, 5 days a week.  She is advised to join weight watchers again.  She is going to reschedule her dentist appointment which was canceled due to pandemic.  She is due for eye exam and is advised to schedule it.  Discussed with patient that safety of the CBD oil is not known.  She is up-to-date with vaccinations.  She is scheduled for mammogram with her GYN next month.    2 abnormal thyroid test-we will check levels today.  3.  Hyperlipidemia-patient does not tolerate statins, she does not tolerate Zetia.  She will work on lifestyle changes to improve it.  4.  Impaired fasting glucose-information on prediabetic diet provided.  Follow-up in 6 months.

## 2020-07-08 NOTE — PATIENT INSTRUCTIONS
Exercising to Lose Weight  Exercise is structured, repetitive physical activity to improve fitness and health. Getting regular exercise is important for everyone. It is especially important if you are overweight. Being overweight increases your risk of heart disease, stroke, diabetes, high blood pressure, and several types of cancer. Reducing your calorie intake and exercising can help you lose weight.  Exercise is usually categorized as moderate or vigorous intensity. To lose weight, most people need to do a certain amount of moderate-intensity or vigorous-intensity exercise each week.  Moderate-intensity exercise    Moderate-intensity exercise is any activity that gets you moving enough to burn at least three times more energy (calories) than if you were sitting.  Examples of moderate exercise include:  · Walking a mile in 15 minutes.  · Doing light yard work.  · Biking at an easy pace.  Most people should get at least 150 minutes (2 hours and 30 minutes) a week of moderate-intensity exercise to maintain their body weight.  Vigorous-intensity exercise  Vigorous-intensity exercise is any activity that gets you moving enough to burn at least six times more calories than if you were sitting. When you exercise at this intensity, you should be working hard enough that you are not able to carry on a conversation.  Examples of vigorous exercise include:  · Running.  · Playing a team sport, such as football, basketball, and soccer.  · Jumping rope.  Most people should get at least 75 minutes (1 hour and 15 minutes) a week of vigorous-intensity exercise to maintain their body weight.  How can exercise affect me?  When you exercise enough to burn more calories than you eat, you lose weight. Exercise also reduces body fat and builds muscle. The more muscle you have, the more calories you burn. Exercise also:  · Improves mood.  · Reduces stress and tension.  · Improves your overall fitness, flexibility, and  endurance.  · Increases bone strength.  The amount of exercise you need to lose weight depends on:  · Your age.  · The type of exercise.  · Any health conditions you have.  · Your overall physical ability.  Talk to your health care provider about how much exercise you need and what types of activities are safe for you.  What actions can I take to lose weight?  Nutrition    · Make changes to your diet as told by your health care provider or diet and nutrition specialist (dietitian). This may include:  ? Eating fewer calories.  ? Eating more protein.  ? Eating less unhealthy fats.  ? Eating a diet that includes fresh fruits and vegetables, whole grains, low-fat dairy products, and lean protein.  ? Avoiding foods with added fat, salt, and sugar.  · Drink plenty of water while you exercise to prevent dehydration or heat stroke.  Activity  · Choose an activity that you enjoy and set realistic goals. Your health care provider can help you make an exercise plan that works for you.  · Exercise at a moderate or vigorous intensity most days of the week.  ? The intensity of exercise may vary from person to person. You can tell how intense a workout is for you by paying attention to your breathing and heartbeat. Most people will notice their breathing and heartbeat get faster with more intense exercise.  · Do resistance training twice each week, such as:  ? Push-ups.  ? Sit-ups.  ? Lifting weights.  ? Using resistance bands.  · Getting short amounts of exercise can be just as helpful as long structured periods of exercise. If you have trouble finding time to exercise, try to include exercise in your daily routine.  ? Get up, stretch, and walk around every 30 minutes throughout the day.  ? Go for a walk during your lunch break.  ? Park your car farther away from your destination.  ? If you take public transportation, get off one stop early and walk the rest of the way.  ? Make phone calls while standing up and walking  around.  ? Take the stairs instead of elevators or escalators.  · Wear comfortable clothes and shoes with good support.  · Do not exercise so much that you hurt yourself, feel dizzy, or get very short of breath.  Where to find more information  · [place] Department of Health and Human Services: www.hhs.gov  · Centers for Disease Control and Prevention (CDC): www.cdc.gov  Contact a health care provider:  · Before starting a new exercise program.  · If you have questions or concerns about your weight.  · If you have a medical problem that keeps you from exercising.  Get help right away if you have any of the following while exercising:  · Injury.  · Dizziness.  · Difficulty breathing or shortness of breath that does not go away when you stop exercising.  · Chest pain.  · Rapid heartbeat.  Summary  · Being overweight increases your risk of heart disease, stroke, diabetes, high blood pressure, and several types of cancer.  · Losing weight happens when you burn more calories than you eat.  · Reducing the amount of calories you eat in addition to getting regular moderate or vigorous exercise each week helps you lose weight.  This information is not intended to replace advice given to you by your health care provider. Make sure you discuss any questions you have with your health care provider.  Document Released: 01/20/2012 Document Revised: 12/31/2018 Document Reviewed: 12/31/2018  [employer] Patient Education © 2020 [employer]  Calorie Counting for Weight Loss  Calories are units of energy. Your body needs a certain amount of calories from food to keep you going throughout the day. When you eat more calories than your body needs, your body stores the extra calories as fat. When you eat fewer calories than your body needs, your body burns fat to get the energy it needs.  Calorie counting means keeping track of how many calories you eat and drink each day. Calorie counting can be helpful if you need to lose weight. If you  make sure to eat fewer calories than your body needs, you should lose weight. Ask your health care provider what a healthy weight is for you.  For calorie counting to work, you will need to eat the right number of calories in a day in order to lose a healthy amount of weight per week. A dietitian can help you determine how many calories you need in a day and will give you suggestions on how to reach your calorie goal.  · A healthy amount of weight to lose per week is usually 1-2 lb (0.5-0.9 kg). This usually means that your daily calorie intake should be reduced by 500-750 calories.  · Eating 1,200 - 1,500 calories per day can help most women lose weight.  · Eating 1,500 - 1,800 calories per day can help most men lose weight.  What is my plan?  My goal is to have __________ calories per day.  If I have this many calories per day, I should lose around __________ pounds per week.  What do I need to know about calorie counting?  In order to meet your daily calorie goal, you will need to:  · Find out how many calories are in each food you would like to eat. Try to do this before you eat.  · Decide how much of the food you plan to eat.  · Write down what you ate and how many calories it had. Doing this is called keeping a food log.  To successfully lose weight, it is important to balance calorie counting with a healthy lifestyle that includes regular activity. Aim for 150 minutes of moderate exercise (such as walking) or 75 minutes of vigorous exercise (such as running) each week.  Where do I find calorie information?    The number of calories in a food can be found on a Nutrition Facts label. If a food does not have a Nutrition Facts label, try to look up the calories online or ask your dietitian for help.  Remember that calories are listed per serving. If you choose to have more than one serving of a food, you will have to multiply the calories per serving by the amount of servings you plan to eat. For example, the  "label on a package of bread might say that a serving size is 1 slice and that there are 90 calories in a serving. If you eat 1 slice, you will have eaten 90 calories. If you eat 2 slices, you will have eaten 180 calories.  How do I keep a food log?  Immediately after each meal, record the following information in your food log:  · What you ate. Don't forget to include toppings, sauces, and other extras on the food.  · How much you ate. This can be measured in cups, ounces, or number of items.  · How many calories each food and drink had.  · The total number of calories in the meal.  Keep your food log near you, such as in a small notebook in your pocket, or use a mobile katy or website. Some programs will calculate calories for you and show you how many calories you have left for the day to meet your goal.  What are some calorie counting tips?    · Use your calories on foods and drinks that will fill you up and not leave you hungry:  ? Some examples of foods that fill you up are nuts and nut butters, vegetables, lean proteins, and high-fiber foods like whole grains. High-fiber foods are foods with more than 5 g fiber per serving.  ? Drinks such as sodas, specialty coffee drinks, alcohol, and juices have a lot of calories, yet do not fill you up.  · Eat nutritious foods and avoid empty calories. Empty calories are calories you get from foods or beverages that do not have many vitamins or protein, such as candy, sweets, and soda. It is better to have a nutritious high-calorie food (such as an avocado) than a food with few nutrients (such as a bag of chips).  · Know how many calories are in the foods you eat most often. This will help you calculate calorie counts faster.  · Pay attention to calories in drinks. Low-calorie drinks include water and unsweetened drinks.  · Pay attention to nutrition labels for \"low fat\" or \"fat free\" foods. These foods sometimes have the same amount of calories or more calories than the " full fat versions. They also often have added sugar, starch, or salt, to make up for flavor that was removed with the fat.  · Find a way of tracking calories that works for you. Get creative. Try different apps or programs if writing down calories does not work for you.  What are some portion control tips?  · Know how many calories are in a serving. This will help you know how many servings of a certain food you can have.  · Use a measuring cup to measure serving sizes. You could also try weighing out portions on a kitchen scale. With time, you will be able to estimate serving sizes for some foods.  · Take some time to put servings of different foods on your favorite plates, bowls, and cups so you know what a serving looks like.  · Try not to eat straight from a bag or box. Doing this can lead to overeating. Put the amount you would like to eat in a cup or on a plate to make sure you are eating the right portion.  · Use smaller plates, glasses, and bowls to prevent overeating.  · Try not to multitask (for example, watch TV or use your computer) while eating. If it is time to eat, sit down at a table and enjoy your food. This will help you to know when you are full. It will also help you to be aware of what you are eating and how much you are eating.  What are tips for following this plan?  Reading food labels  · Check the calorie count compared to the serving size. The serving size may be smaller than what you are used to eating.  · Check the source of the calories. Make sure the food you are eating is high in vitamins and protein and low in saturated and trans fats.  Shopping  · Read nutrition labels while you shop. This will help you make healthy decisions before you decide to purchase your food.  · Make a grocery list and stick to it.  Cooking  · Try to cook your favorite foods in a healthier way. For example, try baking instead of frying.  · Use low-fat dairy products.  Meal planning  · Use more fruits and  "vegetables. Half of your plate should be fruits and vegetables.  · Include lean proteins like poultry and fish.  How do I count calories when eating out?  · Ask for smaller portion sizes.  · Consider sharing an entree and sides instead of getting your own entree.  · If you get your own entree, eat only half. Ask for a box at the beginning of your meal and put the rest of your entree in it so you are not tempted to eat it.  · If calories are listed on the menu, choose the lower calorie options.  · Choose dishes that include vegetables, fruits, whole grains, low-fat dairy products, and lean protein.  · Choose items that are boiled, broiled, grilled, or steamed. Stay away from items that are buttered, battered, fried, or served with cream sauce. Items labeled \"crispy\" are usually fried, unless stated otherwise.  · Choose water, low-fat milk, unsweetened iced tea, or other drinks without added sugar. If you want an alcoholic beverage, choose a lower calorie option such as a glass of wine or light beer.  · Ask for dressings, sauces, and syrups on the side. These are usually high in calories, so you should limit the amount you eat.  · If you want a salad, choose a garden salad and ask for grilled meats. Avoid extra toppings like watt, cheese, or fried items. Ask for the dressing on the side, or ask for olive oil and vinegar or lemon to use as dressing.  · Estimate how many servings of a food you are given. For example, a serving of cooked rice is ½ cup or about the size of half a baseball. Knowing serving sizes will help you be aware of how much food you are eating at restaurants. The list below tells you how big or small some common portion sizes are based on everyday objects:  ? 1 oz--4 stacked dice.  ? 3 oz--1 deck of cards.  ? 1 tsp--1 die.  ? 1 Tbsp--½ a ping-pong ball.  ? 2 Tbsp--1 ping-pong ball.  ? ½ cup--½ baseball.  ? 1 cup--1 baseball.  Summary  · Calorie counting means keeping track of how many calories you " eat and drink each day. If you eat fewer calories than your body needs, you should lose weight.  · A healthy amount of weight to lose per week is usually 1-2 lb (0.5-0.9 kg). This usually means reducing your daily calorie intake by 500-750 calories.  · The number of calories in a food can be found on a Nutrition Facts label. If a food does not have a Nutrition Facts label, try to look up the calories online or ask your dietitian for help.  · Use your calories on foods and drinks that will fill you up, and not on foods and drinks that will leave you hungry.  · Use smaller plates, glasses, and bowls to prevent overeating.  This information is not intended to replace advice given to you by your health care provider. Make sure you discuss any questions you have with your health care provider.  Document Released: 12/18/2006 Document Revised: 09/06/2019 Document Reviewed: 11/17/2017  [employer] Patient Education © 2020 [employer]  Prediabetes Eating Plan  Prediabetes is a condition that causes blood sugar (glucose) levels to be higher than normal. This increases the risk for developing diabetes. In order to prevent diabetes from developing, your health care provider may recommend a diet and other lifestyle changes to help you:  · Control your blood glucose levels.  · Improve your cholesterol levels.  · Manage your blood pressure.  Your health care provider may recommend working with a diet and nutrition specialist (dietitian) to make a meal plan that is best for you.  What are tips for following this plan?  Lifestyle  · Set weight loss goals with the help of your health care team. It is recommended that most people with prediabetes lose 7% of their current body weight.  · Exercise for at least 30 minutes at least 5 days a week.  · Attend a support group or seek ongoing support from a mental health counselor.  · Take over-the-counter and prescription medicines only as told by your health care provider.  Reading food  labels  · Read food labels to check the amount of fat, salt (sodium), and sugar in prepackaged foods. Avoid foods that have:  ? Saturated fats.  ? Trans fats.  ? Added sugars.  · Avoid foods that have more than 300 milligrams (mg) of sodium per serving. Limit your daily sodium intake to less than 2,300 mg each day.  Shopping  · Avoid buying pre-made and processed foods.  Cooking  · Cook with olive oil. Do not use butter, lard, or ghee.  · Bake, broil, grill, or boil foods. Avoid frying.  Meal planning    · Work with your dietitian to develop an eating plan that is right for you. This may include:  ? Tracking how many calories you take in. Use a food diary, notebook, or mobile application to track what you eat at each meal.  ? Using the glycemic index (GI) to plan your meals. The index tells you how quickly a food will raise your blood glucose. Choose low-GI foods. These foods take a longer time to raise blood glucose.  · Consider following a Mediterranean diet. This diet includes:  ? Several servings each day of fresh fruits and vegetables.  ? Eating fish at least twice a week.  ? Several servings each day of whole grains, beans, nuts, and seeds.  ? Using olive oil instead of other fats.  ? Moderate alcohol consumption.  ? Eating small amounts of red meat and whole-fat dairy.  · If you have high blood pressure, you may need to limit your sodium intake or follow a diet such as the DASH eating plan. DASH is an eating plan that aims to lower high blood pressure.  What foods are recommended?  The items listed below may not be a complete list. Talk with your dietitian about what dietary choices are best for you.  Grains  Whole grains, such as whole-wheat or whole-grain breads, crackers, cereals, and pasta. Unsweetened oatmeal. Bulgur. Barley. Quinoa. Brown rice. Corn or whole-wheat flour tortillas or taco shells.  Vegetables  Lettuce. Spinach. Peas. Beets. Cauliflower. Cabbage. Broccoli. Carrots. Tomatoes. Squash.  Eggplant. Herbs. Peppers. Onions. Cucumbers. [place] sprouts.  Fruits  Berries. Bananas. Apples. Oranges. Grapes. Papaya. Rich. Pomegranate. Kiwi. Grapefruit. Cherries.  Meats and other protein foods  Seafood. Poultry without skin. Lean cuts of pork and beef. Tofu. Eggs. Nuts. Beans.  Dairy  Low-fat or fat-free dairy products, such as yogurt, cottage cheese, and cheese.  Beverages  Water. Tea. Coffee. Sugar-free or diet soda. [place] water. Lowfat or no-fat milk. Milk alternatives, such as soy or almond milk.  Fats and oils  Olive oil. Canola oil. Sunflower oil. Grapeseed oil. Avocado. Walnuts.  Sweets and desserts  Sugar-free or low-fat pudding. Sugar-free or low-fat ice cream and other frozen treats.  Seasoning and other foods  Herbs. Sodium-free spices. Mustard. Relish. Low-fat, low-sugar ketchup. Low-fat, low-sugar barbecue sauce. Low-fat or fat-free mayonnaise.  What foods are not recommended?  The items listed below may not be a complete list. Talk with your dietitian about what dietary choices are best for you.  Grains  Refined white flour and flour products, such as bread, pasta, snack foods, and cereals.  Vegetables  Canned vegetables. Frozen vegetables with butter or cream sauce.  Fruits  Fruits canned with syrup.  Meats and other protein foods  Fatty cuts of meat. Poultry with skin. Breaded or fried meat. Processed meats.  Dairy  Full-fat yogurt, cheese, or milk.  Beverages  Sweetened drinks, such as sweet iced tea and soda.  Fats and oils  Butter. Lard. Ghee.  Sweets and desserts  Baked goods, such as cake, cupcakes, pastries, cookies, and cheesecake.  Seasoning and other foods  Spice mixes with added salt. Ketchup. Barbecue sauce. Mayonnaise.  Summary  · To prevent diabetes from developing, you may need to make diet and other lifestyle changes to help control blood sugar, improve cholesterol levels, and manage your blood pressure.  · Set weight loss goals with the help of your health care team. It is  recommended that most people with prediabetes lose 7 percent of their current body weight.  · Consider following a Mediterranean diet that includes plenty of fresh fruits and vegetables, whole grains, beans, nuts, seeds, fish, lean meat, low-fat dairy, and healthy oils.  This information is not intended to replace advice given to you by your health care provider. Make sure you discuss any questions you have with your health care provider.  Document Released: 05/03/2016 Document Revised: 04/10/2020 Document Reviewed: 02/21/2018  [employer] Patient Education © 2020 [employer]

## 2020-07-09 LAB — TSH SERPL DL<=0.005 MIU/L-ACNC: 2.55 UIU/ML (ref 0.45–4.5)

## 2020-07-16 RX ORDER — POTASSIUM CHLORIDE 20 MEQ/1
20 TABLET, EXTENDED RELEASE ORAL DAILY
Qty: 90 TABLET | Refills: 1 | Status: SHIPPED | OUTPATIENT
Start: 2020-07-16 | End: 2021-01-18 | Stop reason: SDUPTHER

## 2020-08-07 ENCOUNTER — OFFICE VISIT (OUTPATIENT)
Dept: SLEEP MEDICINE | Facility: HOSPITAL | Age: 65
End: 2020-08-07

## 2020-08-07 VITALS
OXYGEN SATURATION: 97 % | HEIGHT: 65 IN | BODY MASS INDEX: 33.69 KG/M2 | WEIGHT: 202.2 LBS | SYSTOLIC BLOOD PRESSURE: 184 MMHG | DIASTOLIC BLOOD PRESSURE: 73 MMHG | HEART RATE: 85 BPM

## 2020-08-07 DIAGNOSIS — G47.33 OBSTRUCTIVE SLEEP APNEA: Primary | ICD-10-CM

## 2020-08-07 DIAGNOSIS — E66.9 OBESITY (BMI 30-39.9): ICD-10-CM

## 2020-08-07 PROCEDURE — G0463 HOSPITAL OUTPT CLINIC VISIT: HCPCS

## 2020-08-07 NOTE — PROGRESS NOTES
[facility] SLEEP MEDICINE  [address] [facility]  [address] [facility] [address]  [phone]    PCP: Elizabeth Dimas MD    Reason for visit:  Sleep disorders: DERRICK    Kelsey is a 65 y.o.female who was seen in the [facility] Disorders Center today. She is doing well with her CPAP. She sleeps from 8:45pm to 6:30am. She uses ffm, fits well, some air leaks, no dry mouth. She wakes up rested and denies EDS. She changes her mask regularly.  Danforth Sleepiness Scale is 2. Caffeine 1 per day. Alcohol - per week.    Kelsey  reports that she has never smoked. She has never used smokeless tobacco.    Pertinent Positive Review of Systems of acid reflux, abdominal bloating  Rest of Review of Systems was negative as recorded in Sleep Questionnaire.    Patient  has a past medical history of Abdominal pain, Cough, Daytime somnolence, GERD (gastroesophageal reflux disease), Hyperlipidemia, Hyperplastic colon polyp, Hypertension, Irritable bowel syndrome, Mitral valve prolapse (2000), Osteoarthritis, Pain of left thumb, PUD (peptic ulcer disease) (02/23/2020), Pure hypercholesterolemia, and Snoring.     Current Medications:    Current Outpatient Medications:   •  Ascorbic Acid (VITAMIN C) 1000 MG tablet, Take 1,000 mg by mouth daily., Disp: , Rfl:   •  B Complex-C-E-Zn (B COMPLEX-C-E-ZINC) tablet, Take 1 tablet by mouth Daily., Disp: , Rfl:   •  Cholecalciferol 1000 UNITS capsule, Take  by mouth., Disp: , Rfl:   •  dexlansoprazole (DEXILANT) 60 MG capsule, Take 1 capsule by mouth 2 (Two) Times a Day., Disp: 180 capsule, Rfl: 3  •  EPINEPHrine (EPIPEN) 0.3 MG/0.3ML solution auto-injector, 0.3 mg once. Inject intramuscularly as directed, Disp: , Rfl:   •  estradiol (ESTRACE) 0.1 MG/GM vaginal cream, PLACE 1 GRAM VAGINALLY QHS FOR 1 WEEK THEN TWICE WEEKLY, Disp: , Rfl: 1  •  fluocinonide (LIDEX) 0.05 % ointment, APPLY TO ANKLE AND LEG BID FOR 14 DAYS THEN AS NEEDED, Disp: , Rfl: 1  •  fluticasone (FLONASE) 50 MCG/ACT nasal  "spray, SHAKE WELL AND USE 2 SPRAYS IN EACH NOSTRIL DAILY, Disp: 16 g, Rfl: 11  •  folic acid (FOLVITE) 400 MCG tablet, Take 400 mcg by mouth Daily., Disp: , Rfl:   •  Ginger, Zingiber officinalis, (GINGER ROOT PO), Take by mouth., Disp: , Rfl:   •  hydroCHLOROthiazide (MICROZIDE) 12.5 MG capsule, TAKE 1 CAPSULE BY MOUTH DAILY, Disp: 90 capsule, Rfl: 3  •  hyoscyamine (LEVSIN) 0.125 MG SL tablet, DISSOLVE 1 TO 2 TABLETS UNDER THE TONGUE EVERY 4 HOURS AS NEEDED FOR ABDOMINAL PAIN, Disp: 60 tablet, Rfl: 2  •  lisinopril (PRINIVIL,ZESTRIL) 10 MG tablet, TAKE 1 TABLET BY MOUTH DAILY, Disp: 90 tablet, Rfl: 1  •  Multiple Vitamin (MULTIVITAMIN) tablet, Take 1 tablet by mouth daily., Disp: , Rfl:   •  potassium chloride (K-DUR,KLOR-CON) 20 MEQ CR tablet, TAKE 1 TABLET BY MOUTH DAILY, Disp: 90 tablet, Rfl: 1  •  sucralfate (CARAFATE) 1 g tablet, DISSOLVE ONE TABLET IN 15ML OF WARM WATER UNTILL IT DISSOLVES, THEN DRINK. DO THIS FOUR TIMES DAILY BEFORE MEALS AND AT BEDTIME, Disp: 120 tablet, Rfl: 5  •  Zinc Sulfate 66 MG tablet, Take  by mouth., Disp: , Rfl:    also entered in Sleep Questionnaire         Vital Signs: BP (!) 184/73   Pulse 85   Ht 165.1 cm (65\")   Wt 91.7 kg (202 lb 3.2 oz)   SpO2 97%   BMI 33.65 kg/m²     Body mass index is 33.65 kg/m².       Tongue: Normal       Dentition: good       Pharynx: Posterior pharyngeal pillars are wide   Mallampatti: III (soft and hard palate and base of uvula visible)        General: Alert. Cooperative. Well developed. No acute distress.             Head:  Normocephalic. Symmetrical. Atraumatic.              Nose: No septal deviation. No drainage.          Throat: No oral lesions. No thrush. Moist mucous membranes.    Chest Wall:  Normal shape. Symmetric expansion with respiration. No tenderness.             Neck:  Trachea midline.           Lungs:  Clear to auscultation bilaterally. No wheezes. No rhonchi. No rales. Respirations regular, even and unlabored.            Heart: "  Regular rhythm and normal rate. Normal S1 and S2. No murmur.     Abdomen:  Soft, non-tender and non-distended. Normal bowel sounds. No masses.  Extremities:  Moves all extremities well. No edema.    Psychiatric: Normal mood and affect.    Study:  · 6/6/19  The patient tolerated the home sleep testing with monitoring time of 500 minutes. The data obtained make this a technically adequate study. The apnea hypopneas index(AHI) was 15.1 per sleep hour.  The AHI during supine position was 19.3 per sleep hour. Mean heart rate of 60.8 BPM.  Snoring was noted 2.4% of sleep time. Lowest oxygen saturation during the study was 82%. Saturation below 89% was noted for 10.7 mins.     Testing:  · 100% compliance average usage 9 hours 24 minutes AHI 1.8 average pressure 12.8 auto CPAP between 10 and 15 cm.    [facility]: Skin Scan    Impression:  1. Obstructive sleep apnea    2. Obesity (BMI 30-39.9)        Plan:  Kelsey is compliant with her CPAP.  She is having some air leaks and I asked her to try diff mask style.  She otherwise wakes up rested and refreshed.    I reiterated the importance of effective treatment of obstructive sleep apnea with PAP machine.  Cardiovascular health risks of untreated sleep apnea were again reviewed.  Patient was asked to remain cautious if there is persistent hypersomnolence. The benefit of weight loss in reducing severity of obstructive sleep apnea was discussed.  Patient would benefit from adhering to a strict diet to achieve ideal BMI.     Change of PAP supplies regularly is important for effective use.  Change of cushion on the mask or plugs on nasal pillows along with disposable filters once every month and change of mask frame, tubing, headgear and Velcro straps every 6 months at the minimum was reiterated.    This patient is compliant with PAP machine and benefits from its use.  Apnea hypopneas index is corrected/improved.  Daytime hypersomnolence has resolved.     Patient will follow up  in this clinic in 1 year KRISTAL    Thank you for allowing me to participate in your patient's care.    Hang Mejia MD    Part of this note may be an electronic transcription/translation of spoken language to printed text using the Dragon Dictation System.

## 2020-11-16 RX ORDER — HYDROCHLOROTHIAZIDE 12.5 MG/1
12.5 CAPSULE, GELATIN COATED ORAL DAILY
Qty: 90 CAPSULE | Refills: 1 | Status: SHIPPED | OUTPATIENT
Start: 2020-11-16 | End: 2021-05-07 | Stop reason: SDUPTHER

## 2020-11-16 NOTE — TELEPHONE ENCOUNTER
Discussed steroid taper to home dose of prednisone 20 mg qd with primary team, Cami, mari am.  Patient currently on 1 mg/kg/d--91 mg.  As 91 mg specifically may be difficult for outpatient pharmacy, recommend 90 mg qd for discharge with the following schedule.    90 mg per day x 1 week, followed by  80 mg per day x 2 weeks, followed by  60 mg per day x 2 weeks, followed by   40 mg per day x 2 weeks, followed by  Home dose of 20 mg per day.    If patient has worsening of symptoms with tapering steroids, please call the Neurology clinic at [phone].  Patient should have follow up with Dr. Preston scheduled prior to discharge.    Danielle Gaxiola MD  PGY2, Neurology  Pager:  [phone]     Paper request from the pharmacy.     Last appt with Dr Blancas: 5/7/2020  Next appt with Neva: NOT SCHEDULED due in 5/2021    Diana- Please schedule.

## 2020-12-10 RX ORDER — LISINOPRIL 10 MG/1
10 TABLET ORAL DAILY
Qty: 90 TABLET | Refills: 1 | Status: SHIPPED | OUTPATIENT
Start: 2020-12-10 | End: 2021-05-07 | Stop reason: SDUPTHER

## 2021-01-18 NOTE — TELEPHONE ENCOUNTER
Last appt with Dr Blancas: 5/7/2020  Next appt with Neva: 5/7/2021    Last labs: 7/1/2020 (CMP)    Pt did run out today.

## 2021-01-19 RX ORDER — POTASSIUM CHLORIDE 20 MEQ/1
20 TABLET, EXTENDED RELEASE ORAL DAILY
Qty: 90 TABLET | Refills: 1 | Status: SHIPPED | OUTPATIENT
Start: 2021-01-19 | End: 2021-07-18

## 2021-02-12 DIAGNOSIS — E78.2 MIXED HYPERLIPIDEMIA: Primary | ICD-10-CM

## 2021-02-12 DIAGNOSIS — E87.6 HYPOKALEMIA: ICD-10-CM

## 2021-02-12 DIAGNOSIS — R73.01 IFG (IMPAIRED FASTING GLUCOSE): ICD-10-CM

## 2021-02-12 LAB
ALBUMIN SERPL-MCNC: 4.1 G/DL (ref 3.5–5.2)
ALBUMIN/GLOB SERPL: 1.6 G/DL
ALP SERPL-CCNC: 93 U/L (ref 39–117)
ALT SERPL-CCNC: 25 U/L (ref 1–33)
AST SERPL-CCNC: 22 U/L (ref 1–32)
BILIRUB SERPL-MCNC: 0.4 MG/DL (ref 0–1.2)
BUN SERPL-MCNC: 11 MG/DL (ref 8–23)
BUN/CREAT SERPL: 10.8 (ref 7–25)
CALCIUM SERPL-MCNC: 9.4 MG/DL (ref 8.6–10.5)
CHLORIDE SERPL-SCNC: 103 MMOL/L (ref 98–107)
CHOLEST SERPL-MCNC: 274 MG/DL (ref 0–200)
CO2 SERPL-SCNC: 31.7 MMOL/L (ref 22–29)
CREAT SERPL-MCNC: 1.02 MG/DL (ref 0.57–1)
GLOBULIN SER CALC-MCNC: 2.6 GM/DL
GLUCOSE SERPL-MCNC: 92 MG/DL (ref 65–99)
HBA1C MFR BLD: 6 % (ref 4.8–5.6)
HDLC SERPL-MCNC: 40 MG/DL (ref 40–60)
LDLC SERPL CALC-MCNC: 205 MG/DL (ref 0–100)
LDLC/HDLC SERPL: 5.07 {RATIO}
POTASSIUM SERPL-SCNC: 4.5 MMOL/L (ref 3.5–5.2)
PROT SERPL-MCNC: 6.7 G/DL (ref 6–8.5)
SODIUM SERPL-SCNC: 146 MMOL/L (ref 136–145)
TRIGL SERPL-MCNC: 156 MG/DL (ref 0–150)
VLDLC SERPL CALC-MCNC: 29 MG/DL (ref 5–40)

## 2021-02-18 RX ORDER — SUCRALFATE 1 G/1
TABLET ORAL
Qty: 120 TABLET | Refills: 5 | OUTPATIENT
Start: 2021-02-18

## 2021-02-19 ENCOUNTER — TELEMEDICINE (OUTPATIENT)
Dept: INTERNAL MEDICINE | Facility: CLINIC | Age: 66
End: 2021-02-19

## 2021-02-19 ENCOUNTER — TELEPHONE (OUTPATIENT)
Dept: GASTROENTEROLOGY | Facility: CLINIC | Age: 66
End: 2021-02-19

## 2021-02-19 DIAGNOSIS — E78.2 MIXED HYPERLIPIDEMIA: Primary | ICD-10-CM

## 2021-02-19 DIAGNOSIS — I10 BENIGN ESSENTIAL HYPERTENSION: ICD-10-CM

## 2021-02-19 DIAGNOSIS — R73.01 IFG (IMPAIRED FASTING GLUCOSE): ICD-10-CM

## 2021-02-19 PROCEDURE — 99214 OFFICE O/P EST MOD 30 MIN: CPT | Performed by: FAMILY MEDICINE

## 2021-02-19 NOTE — PROGRESS NOTES
Subjective   Kelsey Nicholson is a 65 y.o. female.   Chief Complaint   Patient presents with   • IFG   • Hypertension   • Hyperlipidemia       History of Present Illness     Video visit.    #1 IFG- fasting blood sugar 92 from 103, A1c at 6.0 from 6.1.  After last office visit patient improved her diet.  She eats less sweets, less bread, more fruits and vegetables.   She exercises at home for 10 to 15 minutes a day.  She does light weights, sit ups floor exercise and jogging in place.  She changed her job in August.  She works from home.  She enjoys it.     2.  Hyperlipidemia- LDL is 205, HDL 40, patient does not tolerate statins.  She does not tolerate Zetia.     3.  Hypertension-patient is on lisinopril 10 mg a day and HCTZ 12.5 mg a day.  She does not check blood pressure at home.  Last time when she was seen at her pulmonologist office in August blood pressure was high at 184/73.  She thinks that it was due to stress.  She was changing jobs at that time.  She did not check blood pressure since then.        The following portions of the patient's history were reviewed and updated as appropriate: allergies, current medications, past medical history, past social history and problem list.    Review of Systems   Respiratory: Negative for shortness of breath.    Cardiovascular: Negative for chest pain and palpitations.   Psychiatric/Behavioral: Negative.          Objective   Wt Readings from Last 3 Encounters:   08/07/20 91.7 kg (202 lb 3.2 oz)   07/08/20 92.1 kg (203 lb)   05/07/20 88.5 kg (195 lb)    There were no vitals filed for this visit.  Temp Readings from Last 3 Encounters:   07/08/20 96.8 °F (36 °C)   02/03/20 98 °F (36.7 °C) (Oral)   01/17/20 98.2 °F (36.8 °C)     BP Readings from Last 3 Encounters:   08/07/20 (!) 184/73   07/08/20 135/80   05/07/20 112/64     Pulse Readings from Last 3 Encounters:   08/07/20 85   07/08/20 74   05/07/20 70     There is no height or weight on file to calculate  BMI.    Physical Exam  Constitutional:       Appearance: She is obese.   Pulmonary:      Effort: Pulmonary effort is normal.   Neurological:      Mental Status: She is alert.   Psychiatric:         Mood and Affect: Mood normal.         Behavior: Behavior normal.         Assessment/Plan   Diagnoses and all orders for this visit:    1. Mixed hyperlipidemia (Primary)  -     Ambulatory Referral to Nutrition Services    2. IFG (impaired fasting glucose)  -     Ambulatory Referral to Nutrition Services    3. Benign essential hypertension        #1 IFG-better.  She lost 7 pounds.  She is encouraged to increase exercise to at least 30 minutes a day, 5 days a week and continue with dietary changes.  Follow-up in 6 months.  2.  Hyperlipidemia-she does not tolerate statins, she does not tolerate Zetia.  I am referring her to nutritionist.  3.  Hypertension-patient will have her blood pressure checked today.  She will follow-up with me in 6 months, or sooner if blood pressure stays at or above 140/90.

## 2021-03-16 ENCOUNTER — BULK ORDERING (OUTPATIENT)
Dept: CASE MANAGEMENT | Facility: OTHER | Age: 66
End: 2021-03-16

## 2021-03-16 DIAGNOSIS — Z23 IMMUNIZATION DUE: ICD-10-CM

## 2021-05-07 ENCOUNTER — OFFICE VISIT (OUTPATIENT)
Dept: CARDIOLOGY | Facility: CLINIC | Age: 66
End: 2021-05-07

## 2021-05-07 VITALS
DIASTOLIC BLOOD PRESSURE: 80 MMHG | WEIGHT: 203.8 LBS | BODY MASS INDEX: 33.95 KG/M2 | HEIGHT: 65 IN | HEART RATE: 69 BPM | SYSTOLIC BLOOD PRESSURE: 148 MMHG

## 2021-05-07 DIAGNOSIS — I34.1 MVP (MITRAL VALVE PROLAPSE): ICD-10-CM

## 2021-05-07 DIAGNOSIS — I10 BENIGN ESSENTIAL HYPERTENSION: Primary | ICD-10-CM

## 2021-05-07 PROCEDURE — 93000 ELECTROCARDIOGRAM COMPLETE: CPT | Performed by: NURSE PRACTITIONER

## 2021-05-07 PROCEDURE — 99214 OFFICE O/P EST MOD 30 MIN: CPT | Performed by: NURSE PRACTITIONER

## 2021-05-07 RX ORDER — LISINOPRIL 10 MG/1
10 TABLET ORAL DAILY
Qty: 90 TABLET | Refills: 1 | Status: CANCELLED | OUTPATIENT
Start: 2021-05-07

## 2021-05-07 RX ORDER — HYDROCHLOROTHIAZIDE 12.5 MG/1
12.5 CAPSULE, GELATIN COATED ORAL DAILY
Qty: 90 CAPSULE | Refills: 1 | Status: CANCELLED | OUTPATIENT
Start: 2021-05-07

## 2021-05-07 RX ORDER — POTASSIUM CHLORIDE 20 MEQ/1
20 TABLET, EXTENDED RELEASE ORAL DAILY
Qty: 90 TABLET | Refills: 1 | Status: CANCELLED | OUTPATIENT
Start: 2021-05-07

## 2021-05-07 RX ORDER — HYDROCHLOROTHIAZIDE 12.5 MG/1
12.5 CAPSULE, GELATIN COATED ORAL DAILY
Qty: 90 CAPSULE | Refills: 0 | Status: SHIPPED | OUTPATIENT
Start: 2021-05-07 | End: 2021-06-09 | Stop reason: SDUPTHER

## 2021-05-07 RX ORDER — LISINOPRIL 10 MG/1
10 TABLET ORAL DAILY
Qty: 90 TABLET | Refills: 0 | Status: SHIPPED | OUTPATIENT
Start: 2021-05-07 | End: 2021-06-07

## 2021-05-10 NOTE — PROGRESS NOTES
Date of Office Visit: 2021  Encounter Provider: Chelsie Bernard, GRISELDA, APRN  Place of Service: [facility] CARDIOLOGY  Patient Name: Kelsey Nicholson  :1955        Subjective:     Chief Complaint:  Hypertension, mitral valve prolapse      History of Present Illness:  Kelsey Nicholson is a 66 y.o. female patient of Dr. Blancas.  I am seeing this patient in the office today and I reviewed her records.    Patient has a history of hypertension, hyperlipidemia, statin intolerance, GERD, mitral valve prolapse and mitral regurgitation, obesity.    Patient has a distant history of mitral valve prolapse with trivial mitral regurgitation noted on previous JAMES.  Echo 2018 showed normal LV systolic function, grade 1 diastolic dysfunction, structurally normal mitral valve with trivial regurgitation.  Stress echo was negative for ischemia.  In  she fractured her ankle and had plates and screws placed and has had some chronic swelling since that time.  She was seen for preoperative exam 2020 prior to hardware removal for the right ankle.  She is not having anginal symptoms and was cleared to proceed.      Patient presents to office today for follow-up appointment.  Patient reports she is doing well overall since last visit.  She was exercising regularly until a couple weeks ago when she started to have some GI complaints for which she has an upcoming follow-up with gastroenterology to discuss further.  She was doing about 3 days a week of weights and then 10 minutes on the treadmill without symptoms.  Denies any chest pain or discomfort, shortness of breath, shortness of breath with exertion, palpitations, racing heartbeat sensation, dizziness, syncope, falls, or abnormal bleeding.  Using CPAP nightly.  Does have a history of some right jaw/TMJ issues for which she will follow up with dentist and/or PCP.  Blood pressure little high in the office today but previously was running  120s when checked sporadically at home.        Past Medical History:   Diagnosis Date   • Abdominal pain    • Cough    • Daytime somnolence    • GERD (gastroesophageal reflux disease)    • Hyperlipidemia    • Hyperplastic colon polyp    • Hypertension    • Irritable bowel syndrome    • Mitral valve prolapse 2000   • Osteoarthritis    • Pain of left thumb    • PUD (peptic ulcer disease) 02/23/2020   • Pure hypercholesterolemia    • Snoring      Past Surgical History:   Procedure Laterality Date   • ANKLE SURGERY Right    • BREAST BIOPSY     • CHOLECYSTECTOMY  12/2013   • COLONOSCOPY  10/16/2013    IH, Hyperplastic polyp    • COLONOSCOPY  04/25/2018    Diverticulosis in the sigmoid colon and in the descending colon, colon polyp   • ENDOSCOPY  10/16/2013    gastritis.     • ENDOSCOPY N/A 2/3/2020    Procedure: ESOPHAGOGASTRODUODENOSCOPY WITH BIOPSIES;  Surgeon: Jose Enrique Quesada MD;  Location: [facility] ENDOSCOPY;  Service: Gastroenterology   • HYSTERECTOMY     • OOPHORECTOMY     • ROTATOR CUFF REPAIR Left 11/2014   • UPPER GASTROINTESTINAL ENDOSCOPY  04/25/2018    Acute erosive gastritis   • URETHRAL SUSPENSION       Outpatient Medications Prior to Visit   Medication Sig Dispense Refill   • Ascorbic Acid (VITAMIN C) 1000 MG tablet Take 1,000 mg by mouth daily.     • B Complex-C-E-Zn (B COMPLEX-C-E-ZINC) tablet Take 1 tablet by mouth Daily.     • Cholecalciferol 1000 UNITS capsule Take 1,000 Units by mouth Daily.     • dexlansoprazole (DEXILANT) 60 MG capsule Take 1 capsule by mouth 2 (Two) Times a Day. 180 capsule 3   • EPINEPHrine (EPIPEN) 0.3 MG/0.3ML solution auto-injector 0.3 mg once. Inject intramuscularly as directed     • estradiol (ESTRACE) 0.1 MG/GM vaginal cream PLACE 1 GRAM VAGINALLY QHS FOR 1 WEEK THEN TWICE WEEKLY  1   • fluticasone (FLONASE) 50 MCG/ACT nasal spray SHAKE WELL AND USE 2 SPRAYS IN EACH NOSTRIL DAILY 16 g 11   • Jenny, Zingiber officinalis, (JENNY ROOT PO) Take  by mouth. 1  Cup of tea daily      • hyoscyamine (LEVSIN) 0.125 MG SL tablet DISSOLVE 1 TO 2 TABLETS UNDER THE TONGUE EVERY 4 HOURS AS NEEDED FOR ABDOMINAL PAIN 60 tablet 2   • potassium chloride (K-DUR,KLOR-CON) 20 MEQ CR tablet Take 1 tablet by mouth Daily. 90 tablet 1   • Zinc Sulfate 66 MG tablet Take  by mouth.     • hydroCHLOROthiazide (MICROZIDE) 12.5 MG capsule Take 1 capsule by mouth Daily. 90 capsule 1   • lisinopril (PRINIVIL,ZESTRIL) 10 MG tablet Take 1 tablet by mouth Daily. 90 tablet 1   • fluocinonide (LIDEX) 0.05 % ointment APPLY TO ANKLE AND LEG BID FOR 14 DAYS THEN AS NEEDED  1   • sucralfate (CARAFATE) 1 g tablet DISSOLVE ONE TABLET IN 15ML OF WARM WATER UNTILL IT DISSOLVES, THEN DRINK. DO THIS FOUR TIMES DAILY BEFORE MEALS AND AT BEDTIME 120 tablet 5     No facility-administered medications prior to visit.       Allergies as of 05/07/2021 - Reviewed 05/07/2021   Allergen Reaction Noted   • Statins Other (See Comments) 10/20/2015   • Oxycodone-acetaminophen Rash 11/28/2016   • Sulfa antibiotics Rash 06/23/2014     Social History     Socioeconomic History   • Marital status: Single     Spouse name: Not on file   • Number of children: Not on file   • Years of education: Not on file   • Highest education level: Not on file   Tobacco Use   • Smoking status: Never Smoker   • Smokeless tobacco: Never Used   Vaping Use   • Vaping Use: Never assessed   Substance and Sexual Activity   • Alcohol use: No   • Drug use: Defer   • Sexual activity: Defer     Family History   Problem Relation Age of Onset   • Cancer Father    • Diabetes Father    • Hypertension Father    • Stroke Father    • Heart disease Father    • Liver cancer Paternal Uncle        Review of Systems   Constitutional: Positive for weight gain. Negative for chills, fever and malaise/fatigue.   Cardiovascular:        SEE HPI   Respiratory: Positive for snoring. Negative for cough and wheezing.    Hematologic/Lymphatic: Negative for bleeding problem. Does not bruise/bleed easily.  "  Musculoskeletal: Positive for joint pain and myalgias. Negative for falls.   Gastrointestinal: Negative for diarrhea, melena, nausea and vomiting.   Genitourinary: Negative for hematuria.   Neurological: Negative for dizziness.   Psychiatric/Behavioral: Negative for altered mental status, depression and substance abuse. The patient is not nervous/anxious.           Objective:     Vitals:    05/07/21 1512   BP: 148/80   BP Location: Right arm   Cuff Size: Large Adult   Pulse: 69   Weight: 92.4 kg (203 lb 12.8 oz)   Height: 165.1 cm (65\")     Body mass index is 33.91 kg/m².      PHYSICAL EXAM:  Constitutional:       General: Not in acute distress.     Appearance: Well-developed. Obese. Not diaphoretic.   Eyes:      Pupils: Pupils are equal, round, and reactive to light.   HENT:      Head: Normocephalic and atraumatic.   Neck:      Vascular: No carotid bruit.   Pulmonary:      Effort: Pulmonary effort is normal. No respiratory distress.      Breath sounds: Normal breath sounds. No wheezing. No rales.   Cardiovascular:      Normal rate. Regular rhythm.      Murmurs: There is no murmur.      No gallop. No click. No rub.   Edema:     Peripheral edema absent.   Abdominal:      General: Bowel sounds are normal. There is no distension.      Palpations: Abdomen is soft.   Musculoskeletal:         General: No tenderness or deformity.      Cervical back: Neck supple. Skin:     General: Skin is warm and dry.      Findings: No erythema or rash.   Neurological:      Mental Status: Alert and oriented to person, place, and time.   Psychiatric:         Behavior: Behavior normal.         Judgment: Judgment normal.             ECG 12 Lead    Date/Time: 5/7/2021 3:20 PM  Performed by: Chelsie Bernard DNP, APRN  Authorized by: Chelsie Bernard DNP, APRN   Comparison: compared with previous ECG from 5/8/2019  Rhythm: sinus rhythm  Rate: normal  BPM: 69  Other findings: non-specific ST-T wave changes  Comments: No significant " changes from previous EKG              Assessment:       Diagnosis Plan   1. Benign essential hypertension  Basic Metabolic Panel   2. MVP (mitral valve prolapse)           Plan:     1. Hypertension: Blood pressure little high in the office today.  Recommended that she resume checking this at home and call if staying greater than 130/80 on average.  2. Mitral valve prolapse: Noted by previous JAMES with mild regurgitation.  Clinically stable.  No murmur auscultated on exam today.  3. Obstructive sleep apnea: On CPAP therapy  4. Dyslipidemia: PCP managing.  Street of statin intolerance.  She is really going to work on healthy diet and exercise and follow-up with primary care to have cholesterol rechecked.  Cholesterol remains elevated may need to see if we can get a newer medication, such as Repatha?  Patient will keep us updated on when she has repeat labs.  5. Mild renal insufficiency: Cr slightly elevated on last labs.  Will increase hydration and recheck labs 1 week.    6. Obesity: She is going to ease back into her light exercise routine and slowly increase as tolerated.  Will notify office of any exertional symptoms or concerns.    Patient to follow-up with Dr. Blancas in 1 year or sooner if needed for any new, recurrent, or worsening symptoms or other issues or concerns.  Discussed in detail signs/symptoms that warrant sooner call or follow-up to the office.      Records reviewed including but not limited to 2018 stress echo, 2019 EKG, 2/2021 CMP and lipid panel, 7/2020 CMP.           Your medication list          Accurate as of May 7, 2021 11:59 PM. If you have any questions, ask your nurse or doctor.            CONTINUE taking these medications      Instructions Last Dose Given Next Dose Due   ascorbic acid 1000 MG tablet  Commonly known as: VITAMIN C      Take 1,000 mg by mouth daily.       b complex-C-E-zinc tablet      Take 1 tablet by mouth Daily.       Cholecalciferol 25 MCG (1000 UT) capsule      Take 1,000  Units by mouth Daily.       dexlansoprazole 60 MG capsule  Commonly known as: DEXILANT      Take 1 capsule by mouth 2 (Two) Times a Day.       EpiPen 0.3 MG/0.3ML solution auto-injector injection  Generic drug: EPINEPHrine      0.3 mg once. Inject intramuscularly as directed       estradiol 0.1 MG/GM vaginal cream  Commonly known as: ESTRACE      PLACE 1 GRAM VAGINALLY QHS FOR 1 WEEK THEN TWICE WEEKLY       fluticasone 50 MCG/ACT nasal spray  Commonly known as: FLONASE      SHAKE WELL AND USE 2 SPRAYS IN EACH NOSTRIL DAILY       GINGER ROOT PO      Take  by mouth. 1  Cup of tea daily       hydroCHLOROthiazide 12.5 MG capsule  Commonly known as: MICROZIDE      Take 1 capsule by mouth Daily.       hyoscyamine 0.125 MG SL tablet  Commonly known as: LEVSIN      DISSOLVE 1 TO 2 TABLETS UNDER THE TONGUE EVERY 4 HOURS AS NEEDED FOR ABDOMINAL PAIN       lisinopril 10 MG tablet  Commonly known as: PRINIVIL,ZESTRIL      Take 1 tablet by mouth Daily.       potassium chloride 20 MEQ CR tablet  Commonly known as: K-DUR,KLOR-CON      Take 1 tablet by mouth Daily.       Zinc Sulfate 66 MG tablet      Take  by mouth.             Where to Get Your Medications      These medications were sent to [id number] - [place], [place] - [address] [facility]  [address] - [phone]  - [phone]   [address] [facility] [address]    Phone: [phone]   · hydroCHLOROthiazide 12.5 MG capsule  · lisinopril 10 MG tablet         The above medication changes may not have been made by this provider.  Patient's medication list was updated to reflect medications they are currently taking including medication changes made by other providers.            Thanks,    Chelsie Bernard, GRISELDA, APRN  05/07/2021         Dictated utilizing Dragon dictation

## 2021-05-25 ENCOUNTER — OFFICE VISIT (OUTPATIENT)
Dept: GASTROENTEROLOGY | Facility: CLINIC | Age: 66
End: 2021-05-25

## 2021-05-25 VITALS — HEIGHT: 65 IN | WEIGHT: 204 LBS | BODY MASS INDEX: 33.99 KG/M2 | TEMPERATURE: 98.2 F

## 2021-05-25 DIAGNOSIS — R10.10 PAIN OF UPPER ABDOMEN: Primary | ICD-10-CM

## 2021-05-25 DIAGNOSIS — R14.0 ABDOMINAL BLOATING: ICD-10-CM

## 2021-05-25 PROCEDURE — 99214 OFFICE O/P EST MOD 30 MIN: CPT | Performed by: NURSE PRACTITIONER

## 2021-05-25 NOTE — PROGRESS NOTES
"Chief Complaint   Patient presents with   • Abdominal Pain   • Bloated     HPI    Kelsey Nicholson is a  66 y.o. female here for a follow up visit for abdominal pain.  This is an established patient of Dr. Quesada known to me.  She has a history of peptic ulcer disease and colon polyps.  EGD in February of last year showing acute gastritis.  Pathology was benign.    Today her primary complaint is excessive gas and bloating with upper abdominal distention and correlation with eating.  Does not necessarily get early satiety.  No nausea or vomiting.  She feels like food sits on her stomach \"like a stone.\"  No diarrhea, constipation, or rectal bleeding.  Currently on Dexilant 60 mg p.o. twice daily.  She is due for screening colonoscopy in 2023.    Past Medical History:   Diagnosis Date   • Abdominal pain    • Cough    • Daytime somnolence    • GERD (gastroesophageal reflux disease)    • Hyperlipidemia    • Hyperplastic colon polyp    • Hypertension    • Irritable bowel syndrome    • Mitral valve prolapse 2000   • Osteoarthritis    • Pain of left thumb    • PUD (peptic ulcer disease) 02/23/2020   • Pure hypercholesterolemia    • Snoring        Past Surgical History:   Procedure Laterality Date   • ANKLE SURGERY Right    • BREAST BIOPSY     • CHOLECYSTECTOMY  12/2013   • COLONOSCOPY  10/16/2013    IH, Hyperplastic polyp    • COLONOSCOPY  04/25/2018    Diverticulosis in the sigmoid colon and in the descending colon, colon polyp   • ENDOSCOPY  10/16/2013    gastritis.     • ENDOSCOPY N/A 2/3/2020    Procedure: ESOPHAGOGASTRODUODENOSCOPY WITH BIOPSIES;  Surgeon: Jose Enrique Quesada MD;  Location: [facility] ENDOSCOPY;  Service: Gastroenterology   • HYSTERECTOMY     • OOPHORECTOMY     • ROTATOR CUFF REPAIR Left 11/2014   • UPPER GASTROINTESTINAL ENDOSCOPY  04/25/2018    Acute erosive gastritis   • URETHRAL SUSPENSION         Scheduled Meds:  Outpatient Encounter Medications as of 5/25/2021   Medication Sig Dispense Refill   • " Ascorbic Acid (VITAMIN C) 1000 MG tablet Take 1,000 mg by mouth daily.     • B Complex-C-E-Zn (B COMPLEX-C-E-ZINC) tablet Take 1 tablet by mouth Daily.     • Cholecalciferol 1000 UNITS capsule Take 1,000 Units by mouth Daily.     • dexlansoprazole (DEXILANT) 60 MG capsule Take 1 capsule by mouth 2 (Two) Times a Day. 180 capsule 3   • EPINEPHrine (EPIPEN) 0.3 MG/0.3ML solution auto-injector 0.3 mg once. Inject intramuscularly as directed     • estradiol (ESTRACE) 0.1 MG/GM vaginal cream PLACE 1 GRAM VAGINALLY QHS FOR 1 WEEK THEN TWICE WEEKLY  1   • fluticasone (FLONASE) 50 MCG/ACT nasal spray SHAKE WELL AND USE 2 SPRAYS IN EACH NOSTRIL DAILY 16 g 11   • Ginger, Zingiber officinalis, (GINGER ROOT PO) Take  by mouth. 1  Cup of tea daily     • hydroCHLOROthiazide (MICROZIDE) 12.5 MG capsule Take 1 capsule by mouth Daily. 90 capsule 0   • hyoscyamine (LEVSIN) 0.125 MG SL tablet DISSOLVE 1 TO 2 TABLETS UNDER THE TONGUE EVERY 4 HOURS AS NEEDED FOR ABDOMINAL PAIN 60 tablet 2   • lisinopril (PRINIVIL,ZESTRIL) 10 MG tablet Take 1 tablet by mouth Daily. 90 tablet 0   • potassium chloride (K-DUR,KLOR-CON) 20 MEQ CR tablet Take 1 tablet by mouth Daily. 90 tablet 1   • Zinc Sulfate 66 MG tablet Take  by mouth.       No facility-administered encounter medications on file as of 5/25/2021.       Continuous Infusions:No current facility-administered medications for this visit.      PRN Meds:.    Allergies   Allergen Reactions   • Statins Other (See Comments)     TROUBLE BREATHING, MUSCLES HURT   • Oxycodone-Acetaminophen Rash   • Sulfa Antibiotics Rash       Social History     Socioeconomic History   • Marital status: Single     Spouse name: Not on file   • Number of children: Not on file   • Years of education: Not on file   • Highest education level: Not on file   Tobacco Use   • Smoking status: Never Smoker   • Smokeless tobacco: Never Used   Vaping Use   • Vaping Use: Never assessed   Substance and Sexual Activity   • Alcohol  use: No   • Drug use: Defer   • Sexual activity: Defer       Family History   Problem Relation Age of Onset   • Cancer Father    • Diabetes Father    • Hypertension Father    • Stroke Father    • Heart disease Father    • Liver cancer Paternal Uncle        Review of Systems   Constitutional: Negative for activity change, appetite change, fatigue, fever and unexpected weight change.   HENT: Negative for trouble swallowing.    Respiratory: Negative for apnea, cough, choking, chest tightness, shortness of breath and wheezing.    Cardiovascular: Negative for chest pain, palpitations and leg swelling.   Gastrointestinal: Negative for abdominal distention, abdominal pain, anal bleeding, blood in stool, constipation, diarrhea, nausea, rectal pain and vomiting.        + Upper abdominal discomfort with bloating       Vitals:    05/25/21 0953   Temp: 98.2 °F (36.8 °C)       Physical Exam  Constitutional:       Appearance: She is well-developed.   Abdominal:      General: Bowel sounds are normal. There is no distension.      Palpations: Abdomen is soft. There is no mass.      Tenderness: There is no abdominal tenderness. There is no guarding.      Hernia: No hernia is present.   Musculoskeletal:         General: Normal range of motion.   Skin:     General: Skin is warm and dry.      Capillary Refill: Capillary refill takes less than 2 seconds.   Neurological:      Mental Status: She is alert and oriented to person, place, and time.   Psychiatric:         Behavior: Behavior normal.     Assessment    Upper abdominal discomfort  Abdominal distention in correlation with eating  Bloating  Personal history colon polyps    Plan    Arrange gastric emptying study to rule out gastroparesis.  Continue twice daily dosing Dexilant.  Start FD Lizzy samples provided to the patient with dosing instructions.  Antireflux diet.  Further recommendations and follow-up pending the aforementioned work-up.

## 2021-06-07 ENCOUNTER — TELEPHONE (OUTPATIENT)
Dept: CARDIOLOGY | Facility: CLINIC | Age: 66
End: 2021-06-07

## 2021-06-07 RX ORDER — LISINOPRIL 10 MG/1
10 TABLET ORAL DAILY
Qty: 30 TABLET | Refills: 0 | Status: SHIPPED | OUTPATIENT
Start: 2021-06-07 | End: 2021-06-09 | Stop reason: SDUPTHER

## 2021-06-08 NOTE — TELEPHONE ENCOUNTER
Spoke with pt.  She will have this completed in the next week.  Reminder set.  Can you send refill in case she needs it?

## 2021-06-09 ENCOUNTER — TELEPHONE (OUTPATIENT)
Dept: CARDIOLOGY | Facility: CLINIC | Age: 66
End: 2021-06-09

## 2021-06-09 ENCOUNTER — LAB (OUTPATIENT)
Dept: LAB | Facility: HOSPITAL | Age: 66
End: 2021-06-09

## 2021-06-09 DIAGNOSIS — I10 BENIGN ESSENTIAL HYPERTENSION: ICD-10-CM

## 2021-06-09 LAB
ANION GAP SERPL CALCULATED.3IONS-SCNC: 9.9 MMOL/L (ref 5–15)
BUN SERPL-MCNC: 12 MG/DL (ref 8–23)
BUN/CREAT SERPL: 12.5 (ref 7–25)
CALCIUM SPEC-SCNC: 9.3 MG/DL (ref 8.6–10.5)
CHLORIDE SERPL-SCNC: 102 MMOL/L (ref 98–107)
CO2 SERPL-SCNC: 30.1 MMOL/L (ref 22–29)
CREAT SERPL-MCNC: 0.96 MG/DL (ref 0.57–1)
GFR SERPL CREATININE-BSD FRML MDRD: 71 ML/MIN/1.73
GLUCOSE SERPL-MCNC: 104 MG/DL (ref 65–99)
POTASSIUM SERPL-SCNC: 4 MMOL/L (ref 3.5–5.2)
SODIUM SERPL-SCNC: 142 MMOL/L (ref 136–145)

## 2021-06-09 PROCEDURE — 80048 BASIC METABOLIC PNL TOTAL CA: CPT

## 2021-06-09 PROCEDURE — 36415 COLL VENOUS BLD VENIPUNCTURE: CPT

## 2021-06-09 RX ORDER — HYDROCHLOROTHIAZIDE 12.5 MG/1
12.5 CAPSULE, GELATIN COATED ORAL DAILY
Qty: 90 CAPSULE | Refills: 1 | Status: SHIPPED | OUTPATIENT
Start: 2021-06-09 | End: 2021-08-05

## 2021-06-09 RX ORDER — LISINOPRIL 10 MG/1
10 TABLET ORAL DAILY
Qty: 90 TABLET | Refills: 1 | Status: SHIPPED | OUTPATIENT
Start: 2021-06-09 | End: 2022-01-03

## 2021-06-09 NOTE — TELEPHONE ENCOUNTER
Please let patient know that kidney function electrolytes appear stable on current regimen.  Blood sugar was slightly elevated but labs likely done nonfasting which is okay but would have her discuss with PCP at next visit.  Refills sent to pharmacy.

## 2021-06-09 NOTE — TELEPHONE ENCOUNTER
6/9/21  I spoke with patient - gave her this information/instructions - confirmed that she had eaten just prior to the lab draw/hector

## 2021-07-18 RX ORDER — POTASSIUM CHLORIDE 20 MEQ/1
20 TABLET, EXTENDED RELEASE ORAL DAILY
Qty: 90 TABLET | Refills: 1 | Status: SHIPPED | OUTPATIENT
Start: 2021-07-18 | End: 2021-12-30

## 2021-07-28 ENCOUNTER — TELEPHONE (OUTPATIENT)
Dept: INTERNAL MEDICINE | Facility: CLINIC | Age: 66
End: 2021-07-28

## 2021-07-28 NOTE — TELEPHONE ENCOUNTER
Caller: Kelsey Nicholson    Relationship: Self    Best call back number: [phone]  What orders are you requesting (i.e. lab or imaging): LABS TO TEST FOR FIBROMYALGIA     In what timeframe would the patient need to come in: 08- ALREADY HAS A LAB APPOINTMENT    Where will you receive your lab/imaging services: IN OFFICE    Additional notes: PATIENT STATED THAT SHE WOULD LIKE TO BE TESTED FOR FIBROMYALGIA WHEN SHE GETS HER LABS DONE ON 08-

## 2021-08-04 ENCOUNTER — HOSPITAL ENCOUNTER (OUTPATIENT)
Dept: NUCLEAR MEDICINE | Facility: HOSPITAL | Age: 66
Discharge: HOME | End: 2021-08-04

## 2021-08-04 DIAGNOSIS — R14.0 ABDOMINAL BLOATING: ICD-10-CM

## 2021-08-04 DIAGNOSIS — R10.10 PAIN OF UPPER ABDOMEN: ICD-10-CM

## 2021-08-04 PROCEDURE — 78264 GASTRIC EMPTYING IMG STUDY: CPT

## 2021-08-04 PROCEDURE — 0 TECHNETIUM SULFUR COLLOID: Performed by: NURSE PRACTITIONER

## 2021-08-04 PROCEDURE — A9541 TC99M SULFUR COLLOID: HCPCS | Performed by: NURSE PRACTITIONER

## 2021-08-04 RX ADMIN — TECHNETIUM TC 99M SULFUR COLLOID 1 DOSE: KIT at 08:05

## 2021-08-05 RX ORDER — HYDROCHLOROTHIAZIDE 12.5 MG/1
12.5 CAPSULE, GELATIN COATED ORAL DAILY
Qty: 90 CAPSULE | Refills: 1 | Status: SHIPPED | OUTPATIENT
Start: 2021-08-05 | End: 2022-01-03

## 2021-08-06 DIAGNOSIS — R73.01 IFG (IMPAIRED FASTING GLUCOSE): ICD-10-CM

## 2021-08-06 DIAGNOSIS — E78.2 MIXED HYPERLIPIDEMIA: Primary | ICD-10-CM

## 2021-08-06 NOTE — PROGRESS NOTES
Let Melinda know that her gastric emptying study is abnormal consistent with gastroparesis.  Recommend gastroparesis diet please mail a handout to her home as well as referral to [facility] nutritionist to discuss further.  If symptoms do not resolve on diet we may need to consider putting her on medications to help empty her stomach.  Please offer a follow-up to discuss further.  Please arrange referral to nutritionist.

## 2021-08-07 LAB
ALBUMIN SERPL-MCNC: 4.1 G/DL (ref 3.5–5.2)
ALBUMIN/GLOB SERPL: 1.4 G/DL
ALP SERPL-CCNC: 88 U/L (ref 39–117)
ALT SERPL-CCNC: 27 U/L (ref 1–33)
AST SERPL-CCNC: 21 U/L (ref 1–32)
BILIRUB SERPL-MCNC: 0.3 MG/DL (ref 0–1.2)
BUN SERPL-MCNC: 12 MG/DL (ref 8–23)
BUN/CREAT SERPL: 13 (ref 7–25)
CALCIUM SERPL-MCNC: 9.4 MG/DL (ref 8.6–10.5)
CHLORIDE SERPL-SCNC: 104 MMOL/L (ref 98–107)
CHOLEST SERPL-MCNC: 251 MG/DL (ref 0–200)
CO2 SERPL-SCNC: 25.9 MMOL/L (ref 22–29)
CREAT SERPL-MCNC: 0.92 MG/DL (ref 0.57–1)
GLOBULIN SER CALC-MCNC: 2.9 GM/DL
GLUCOSE SERPL-MCNC: 97 MG/DL (ref 65–99)
HBA1C MFR BLD: 6 % (ref 4.8–5.6)
HDLC SERPL-MCNC: 39 MG/DL (ref 40–60)
LDLC SERPL CALC-MCNC: 186 MG/DL (ref 0–100)
LDLC/HDLC SERPL: 4.7 {RATIO}
POTASSIUM SERPL-SCNC: 4 MMOL/L (ref 3.5–5.2)
PROT SERPL-MCNC: 7 G/DL (ref 6–8.5)
SODIUM SERPL-SCNC: 144 MMOL/L (ref 136–145)
TRIGL SERPL-MCNC: 143 MG/DL (ref 0–150)
VLDLC SERPL CALC-MCNC: 26 MG/DL (ref 5–40)

## 2021-08-10 ENCOUNTER — TELEPHONE (OUTPATIENT)
Dept: GASTROENTEROLOGY | Facility: CLINIC | Age: 66
End: 2021-08-10

## 2021-08-10 DIAGNOSIS — K31.84 GASTROPARESIS: Primary | ICD-10-CM

## 2021-08-10 NOTE — TELEPHONE ENCOUNTER
Left VM requesting call back. Also advised to check her my chart.   Referral to [facility] nutritionist and gastroparesis diet mailed to patient's home.

## 2021-08-10 NOTE — TELEPHONE ENCOUNTER
----- Message from Norman Quintero sent at 8/10/2021  9:43 AM EDT -----  Regarding: CALLBACK  Contact: [phone]  PT called in asking for nurses to call her about results. Please reach out, thank you.

## 2021-08-10 NOTE — TELEPHONE ENCOUNTER
Per JULIO Luna: Let Melinda know that her gastric emptying study is abnormal consistent with gastroparesis.  Recommend gastroparesis diet please mail a handout to her home as well as referral to [facility] nutritionist to discuss further.  If symptoms do not resolve on diet we may need to consider putting her on medications to help empty her stomach.  Please offer a follow-up to discuss further.  Please arrange referral to nutritionist.

## 2021-08-13 ENCOUNTER — OFFICE VISIT (OUTPATIENT)
Dept: SLEEP MEDICINE | Facility: HOSPITAL | Age: 66
End: 2021-08-13

## 2021-08-13 ENCOUNTER — OFFICE VISIT (OUTPATIENT)
Dept: INTERNAL MEDICINE | Facility: CLINIC | Age: 66
End: 2021-08-13

## 2021-08-13 VITALS
HEIGHT: 65 IN | WEIGHT: 204 LBS | OXYGEN SATURATION: 97 % | DIASTOLIC BLOOD PRESSURE: 70 MMHG | BODY MASS INDEX: 33.99 KG/M2 | HEART RATE: 67 BPM | SYSTOLIC BLOOD PRESSURE: 147 MMHG

## 2021-08-13 VITALS
HEART RATE: 65 BPM | BODY MASS INDEX: 33.99 KG/M2 | SYSTOLIC BLOOD PRESSURE: 138 MMHG | OXYGEN SATURATION: 97 % | HEIGHT: 65 IN | DIASTOLIC BLOOD PRESSURE: 82 MMHG | TEMPERATURE: 97.1 F | WEIGHT: 204 LBS

## 2021-08-13 DIAGNOSIS — M54.2 ANTERIOR NECK PAIN: ICD-10-CM

## 2021-08-13 DIAGNOSIS — E78.2 MIXED HYPERLIPIDEMIA: ICD-10-CM

## 2021-08-13 DIAGNOSIS — R73.01 IFG (IMPAIRED FASTING GLUCOSE): ICD-10-CM

## 2021-08-13 DIAGNOSIS — Z78.9 DIFFICULTY WITH CPAP FULL FACE MASK USE: ICD-10-CM

## 2021-08-13 DIAGNOSIS — Z00.00 PHYSICAL EXAM, ANNUAL: Primary | ICD-10-CM

## 2021-08-13 DIAGNOSIS — G47.33 OBSTRUCTIVE SLEEP APNEA: Primary | ICD-10-CM

## 2021-08-13 DIAGNOSIS — E66.9 OBESITY (BMI 30-39.9): ICD-10-CM

## 2021-08-13 PROCEDURE — G0463 HOSPITAL OUTPT CLINIC VISIT: HCPCS

## 2021-08-13 PROCEDURE — 99396 PREV VISIT EST AGE 40-64: CPT | Performed by: FAMILY MEDICINE

## 2021-08-13 NOTE — PROGRESS NOTES
[facility]  Telephone: [phone] / Fax: [phone] [place]  Telephone: [phone] / Fax: [phone] Shalonda Ramos    PCP: Elizabeth Dimas MD    Reason for visit: DERRICK f/u    Kelsey Nicholson is a 66 y.o.female  was last seen at [facility] sleep lab in . She has been struggling with her mask at night. She uses Air Fit F20 mask style. It leaks. She has to constantly readjust the straps. Mask leaves indentations on her face. She has a ResMed auto CPAP set at 10-15cm. Pressures appear adequate.  Her sleep schedule is 8:30pm-7:30 am. ESS is 0.     SH- no tobacco, no alcohol, 1 tea per day.    ROS- +GERD, +bloating.    DME changing to [facility].    Current Medications:    Current Outpatient Medications:   •  Ascorbic Acid (VITAMIN C) 1000 MG tablet, Take 1,000 mg by mouth daily., Disp: , Rfl:   •  B Complex-C-E-Zn (B COMPLEX-C-E-ZINC) tablet, Take 1 tablet by mouth Daily., Disp: , Rfl:   •  Cholecalciferol 1000 UNITS capsule, Take 1,000 Units by mouth Daily., Disp: , Rfl:   •  dexlansoprazole (DEXILANT) 60 MG capsule, Take 1 capsule by mouth 2 (Two) Times a Day., Disp: 180 capsule, Rfl: 3  •  EPINEPHrine (EPIPEN) 0.3 MG/0.3ML solution auto-injector, 0.3 mg once. Inject intramuscularly as directed, Disp: , Rfl:   •  estradiol (ESTRACE) 0.1 MG/GM vaginal cream, PLACE 1 GRAM VAGINALLY QHS FOR 1 WEEK THEN TWICE WEEKLY, Disp: , Rfl: 1  •  fluticasone (FLONASE) 50 MCG/ACT nasal spray, SHAKE WELL AND USE 2 SPRAYS IN EACH NOSTRIL DAILY, Disp: 16 g, Rfl: 11  •  Ginger, Zingiber officinalis, (GINGER ROOT PO), Take  by mouth. 1  Cup of tea daily, Disp: , Rfl:   •  hydroCHLOROthiazide (MICROZIDE) 12.5 MG capsule, TAKE 1 CAPSULE BY MOUTH DAILY, Disp: 90 capsule, Rfl: 1  •  hyoscyamine (LEVSIN) 0.125 MG SL tablet, DISSOLVE 1 TO 2 TABLETS UNDER THE TONGUE EVERY 4 HOURS AS NEEDED FOR ABDOMINAL PAIN, Disp: 60 tablet, Rfl: 2  •  lisinopril (PRINIVIL,ZESTRIL) 10 MG tablet, Take 1 tablet by mouth Daily.,  "Disp: 90 tablet, Rfl: 1  •  potassium chloride (K-DUR,KLOR-CON) 20 MEQ CR tablet, TAKE 1 TABLET BY MOUTH DAILY, Disp: 90 tablet, Rfl: 1  •  Zinc Sulfate 66 MG tablet, Take  by mouth., Disp: , Rfl:    also entered in Sleep Questionnaire    Patient  has a past medical history of Abdominal pain, Cough, Daytime somnolence, GERD (gastroesophageal reflux disease), Hyperlipidemia, Hyperplastic colon polyp, Hypertension, Irritable bowel syndrome, Mitral valve prolapse (2000), Osteoarthritis, Pain of left thumb, PUD (peptic ulcer disease) (02/23/2020), Pure hypercholesterolemia, and Snoring.    I have reviewed the Past Medical History, Past Surgical History, Social History and Family History.    Vital Signs /70   Pulse 67   Ht 165.1 cm (65\")   Wt 92.5 kg (204 lb)   SpO2 97%   BMI 33.95 kg/m²  There is no height or weight on file to calculate BMI.    General Alert and oriented. No acute distress noted   Pharynx/Throat Class IV   Mallampati airway, large tongue, no evidence of redundant lateral pharyngeal tissue. No oral lesions. No thrush. Moist mucous membranes.   Head Normocephalic. Symmetrical. Atraumatic.    Nose No septal deviation. No drainage   Chest Wall Normal shape. Symmetric expansion with respiration. No tenderness.   Neck Trachea midline, no thyromegaly or adenopathy    Lungs Clear to auscultation bilaterally. No wheezes. No rhonchi. No rales. Respirations regular, even and unlabored.   Heart Regular rhythm and normal rate. Normal S1 and S2. No murmur   Abdomen Soft, non-tender and non-distended. Normal bowel sounds. No masses.   Extremities Moves all extremities well. No edema   Psychiatric Normal mood and affect.     Testing:  · Download 5/13/21-8/10/21 99% use with average nightly use of 10 hours and 32 minutes on auto CPAP 10-15cm with average pressure of 12.4cm, AHI 1.6.    Study:  · 6/6/19   The patient tolerated the home sleep testing with monitoring time of 500 minutes. The data obtained make " this a technically adequate study. The apnea hypopneas index(AHI) was 15.1 per sleep hour.  The AHI during supine position was 19.3 per sleep hour. Mean heart rate of 60.8 BPM.  Snoring was noted 2.4% of sleep time. Lowest oxygen saturation during the study was 82%. Saturation below 89% was noted for 10.7 mins.        Impression:  1. Obstructive sleep apnea    2. Obesity (BMI 30-39.9)    3. Difficulty with CPAP full face mask use          Plan:  Mask fit Air Fit N20 by tech will be performed today. I suggested we try lower pressures to see if leak improves. She want to hold off and see if leaks disappears with a new mask style. Patient also requests changing DME.from [facility] to [facility]. I asked our staff to transfer her.     She uses the CPAP device and benefits from its use in terms of reduction of hypersomnia and snoring.  AHI appears to be within adequate range. I reviewed download report and original sleep study report with the patient.     Weight loss will be strongly beneficial to reduce the severity of sleep-disordered breathing.  Caution during activities that require prolonged concentration is strongly advised if sleepiness returns. Changing of PAP supplies regularly is important for effective use.      Follow up with Dr. Mejia in one year    Thank you for allowing me to participate in your patient's care.      KRISTAL Garcia  [facility]  Phone: [phone]      Part of this note may be an electronic transcription/translation of spoken language to printed text using the Dragon Dictation System.

## 2021-08-13 NOTE — PROGRESS NOTES
Pt mask fitted in office with N20 small and Dreamwear FF small. Pt is going to take them home and try with her machine a few nights and she will cb to let us know which one she likes best.

## 2021-08-13 NOTE — PROGRESS NOTES
Subjective   Kelsey Nicholson is a 66 y.o. female.   Chief Complaint   Patient presents with   • Annual Exam       History of Present Illness     #1 CPE- pt is here for complete physical exam without GI evaluation to follow-up on prediabetes and hyperlipidemia.  She has no complaints.  She was diagnosed with gastroparesis last week.  She is changing her diet to have smaller portion size.   She saw orthopedist lately for left hip pain.  She was diagnosed with osteoarthritis in both hips, right more than left.  It was also thought that at least some of her symptoms are secondary to  lower back.  She had an MRI of the spine ordered and was referred to PT.  She was diagnosed years ago with osteoarthritis of multiple joints.  She saw rheumatologist for it.    Otherwise there is no change in medical, surgical or family history from last office visit.  No change in prescription medications.  She is not allergic to any new medications.  She does not use tobacco products, she does not drink alcohol, she does not use drugs.  She does not exercise regularly.  Dental appointment was more than 6 months ago.  She is scheduled for eye exam.    She saw her GYN last summer and is scheduled this month.  She is going to have mammogram done at their office.  She had colonoscopy in April 2018.  Next is due 5 years later.  She had tetanus vaccine in 2013.  She had both Shingrix vaccines.     A1c at 6.0  Fasting blood sugar 97.  LDL  186 from 207.  HDL 39.  Patient does not tolerate statins.   Not tolerate Zetia.  At last office visit I referred her to nutritionist, but she could not go as she was changing insurance.    She reports tenderness to touch at the thyroid area.  Started few days ago.  No other symptoms associated.    The following portions of the patient's history were reviewed and updated as appropriate: allergies, current medications, past family history, past medical history, past social history, past surgical history and  problem list.    Review of Systems   Respiratory: Negative for shortness of breath.    Cardiovascular: Negative for chest pain.   Gastrointestinal: Negative for blood in stool.   Genitourinary: Negative for hematuria.   Musculoskeletal: Positive for arthralgias.   Neurological: Negative for light-headedness.   Psychiatric/Behavioral: Negative.          Objective   Wt Readings from Last 3 Encounters:   08/13/21 92.5 kg (204 lb)   08/13/21 92.5 kg (204 lb)   05/25/21 92.5 kg (204 lb)      Vitals:    08/13/21 1015   BP: 138/82   Pulse: 65   Temp: 97.1 °F (36.2 °C)   SpO2: 97%     Temp Readings from Last 3 Encounters:   08/13/21 97.1 °F (36.2 °C)   05/25/21 98.2 °F (36.8 °C)   07/08/20 96.8 °F (36 °C)     BP Readings from Last 3 Encounters:   08/13/21 147/70   08/13/21 138/82   05/07/21 148/80     Pulse Readings from Last 3 Encounters:   08/13/21 67   08/13/21 65   05/07/21 69     Body mass index is 33.95 kg/m².    Physical Exam  Vitals reviewed.   Constitutional:       General: She is not in acute distress.     Appearance: She is well-developed. She is obese. She is not diaphoretic.   HENT:      Head: Normocephalic and atraumatic. Hair is normal.      Right Ear: Hearing, tympanic membrane, ear canal and external ear normal. No decreased hearing noted. No drainage.      Left Ear: Hearing, tympanic membrane, ear canal and external ear normal. No decreased hearing noted.   Eyes:      General: Lids are normal.         Right eye: No discharge.         Left eye: No discharge.      Conjunctiva/sclera: Conjunctivae normal.      Pupils: Pupils are equal, round, and reactive to light.   Neck:      Thyroid: No thyromegaly.      Vascular: No JVD.      Trachea: No tracheal deviation.     Cardiovascular:      Rate and Rhythm: Normal rate and regular rhythm.      Pulses: Normal pulses.      Heart sounds: Normal heart sounds. No murmur heard.   No friction rub. No gallop.    Pulmonary:      Effort: Pulmonary effort is normal. No  respiratory distress.      Breath sounds: Normal breath sounds. No wheezing or rales.   Chest:      Chest wall: No tenderness.   Abdominal:      General: There is no distension.      Palpations: Abdomen is soft. There is no mass.      Tenderness: There is no abdominal tenderness. There is no guarding or rebound.      Hernia: No hernia is present.   Musculoskeletal:         General: No tenderness or deformity. Normal range of motion.      Cervical back: Normal range of motion and neck supple.   Lymphadenopathy:      Cervical: No cervical adenopathy.      Upper Body:      Right upper body: No supraclavicular adenopathy.      Left upper body: No supraclavicular adenopathy.   Skin:     General: Skin is warm and dry.      Findings: No erythema or rash.   Neurological:      Mental Status: She is alert and oriented to person, place, and time.      Cranial Nerves: No cranial nerve deficit.      Motor: No abnormal muscle tone.      Coordination: Coordination normal.      Deep Tendon Reflexes: Reflexes are normal and symmetric. Reflexes normal.   Psychiatric:         Behavior: Behavior normal.         Thought Content: Thought content normal.         Judgment: Judgment normal.         Assessment/Plan   Diagnoses and all orders for this visit:    1. Physical exam, annual (Primary)    2. IFG (impaired fasting glucose)    3. Mixed hyperlipidemia    4. Anterior neck pain        #1 CPE-preventing counseling provided.  Blood work results reviewed with patient.  She is advised to continue to work on weight loss.  She is advised to reduce portion size.  She can try noom.  I advised her to start exercise.  Water exercise will be preferred.  She is advised to schedule appointment with a dentist.  She is scheduled with GYN at the end of this month.  She is up-to-date with vaccinations and cancer screening.  Sun protection discussed and recommended.  2.  Tenderness at the base of the neck at thyroid area-no abnormalities on exam.  It  started few days ago.  Patient will watch it for 2 to 3 weeks and if it does not resolve she will call me and we will order thyroid ultrasound.  3. IFG- wt loss can decrease risk of developing diabetes.  Smaller portion size and exercise can help.  Follow-up in 6 months.  4.  Hyperlipidemia-patient will see nutritionist.  She does not tolerate statins.  She does not tolerate Zetia.

## 2021-08-23 ENCOUNTER — TELEPHONE (OUTPATIENT)
Dept: SLEEP MEDICINE | Facility: HOSPITAL | Age: 66
End: 2021-08-23

## 2021-09-09 ENCOUNTER — HOSPITAL ENCOUNTER (OUTPATIENT)
Dept: DIABETES SERVICES | Facility: HOSPITAL | Age: 66
Discharge: HOME | End: 2021-09-09
Admitting: NURSE PRACTITIONER

## 2021-09-09 PROCEDURE — 97802 MEDICAL NUTRITION INDIV IN: CPT

## 2021-10-25 ENCOUNTER — TELEPHONE (OUTPATIENT)
Dept: INTERNAL MEDICINE | Facility: CLINIC | Age: 66
End: 2021-10-25

## 2021-10-25 NOTE — TELEPHONE ENCOUNTER
Caller: Kelsey Nicholson     Relationship: PATIENT    Best call back number: [phone] (M)    What is your medical concern? RECALL ON BLOOD PRESSURE MEDICATION    How long has this issue been going on? SAW THE RECALL THIS MORNING    Is your provider already aware of this issue? NO    PATIENT WOULD LIKE A CALLBACK TO KNOW WHETHER IT IS SAFE TO CONTINUE USING HER hydroCHLOROthiazide (MICROZIDE) 12.5 MG capsule DUE TO RECALL. PLEASE CALL PATIENT BACK ASAP TO ADVISE. PATIENT HAS ALREADY TAKEN HER DOSE TODAY.

## 2021-12-30 RX ORDER — POTASSIUM CHLORIDE 20 MEQ/1
20 TABLET, EXTENDED RELEASE ORAL DAILY
Qty: 90 TABLET | Refills: 1 | Status: SHIPPED | OUTPATIENT
Start: 2021-12-30 | End: 2022-07-12 | Stop reason: SDUPTHER

## 2022-01-03 RX ORDER — LISINOPRIL 10 MG/1
10 TABLET ORAL DAILY
Qty: 90 TABLET | Refills: 1 | Status: SHIPPED | OUTPATIENT
Start: 2022-01-03 | End: 2022-08-08

## 2022-01-03 RX ORDER — HYDROCHLOROTHIAZIDE 12.5 MG/1
12.5 CAPSULE, GELATIN COATED ORAL DAILY
Qty: 90 CAPSULE | Refills: 1 | Status: SHIPPED | OUTPATIENT
Start: 2022-01-03 | End: 2022-07-29 | Stop reason: SDUPTHER

## 2022-02-11 DIAGNOSIS — E78.2 MIXED HYPERLIPIDEMIA: ICD-10-CM

## 2022-02-11 DIAGNOSIS — R73.01 IFG (IMPAIRED FASTING GLUCOSE): Primary | ICD-10-CM

## 2022-02-12 LAB
ALBUMIN SERPL-MCNC: 4.3 G/DL (ref 3.8–4.8)
ALBUMIN/GLOB SERPL: 1.5 {RATIO} (ref 1.2–2.2)
ALP SERPL-CCNC: 98 IU/L (ref 44–121)
ALT SERPL-CCNC: 36 IU/L (ref 0–32)
AST SERPL-CCNC: 34 IU/L (ref 0–40)
BILIRUB SERPL-MCNC: 0.5 MG/DL (ref 0–1.2)
BUN SERPL-MCNC: 9 MG/DL (ref 8–27)
BUN/CREAT SERPL: 9 (ref 12–28)
CALCIUM SERPL-MCNC: 9.6 MG/DL (ref 8.7–10.3)
CHLORIDE SERPL-SCNC: 102 MMOL/L (ref 96–106)
CHOLEST SERPL-MCNC: 266 MG/DL (ref 100–199)
CO2 SERPL-SCNC: 25 MMOL/L (ref 20–29)
CREAT SERPL-MCNC: 1.03 MG/DL (ref 0.57–1)
GLOBULIN SER CALC-MCNC: 2.8 G/DL (ref 1.5–4.5)
GLUCOSE SERPL-MCNC: 98 MG/DL (ref 65–99)
HBA1C MFR BLD: 6.1 % (ref 4.8–5.6)
HDLC SERPL-MCNC: 39 MG/DL
LDLC SERPL CALC-MCNC: 200 MG/DL (ref 0–99)
LDLC/HDLC SERPL: 5.1 RATIO (ref 0–3.2)
POTASSIUM SERPL-SCNC: 4.3 MMOL/L (ref 3.5–5.2)
PROT SERPL-MCNC: 7.1 G/DL (ref 6–8.5)
SODIUM SERPL-SCNC: 144 MMOL/L (ref 134–144)
TRIGL SERPL-MCNC: 145 MG/DL (ref 0–149)
VLDLC SERPL CALC-MCNC: 27 MG/DL (ref 5–40)

## 2022-02-18 ENCOUNTER — OFFICE VISIT (OUTPATIENT)
Dept: INTERNAL MEDICINE | Facility: CLINIC | Age: 67
End: 2022-02-18

## 2022-02-18 VITALS
SYSTOLIC BLOOD PRESSURE: 130 MMHG | OXYGEN SATURATION: 99 % | BODY MASS INDEX: 34.59 KG/M2 | HEIGHT: 65 IN | HEART RATE: 67 BPM | TEMPERATURE: 97.1 F | WEIGHT: 207.6 LBS | DIASTOLIC BLOOD PRESSURE: 80 MMHG

## 2022-02-18 DIAGNOSIS — I10 BENIGN ESSENTIAL HYPERTENSION: Primary | ICD-10-CM

## 2022-02-18 DIAGNOSIS — R73.01 IFG (IMPAIRED FASTING GLUCOSE): ICD-10-CM

## 2022-02-18 DIAGNOSIS — E78.2 MIXED HYPERLIPIDEMIA: ICD-10-CM

## 2022-02-18 PROBLEM — F32.A DEPRESSIVE DISORDER: Status: ACTIVE | Noted: 2022-02-18

## 2022-02-18 PROBLEM — M19.90 ARTHRITIS: Status: ACTIVE | Noted: 2022-02-18

## 2022-02-18 PROBLEM — N20.0 CALCULUS OF KIDNEY: Status: ACTIVE | Noted: 2022-02-18

## 2022-02-18 PROBLEM — T84.84XA PAINFUL ORTHOPAEDIC HARDWARE (HCC): Status: ACTIVE | Noted: 2020-05-12

## 2022-02-18 PROCEDURE — 99214 OFFICE O/P EST MOD 30 MIN: CPT | Performed by: FAMILY MEDICINE

## 2022-02-18 RX ORDER — TOLTERODINE 2 MG/1
2 CAPSULE, EXTENDED RELEASE ORAL DAILY
COMMUNITY
Start: 2021-08-30 | End: 2022-02-18

## 2022-02-18 NOTE — PROGRESS NOTES
Subjective   Kelsey Nicholson is a 66 y.o. female.   Chief Complaint   Patient presents with   • Hypertension   • Hyperlipidemia   • IGF       History of Present Illness     #1 IFG- fasting blood sugar 98, A1c at 6.1 from 6.0.  She gained 3 pounds from August.  She works from home.  She does not exercise regularly.  She has chronic back pain with sciatica.  She was treated with spinal injections, but they were not helpful.  She started exercise for back pain in the [facility].  She started it about 6 weeks ago.  She says it helps.      2.  Hyperlipidemia- LDL is 200, HDL 39, patient does not tolerate statins.  She does not tolerate Zetia.  Met with nutritionist after last office visit.  She says that they recommended that she decrease portion size, but otherwise did not suggest any specific dietary changes.     3.  Hypertension-patient is on lisinopril 10 mg a day and HCTZ 12.5 mg a day.  She also takes potassium at 20 mEq a day.  She has no side effects.  No chest pain, no shortness of breath, no lightheadedness.  Creatinine 1.03, GFR 66.  Normal potassium.      The following portions of the patient's history were reviewed and updated as appropriate: allergies, current medications, past family history, past medical history, past social history, past surgical history and problem list.    Review of Systems   Constitutional: Negative.    Respiratory: Negative.    Cardiovascular: Negative.    Neurological: Negative.          Objective   Wt Readings from Last 3 Encounters:   02/18/22 94.2 kg (207 lb 9.6 oz)   09/04/21 92.1 kg (203 lb)   08/13/21 92.5 kg (204 lb)      Vitals:    02/18/22 0837   BP: 130/80   Pulse: 67   Temp: 97.1 °F (36.2 °C)   SpO2: 99%     Temp Readings from Last 3 Encounters:   02/18/22 97.1 °F (36.2 °C)   09/04/21 98.5 °F (36.9 °C) (Temporal)   08/13/21 97.1 °F (36.2 °C)     BP Readings from Last 3 Encounters:   02/18/22 130/80   09/04/21 144/76   08/13/21 147/70     Pulse Readings from Last 3  Encounters:   02/18/22 67   09/04/21 70   08/13/21 67     Body mass index is 34.55 kg/m².    Physical Exam  Constitutional:       Appearance: She is well-developed. She is obese.   HENT:      Head: Normocephalic and atraumatic.   Neck:      Thyroid: No thyromegaly.      Vascular: No carotid bruit.   Cardiovascular:      Rate and Rhythm: Normal rate and regular rhythm.      Heart sounds: Normal heart sounds.   Pulmonary:      Effort: Pulmonary effort is normal.      Breath sounds: Normal breath sounds.   Musculoskeletal:      Cervical back: Neck supple.   Skin:     General: Skin is warm and dry.   Neurological:      Mental Status: She is alert and oriented to person, place, and time.   Psychiatric:         Behavior: Behavior normal.         Assessment/Plan   Diagnoses and all orders for this visit:    1. Benign essential hypertension (Primary)  -     CBC (No Diff); Future  -     Comprehensive Metabolic Panel; Future    2. Mixed hyperlipidemia    3. IFG (impaired fasting glucose)  -     Comprehensive Metabolic Panel; Future  -     Hemoglobin A1c; Future        #1 hypertension-continue current treatment.  Follow-up in 6 months.  2.  Impaired fasting glucose-worsening comparing to last time.  Decrease carbs, increase physical activity as tolerated.  Follow-up in 6 months.  We discussed referral to physical therapy to help with back pain, she is comfortable with current program at wellness center.  She will get back with me if she needs additional help.  3.  Hyperlipidemia-patient does not tolerate statins.  She does not tolerate zetia.  She will focus on lifestyle changes.  She is going to join weight watchers.

## 2022-07-12 RX ORDER — POTASSIUM CHLORIDE 20 MEQ/1
20 TABLET, EXTENDED RELEASE ORAL DAILY
Qty: 90 TABLET | Refills: 1 | Status: SHIPPED | OUTPATIENT
Start: 2022-07-12 | End: 2022-07-25

## 2022-07-12 NOTE — TELEPHONE ENCOUNTER
Next appointment with Neva: 8/23/2022  Last appointment with Neva: 5/7/2021    Last labs: 2/11/2022 (CMP)

## 2022-07-25 RX ORDER — POTASSIUM CHLORIDE 20 MEQ/1
20 TABLET, EXTENDED RELEASE ORAL DAILY
Qty: 90 TABLET | Refills: 1 | Status: SHIPPED | OUTPATIENT
Start: 2022-07-25 | End: 2022-09-09 | Stop reason: SDUPTHER

## 2022-07-29 RX ORDER — HYDROCHLOROTHIAZIDE 12.5 MG/1
12.5 CAPSULE, GELATIN COATED ORAL DAILY
Qty: 90 CAPSULE | Refills: 1 | Status: SHIPPED | OUTPATIENT
Start: 2022-07-29 | End: 2022-09-09 | Stop reason: SDUPTHER

## 2022-07-29 NOTE — TELEPHONE ENCOUNTER
Next appointment with Neva: 8/23/2022  Last appointment with Neva: 5/7/2021    Last labs: 2/18/2022 (CMP)

## 2022-08-08 RX ORDER — LISINOPRIL 10 MG/1
10 TABLET ORAL DAILY
Qty: 90 TABLET | Refills: 1 | Status: SHIPPED | OUTPATIENT
Start: 2022-08-08 | End: 2022-09-09 | Stop reason: SDUPTHER

## 2022-08-19 ENCOUNTER — APPOINTMENT (OUTPATIENT)
Dept: SLEEP MEDICINE | Facility: HOSPITAL | Age: 67
End: 2022-08-19

## 2022-09-09 ENCOUNTER — OFFICE VISIT (OUTPATIENT)
Dept: CARDIOLOGY | Facility: CLINIC | Age: 67
End: 2022-09-09

## 2022-09-09 VITALS
HEIGHT: 65 IN | HEART RATE: 64 BPM | WEIGHT: 203.4 LBS | DIASTOLIC BLOOD PRESSURE: 78 MMHG | BODY MASS INDEX: 33.89 KG/M2 | SYSTOLIC BLOOD PRESSURE: 128 MMHG

## 2022-09-09 DIAGNOSIS — E78.2 MIXED HYPERLIPIDEMIA: ICD-10-CM

## 2022-09-09 DIAGNOSIS — I10 BENIGN ESSENTIAL HYPERTENSION: Primary | ICD-10-CM

## 2022-09-09 DIAGNOSIS — I34.1 MVP (MITRAL VALVE PROLAPSE): ICD-10-CM

## 2022-09-09 PROCEDURE — 93000 ELECTROCARDIOGRAM COMPLETE: CPT | Performed by: NURSE PRACTITIONER

## 2022-09-09 PROCEDURE — 99214 OFFICE O/P EST MOD 30 MIN: CPT | Performed by: NURSE PRACTITIONER

## 2022-09-09 RX ORDER — POTASSIUM CHLORIDE 20 MEQ/1
20 TABLET, EXTENDED RELEASE ORAL DAILY
Qty: 90 TABLET | Refills: 1 | Status: SHIPPED | OUTPATIENT
Start: 2022-09-09 | End: 2023-04-03

## 2022-09-09 RX ORDER — NICOTINE POLACRILEX 2 MG
GUM BUCCAL
COMMUNITY
Start: 2022-05-27

## 2022-09-09 RX ORDER — CHLORAL HYDRATE 500 MG
CAPSULE ORAL
COMMUNITY
Start: 2022-05-27

## 2022-09-09 RX ORDER — HYDROCHLOROTHIAZIDE 12.5 MG/1
12.5 CAPSULE, GELATIN COATED ORAL DAILY
Qty: 90 CAPSULE | Refills: 1 | Status: SHIPPED | OUTPATIENT
Start: 2022-09-09 | End: 2023-01-31 | Stop reason: SDUPTHER

## 2022-09-09 RX ORDER — LISINOPRIL 10 MG/1
10 TABLET ORAL DAILY
Qty: 90 TABLET | Refills: 1 | Status: SHIPPED | OUTPATIENT
Start: 2022-09-09 | End: 2023-01-30 | Stop reason: SDUPTHER

## 2022-09-09 NOTE — PROGRESS NOTES
Date of Office Visit: 2022  Encounter Provider: Chlesie Bernard, GRISELDA, APRN  Place of Service: [facility]  Patient Name: Kelsey Nicholson  :1955        Subjective:     Chief Complaint:  Hypertension, mitral regurgitation      History of Present Illness:  Kelsey Nicholson is a 67 y.o. female patient of Dr. Blancas.  I am seeing this patient in the office today and I have reviewed her records.    Patient has a history of hypertension, hyperlipidemia, statin intolerance, GERD, mitral valve prolapse and mitral regurgitation, obesity.     Patient has a distant history of mitral valve prolapse with trivial mitral regurgitation noted on previous JAMES.  Echo 2018 showed normal LV systolic function, grade 1 diastolic dysfunction, structurally normal mitral valve with trivial regurgitation.  Stress echo was negative for ischemia.  In  she fractured her ankle and had plates and screws placed and has had some chronic swelling since that time.  She was seen for preoperative exam 2020 prior to hardware removal for the right ankle.  She is not having anginal symptoms and was cleared to proceed.      Patient presents to office today for follow-up appointment.  Patient reports she is doing well overall since last visit.  She has some arthritis in her hips and some tight thigh muscles but is feeling improved after doing some leg stretches with [employer].  She has not been doing formal exercise recently but does stay fairly active.  She denies any exertional symptoms or concerns.  She denies any chest pain or discomfort, shortness of breath, shortness of breath with exertion, palpitations, racing heartbeat sensation, lower extremity edema, dizziness, syncope, near syncope, falls, fatigue, or abnormal bleeding.  Blood pressure is well controlled in the office today.  Her cholesterol remains quite high on last labs through PCP and we had a long detailed discussion about  healthy diet, exercise, and lifestyle as well as cholesterol control.  Recommended checking CT coronary calcium score and vascular screening bundle to further stratify long-term cardiac risks.  May need to see if she would qualify for an injectable based on results and if cholesterol fails to improve with diet and exercise and weight loss.          Past Medical History:   Diagnosis Date   • Abdominal pain    • Cough    • Daytime somnolence    • GERD (gastroesophageal reflux disease)    • Hyperlipidemia    • Hyperplastic colon polyp    • Hypertension    • Irritable bowel syndrome    • Mitral valve prolapse 2000   • Osteoarthritis    • Pain of left thumb    • PUD (peptic ulcer disease) 02/23/2020   • Pure hypercholesterolemia    • Snoring      Past Surgical History:   Procedure Laterality Date   • ANKLE SURGERY Right    • BREAST BIOPSY     • CHOLECYSTECTOMY  12/2013   • COLONOSCOPY  10/16/2013    IH, Hyperplastic polyp    • COLONOSCOPY  04/25/2018    Diverticulosis in the sigmoid colon and in the descending colon, colon polyp   • ENDOSCOPY  10/16/2013    gastritis.     • ENDOSCOPY N/A 2/3/2020    Procedure: ESOPHAGOGASTRODUODENOSCOPY WITH BIOPSIES;  Surgeon: Jose Enrique Quesada MD;  Location: [facility];  Service: Gastroenterology   • HYSTERECTOMY     • OOPHORECTOMY     • ROTATOR CUFF REPAIR Left 11/2014   • UPPER GASTROINTESTINAL ENDOSCOPY  04/25/2018    Acute erosive gastritis   • URETHRAL SUSPENSION       Outpatient Medications Prior to Visit   Medication Sig Dispense Refill   • Ascorbic Acid (VITAMIN C) 1000 MG tablet Take 1,000 mg by mouth daily.     • B Complex-C-E-Zn (B COMPLEX-C-E-ZINC) tablet Take 1 tablet by mouth Daily.     • Biotin 1 MG capsule   Oral, Daily, 0 Refill(s)     • Cholecalciferol 1000 UNITS capsule Take 1,000 Units by mouth Daily.     • dexlansoprazole (DEXILANT) 60 MG capsule Take 1 capsule by mouth 2 (Two) Times a Day. 180 capsule 3   • EPINEPHrine (EPIPEN) 0.3 MG/0.3ML solution  auto-injector injection 0.3 mg once. Inject intramuscularly as directed     • estradiol (ESTRACE) 0.1 MG/GM vaginal cream PLACE 1 GRAM VAGINALLY QHS FOR 1 WEEK THEN TWICE WEEKLY  1   • fluticasone (FLONASE) 50 MCG/ACT nasal spray SHAKE WELL AND USE 2 SPRAYS IN EACH NOSTRIL DAILY 16 g 11   • Ginger, Zingiber officinalis, (GINGER ROOT PO) Take  by mouth. 1  Cup of tea daily     • Omega-3 Fatty Acids (fish oil) 1000 MG capsule capsule   Oral, Daily, 0 Refill(s)     • Zinc Sulfate 66 MG tablet Take  by mouth.     • hydroCHLOROthiazide (MICROZIDE) 12.5 MG capsule Take 1 capsule by mouth Daily. 90 capsule 1   • lisinopril (PRINIVIL,ZESTRIL) 10 MG tablet TAKE 1 TABLET BY MOUTH DAILY 90 tablet 1   • potassium chloride (K-DUR,KLOR-CON) 20 MEQ CR tablet TAKE 1 TABLET BY MOUTH DAILY 90 tablet 1   • cyclobenzaprine (FLEXERIL) 10 MG tablet Take 1 tablet by mouth 2 (Two) Times a Day As Needed for Muscle Spasms. 5 tablet 0   • hyoscyamine (LEVSIN) 0.125 MG SL tablet DISSOLVE 1 TO 2 TABLETS UNDER THE TONGUE EVERY 4 HOURS AS NEEDED FOR ABDOMINAL PAIN 60 tablet 2     No facility-administered medications prior to visit.       Allergies as of 09/09/2022 - Reviewed 09/09/2022   Allergen Reaction Noted   • Statins Other (See Comments) 10/20/2015   • Oxycodone-acetaminophen Rash 11/28/2016   • Sulfa antibiotics Rash 06/23/2014     Social History     Socioeconomic History   • Marital status: Single   Tobacco Use   • Smoking status: Never Smoker   • Smokeless tobacco: Never Used   Substance and Sexual Activity   • Alcohol use: No   • Drug use: Defer   • Sexual activity: Defer     Family History   Problem Relation Age of Onset   • Cancer Father    • Diabetes Father    • Hypertension Father    • Stroke Father    • Heart disease Father    • Liver cancer Paternal Uncle        Review of Systems   Constitutional: Negative for malaise/fatigue.   Cardiovascular:        SEE HPI   Respiratory: Negative for shortness of breath.   "  Hematologic/Lymphatic: Negative for bleeding problem.   Musculoskeletal: Negative for falls.   Gastrointestinal: Negative for melena.   Genitourinary: Negative for hematuria.   Neurological: Negative for dizziness.   Psychiatric/Behavioral: Negative for altered mental status.          Objective:     Vitals:    09/09/22 0826   BP: 128/78   BP Location: Left arm   Patient Position: Sitting   Pulse: 64   Weight: 92.3 kg (203 lb 6.4 oz)   Height: 165.1 cm (65\")     Body mass index is 33.85 kg/m².      PHYSICAL EXAM:  Constitutional:       General: Not in acute distress.     Appearance: Well-developed. Not diaphoretic.   Eyes:      Pupils: Pupils are equal, round, and reactive to light.   HENT:      Head: Normocephalic and atraumatic.   Pulmonary:      Effort: Pulmonary effort is normal. No respiratory distress.      Breath sounds: Normal breath sounds. No wheezing. No rales.   Cardiovascular:      Normal rate. Regular rhythm.      Murmurs: There is no murmur.      No gallop. No click. No rub.   Edema:     Peripheral edema absent.   Abdominal:      General: Bowel sounds are normal.      Palpations: Abdomen is soft.   Musculoskeletal:         General: No tenderness or deformity. Skin:     General: Skin is warm and dry.      Findings: No erythema.   Neurological:      Mental Status: Alert and oriented to person, place, and time.             ECG 12 Lead    Date/Time: 9/9/2022 9:10 AM  Performed by: Chelsie Bernard DNP, APRN  Authorized by: Chelsie Bernard DNP, KRISTAL   Comparison: compared with previous ECG from 5/7/2021  Rhythm: sinus rhythm  BPM: 64  Comments: No significant changes from previous EKGs                Assessment:       Diagnosis Plan   1. Benign essential hypertension     2. Mixed hyperlipidemia     3. MVP (mitral valve prolapse)           Plan:     1. Hypertension: well controlled with current regimen.  Continue the same.  2. Mitral valve prolapse: noted by previous JAMES with trivial regurgitation. " Echo in 2018 without prolapse and only trivial regurgitation.  Patient is asymptomatic and no murmur on exam.  Clinically stable, continue to monitor.  3. Obstructive sleep apnea on CPAP  4. Dyslipidemia: PCP managing.  Unfortunately patient has history of significant statin intolerance.  We discussed healthy diet, exercise, and weight loss in great detail.  We discussed water aerobics which she may be able to tolerate better from a joint standpoint.  Also recommended CT coronary calcium score and vascular screening bundle to further stratify long-term cardiac risks.  Based on results and if cholesterol fails to improve with the above, may need to see if she would qualify for an injectable medication.  5. History of mild renal insufficiency: PCP following.  Stable on last labs.  6. Obesity: As above    Patient to follow-up with Dr. Blancas in 1 year or follow-up sooner if needed for any new, recurrent, or worsening symptoms or concerns.  Discussed in detail signs/symptoms that warrant sooner call or follow-up to the office or ER visit.             Your medication list          Accurate as of September 9, 2022  9:10 AM. If you have any questions, ask your nurse or doctor.            CONTINUE taking these medications      Instructions Last Dose Given Next Dose Due   ascorbic acid 1000 MG tablet  Commonly known as: VITAMIN C      Take 1,000 mg by mouth daily.       b complex-C-E-zinc tablet      Take 1 tablet by mouth Daily.       Biotin 1 MG capsule      Oral, Daily, 0 Refill(s)       Cholecalciferol 25 MCG (1000 UT) capsule      Take 1,000 Units by mouth Daily.       dexlansoprazole 60 MG capsule  Commonly known as: DEXILANT      Take 1 capsule by mouth 2 (Two) Times a Day.       EPINEPHrine 0.3 MG/0.3ML solution auto-injector injection  Commonly known as: EPIPEN      0.3 mg once. Inject intramuscularly as directed       estradiol 0.1 MG/GM vaginal cream  Commonly known as: ESTRACE      PLACE 1 GRAM VAGINALLY QHS FOR 1  WEEK THEN TWICE WEEKLY       fish oil 1000 MG capsule capsule      Oral, Daily, 0 Refill(s)       fluticasone 50 MCG/ACT nasal spray  Commonly known as: FLONASE      SHAKE WELL AND USE 2 SPRAYS IN EACH NOSTRIL DAILY       JENNY ROOT PO      Take  by mouth. 1  Cup of tea daily       hydroCHLOROthiazide 12.5 MG capsule  Commonly known as: MICROZIDE      Take 1 capsule by mouth Daily.       lisinopril 10 MG tablet  Commonly known as: PRINIVIL,ZESTRIL      Take 1 tablet by mouth Daily.       potassium chloride 20 MEQ CR tablet  Commonly known as: K-DUR,KLOR-CON      Take 1 tablet by mouth Daily.       Zinc Sulfate 66 MG tablet      Take  by mouth.             Where to Get Your Medications      These medications were sent to [id number] - [place], [place] - [address] AT [facility] [address]  [place] - [phone]  - [phone]   [address], [facility] [address]    Phone: [phone]   · hydroCHLOROthiazide 12.5 MG capsule  · lisinopril 10 MG tablet     Information about where to get these medications is not yet available    Ask your nurse or doctor about these medications  · potassium chloride 20 MEQ CR tablet         The above medication changes may not have been made by this provider.  Patient's medication list was updated to reflect medications they are currently taking including medication changes made by other providers.            Thanks,    Chelsie Bernard, GRISELDA, APRN  09/09/2022         Dictated utilizing Dragon dictation

## 2022-10-28 ENCOUNTER — TELEPHONE (OUTPATIENT)
Dept: CARDIOLOGY | Facility: CLINIC | Age: 67
End: 2022-10-28

## 2022-10-28 NOTE — TELEPHONE ENCOUNTER
Please let patient know that I received a message that her lisinopril lot may have been recalled.  Would have her double check with her pharmacy to see if the prescription she picked up may have been affected by this recall.  If so then would see if she can get lisinopril filled at another pharmacy and we can send prescription there if needed.

## 2022-11-11 ENCOUNTER — OFFICE VISIT (OUTPATIENT)
Dept: SLEEP MEDICINE | Facility: HOSPITAL | Age: 67
End: 2022-11-11

## 2022-11-11 VITALS
BODY MASS INDEX: 33.99 KG/M2 | WEIGHT: 204 LBS | HEIGHT: 65 IN | DIASTOLIC BLOOD PRESSURE: 70 MMHG | HEART RATE: 66 BPM | OXYGEN SATURATION: 98 % | SYSTOLIC BLOOD PRESSURE: 145 MMHG

## 2022-11-11 DIAGNOSIS — G47.33 OBSTRUCTIVE SLEEP APNEA: Primary | ICD-10-CM

## 2022-11-11 DIAGNOSIS — E66.9 OBESITY (BMI 30-39.9): ICD-10-CM

## 2022-11-11 PROCEDURE — G0463 HOSPITAL OUTPT CLINIC VISIT: HCPCS

## 2022-11-11 NOTE — PROGRESS NOTES
[facility]  [address]  [place] [address] [phone]    PCP: Elizabeth Dimas MD    Reason for visit:  Sleep disorders: DERRICK    Kelsey is a 67 y.o.female who was seen in the [facility] today. Annual fu. She is doing well with her CPAP. She uses a Dreamwear ffm. It causes strap marks on the face. She sleeps from 8:30pm to 6am. She feels the CPAP helps and she wakes up more rested. No EDS.  Daphne Sleepiness Scale is 1. Caffeine 1 per day. Alcohol 0 per week.    Kelsey  reports that she has never smoked. She has never used smokeless tobacco.    Pertinent Positive Review of Systems of acid reflux, abdominal bloating  Rest of Review of Systems was negative as recorded in Sleep Questionnaire.    Patient  has a past medical history of Abdominal pain, Cough, Daytime somnolence, GERD (gastroesophageal reflux disease), Hyperlipidemia, Hyperplastic colon polyp, Hypertension, Irritable bowel syndrome, Mitral valve prolapse (2000), Osteoarthritis, Pain of left thumb, PUD (peptic ulcer disease) (02/23/2020), Pure hypercholesterolemia, and Snoring.     Current Medications:    Current Outpatient Medications:   •  Ascorbic Acid (VITAMIN C) 1000 MG tablet, Take 1,000 mg by mouth daily., Disp: , Rfl:   •  B Complex-C-E-Zn (B COMPLEX-C-E-ZINC) tablet, Take 1 tablet by mouth Daily., Disp: , Rfl:   •  Biotin 1 MG capsule,  Oral, Daily, 0 Refill(s), Disp: , Rfl:   •  Cholecalciferol 1000 UNITS capsule, Take 1,000 Units by mouth Daily., Disp: , Rfl:   •  dexlansoprazole (DEXILANT) 60 MG capsule, Take 1 capsule by mouth 2 (Two) Times a Day., Disp: 180 capsule, Rfl: 3  •  EPINEPHrine (EPIPEN) 0.3 MG/0.3ML solution auto-injector injection, 0.3 mg once. Inject intramuscularly as directed, Disp: , Rfl:   •  estradiol (ESTRACE) 0.1 MG/GM vaginal cream, PLACE 1 GRAM VAGINALLY QHS FOR 1 WEEK THEN TWICE WEEKLY, Disp: , Rfl: 1  •  fluticasone (FLONASE) 50 MCG/ACT nasal spray,  "SHAKE WELL AND USE 2 SPRAYS IN EACH NOSTRIL DAILY, Disp: 16 g, Rfl: 11  •  [place]omal, Zingiber officinalis, (KOMAL ROOT PO), Take  by mouth. 1  Cup of tea daily, Disp: , Rfl:   •  hydroCHLOROthiazide (MICROZIDE) 12.5 MG capsule, Take 1 capsule by mouth Daily., Disp: 90 capsule, Rfl: 1  •  lisinopril (PRINIVIL,ZESTRIL) 10 MG tablet, Take 1 tablet by mouth Daily., Disp: 90 tablet, Rfl: 1  •  Omega-3 Fatty Acids (fish oil) 1000 MG capsule capsule,  Oral, Daily, 0 Refill(s), Disp: , Rfl:   •  potassium chloride (K-DUR,KLOR-CON) 20 MEQ CR tablet, Take 1 tablet by mouth Daily., Disp: 90 tablet, Rfl: 1  •  Zinc Sulfate 66 MG tablet, Take  by mouth., Disp: , Rfl:    also entered in Sleep Questionnaire         Vital Signs: /70   Pulse 66   Ht 165.1 cm (65\")   Wt 92.5 kg (204 lb)   SpO2 98%   BMI 33.95 kg/m²     Body mass index is 33.95 kg/m².       Tongue: Large       Dentition: good       Pharynx: Posterior pharyngeal pillars are narrow   Mallampatti: IV (only hard palate visible)        General: Alert. Cooperative. Well developed. No acute distress.             Head:  Normocephalic. Symmetrical. Atraumatic.              Nose: No septal deviation. No drainage.          Throat: No oral lesions. No thrush. Moist mucous membranes.    Chest Wall:  Normal shape. Symmetric expansion with respiration. No tenderness.             Neck:  Trachea midline.           Lungs:  Clear to auscultation bilaterally. No wheezes. No rhonchi. No rales. Respirations regular, even and unlabored.            Heart:  Regular rhythm and normal rate. Normal S1 and S2. No murmur.     Abdomen:  Soft, non-tender and non-distended. Normal bowel sounds. No masses.  Extremities:  Moves all extremities well. No edema.    Psychiatric: Normal mood and affect.    Diagnostic data available to date is as below and was reviewed on current visit:  • 6/6/19  The patient tolerated the home sleep testing with monitoring time of 500 minutes. The data obtained " make this a technically adequate study. The apnea hypopneas index(AHI) was 15.1 per sleep hour.  The AHI during supine position was 19.3 per sleep hour. Mean heart rate of 60.8 BPM.  Snoring was noted 2.4% of sleep time. Lowest oxygen saturation during the study was 82%. Saturation below 89% was noted for 10.7 mins.     Most current available usage data reviewed on 11/11/2022:  · 100% compliance average 9 hours 56 minutes AHI 1.8 average pressure 12.6 auto CPAP 10-15    DME Company: [employer] (device from [employer])    Prescription to DME for replacement supplies as below:    full face mask      Description Replacement    Nasal PILLOWS      A 7034 Nasal Pillows  every 3 mth    A 7033 Repl Nasal Pillows  2 per mth    Nasal MASK/CUSHION      A 7034 Nasal Mask/Cushion  every 3 mth    A 7032 Repl Nasal Mask/Cushion  2 per mth    Full Face MASK     x A 7030 Full Face Mask  every 3 mth   x A 7031 Repl Face Mask  1 per mth      A 4604 Heated Tubing  every 3 mth    A 7037 Standard Tubing  every 3 mth   x A 7035 Headgear  every 3 mth   x A 7046 Repl Humidifier Chamber  every 6 yrs   x A 7038 Disposable Filters  2 per mth   x A 7039 Non-disposable Filter  every 6 mth   x A 7036 Chin Strap  every 6 mth         No orders of the defined types were placed in this encounter.         Impression:  1. Obstructive sleep apnea    2. Obesity (BMI 30-39.9)        Plan:  Kelsey is doing well on current pressures and will continue same.  She may wish to change her style of mask as the current mask causes marks on her face from the straps.  She will continue with [employer] for her supplies.  Her device was set up by [employer].    I reiterated the importance of effective treatment of obstructive sleep apnea with PAP machine.  Cardiovascular health risks of untreated sleep apnea were again reviewed.  Patient was asked to remain cautious if there is persistent hypersomnolence. The benefit of weight loss in reducing severity of  obstructive sleep apnea was discussed.  Patient would benefit from adhering to a strict diet to achieve ideal BMI.     Change of PAP supplies regularly is important for effective use.  Change of cushion on the mask or plugs on nasal pillows along with disposable filters once every month and change of mask frame, tubing, headgear and Velcro straps every 6 months at the minimum was reiterated.    This patient is compliant with PAP machine and benefits from its use.  Apnea hypopneas index is corrected/improved.  Daytime hypersomnolence has resolved.     Patient will follow up in this clinic in 1 year APRN    Thank you for allowing me to participate in your patient's care.    Electronically signed by Hang Mejia MD, 11/11/22, 7:25 AM EST.    Part of this note may be an electronic transcription/translation of spoken language to printed text using the Dragon Dictation System.

## 2023-01-09 ENCOUNTER — HOSPITAL ENCOUNTER (OUTPATIENT)
Dept: GENERAL RADIOLOGY | Facility: HOSPITAL | Age: 68
Discharge: HOME | End: 2023-01-09
Admitting: FAMILY MEDICINE
Payer: COMMERCIAL

## 2023-01-09 ENCOUNTER — OFFICE VISIT (OUTPATIENT)
Dept: INTERNAL MEDICINE | Facility: CLINIC | Age: 68
End: 2023-01-09
Payer: COMMERCIAL

## 2023-01-09 VITALS
TEMPERATURE: 97.5 F | SYSTOLIC BLOOD PRESSURE: 162 MMHG | HEART RATE: 87 BPM | OXYGEN SATURATION: 99 % | DIASTOLIC BLOOD PRESSURE: 90 MMHG | BODY MASS INDEX: 33.99 KG/M2 | HEIGHT: 65 IN | WEIGHT: 204 LBS

## 2023-01-09 DIAGNOSIS — Z00.00 PHYSICAL EXAM, ANNUAL: Primary | ICD-10-CM

## 2023-01-09 DIAGNOSIS — M25.552 BILATERAL HIP PAIN: ICD-10-CM

## 2023-01-09 DIAGNOSIS — M54.2 NECK PAIN: ICD-10-CM

## 2023-01-09 DIAGNOSIS — M25.551 BILATERAL HIP PAIN: ICD-10-CM

## 2023-01-09 DIAGNOSIS — I10 BENIGN ESSENTIAL HYPERTENSION: ICD-10-CM

## 2023-01-09 DIAGNOSIS — R73.01 IFG (IMPAIRED FASTING GLUCOSE): ICD-10-CM

## 2023-01-09 PROBLEM — K76.0 HEPATIC STEATOSIS DETERMINED BY BIOPSY OF LIVER: Status: ACTIVE | Noted: 2023-01-09

## 2023-01-09 PROCEDURE — 73521 X-RAY EXAM HIPS BI 2 VIEWS: CPT

## 2023-01-09 PROCEDURE — 99214 OFFICE O/P EST MOD 30 MIN: CPT | Performed by: FAMILY MEDICINE

## 2023-01-09 PROCEDURE — 99397 PER PM REEVAL EST PAT 65+ YR: CPT | Performed by: FAMILY MEDICINE

## 2023-01-09 PROCEDURE — 90677 PCV20 VACCINE IM: CPT | Performed by: FAMILY MEDICINE

## 2023-01-09 PROCEDURE — 90471 IMMUNIZATION ADMIN: CPT | Performed by: FAMILY MEDICINE

## 2023-01-09 NOTE — PROGRESS NOTES
Subjective   Kelsey Nicholson is a 67 y.o. female.   Chief Complaint   Patient presents with   • Neck Pain   • Hip Pain   • Foot Pain   • Annual Exam   • Hypertension   • Hyperglycemia       History of Present Illness     CPE-patient is here for complete physical exam without GYN evaluation.  She complains of hips pain and neck pain.  Her blood pressure is elevated.    She had pyloroplasty and liver biopsy done in 2022 by Dr. Vasquez.  Biopsy results were positive for liver steatosis.  She was started on Reglan by Dr. Vasquez.  She takes it as needed on average once a month.    Her uncle  from liver cancer in October.  Cousin from breast cancer.  She was not following a good diet during that time.  She was stressed.  She reached out for counseling and it was helpful.    No change in over-the-counter medications.  No new allergies to medications.  She does not use tobacco products, she drinks few drinks a year, no drugs.  She does not exercise regularly.  She has dental appointments every 6 months.  Last eye exam was more than a year ago.  She is scheduled with her GYN in 2023.  She had mammogram done in 2021.    She had colonoscopy in May 2022.  Next was recommended 5 years later due to family history of colon cancer.  COVID-vaccine x5, flu vaccine x1, Pneumovax 2016.  Shingrix x2.  Tetanus vaccine -2013.    Hypertension-she is on lisinopril 10 mg a day and HCTZ 12.5 mg a day.  She follows up with cardiologist.  Creatinine at 0.9, GFR 71.1.  Potassium 3.9.  No chest pain, no shortness of breath, no lightheadedness, no palpitations.    Impaired fasting glucose-fasting blood sugar 96, A1c 6.0 from 6.1.    Hips pain- started about a year ago.  There was no known injury.  It affects both hips but left worse than right.  Pain is constant, sharp, intensity 9 out of 10.  It is worse when walking especially walking stairs.  She occasionally uses Tylenol.  She prefers no medications.    Neck  pain-started few days ago.  There was no known injury.  Pain is worse with turning head to the right.  Intensity 5-6 out of 10.  She tried heat and ice and it did not help much.      The following portions of the patient's history were reviewed and updated as appropriate: allergies, current medications, past family history, past medical history, past social history, past surgical history and problem list.    Review of Systems   Constitutional: Negative for chills and unexpected weight change.   Respiratory: Negative for shortness of breath.    Cardiovascular: Negative for chest pain and palpitations.   Neurological: Negative for light-headedness.   Psychiatric/Behavioral: Negative.          Objective   Wt Readings from Last 3 Encounters:   01/09/23 92.5 kg (204 lb)   11/11/22 92.5 kg (204 lb)   09/09/22 92.3 kg (203 lb 6.4 oz)      Vitals:    01/09/23 1415   BP: 162/90   Pulse: 87   Temp: 97.5 °F (36.4 °C)   SpO2: 99%     Temp Readings from Last 3 Encounters:   01/09/23 97.5 °F (36.4 °C)   02/18/22 97.1 °F (36.2 °C)   09/04/21 98.5 °F (36.9 °C) (Temporal)     BP Readings from Last 3 Encounters:   01/09/23 162/90   11/11/22 145/70   09/09/22 128/78     Pulse Readings from Last 3 Encounters:   01/09/23 87   11/11/22 66   09/09/22 64     Body mass index is 33.95 kg/m².    Physical Exam  Vitals reviewed.   Constitutional:       General: She is not in acute distress.     Appearance: She is well-developed. She is not diaphoretic.   HENT:      Head: Normocephalic and atraumatic. Hair is normal.      Right Ear: Hearing, tympanic membrane, ear canal and external ear normal. No decreased hearing noted. No drainage.      Left Ear: Hearing, tympanic membrane, ear canal and external ear normal. No decreased hearing noted.      Nose: No nasal deformity.   Eyes:      General: Lids are normal.         Right eye: No discharge.         Left eye: No discharge.      Conjunctiva/sclera: Conjunctivae normal.      Pupils: Pupils are  equal, round, and reactive to light.   Neck:      Thyroid: No thyromegaly.      Vascular: No JVD.      Trachea: No tracheal deviation.   Cardiovascular:      Rate and Rhythm: Normal rate and regular rhythm.      Pulses: Normal pulses.      Heart sounds: Normal heart sounds. No murmur heard.    No friction rub. No gallop.   Pulmonary:      Effort: Pulmonary effort is normal. No respiratory distress.      Breath sounds: Normal breath sounds. No wheezing or rales.   Chest:      Chest wall: No tenderness.   Abdominal:      General: There is no distension.      Palpations: Abdomen is soft. There is no mass.      Tenderness: There is no abdominal tenderness. There is no guarding or rebound.      Hernia: No hernia is present.   Musculoskeletal:         General: No tenderness or deformity. Normal range of motion.      Cervical back: Normal range of motion and neck supple.      Comments: No TTP over C-spine or paraspinal muscles.  No TTP over hips.  Limited internal rotation in hips, more on the left.  MM strength 5/5 BL x LE.  SLR negative BL.   Lymphadenopathy:      Cervical: No cervical adenopathy.   Skin:     General: Skin is warm and dry.      Findings: No erythema or rash.   Neurological:      Mental Status: She is alert and oriented to person, place, and time.      Cranial Nerves: No cranial nerve deficit.      Motor: No abnormal muscle tone.      Coordination: Coordination normal.      Deep Tendon Reflexes: Reflexes are normal and symmetric. Reflexes normal.   Psychiatric:         Behavior: Behavior normal.         Thought Content: Thought content normal.         Judgment: Judgment normal.         Assessment & Plan   Diagnoses and all orders for this visit:    1. Physical exam, annual (Primary)    2. Benign essential hypertension    3. IFG (impaired fasting glucose)    4. Bilateral hip pain  -     XR Hips Bilateral With or Without Pelvis 2 View; Future  -     Ambulatory Referral to Physical Therapy    5. Neck pain  -      Ambulatory Referral to Physical Therapy        1. CPE-preventive counseling provided.  Blood work results reviewed with patient.  Obesity increases risk for developing diabetes and heart disease.  Patient just started counting calories last week.  Increase physical activity as tolerated.  Water exercise would be a good choice.  Patient is reminded to schedule eye exam.    She is getting Prevnar 20 today.  Otherwise she is up-to-date with vaccinations.  She is scheduled with her GYN in June 2023.  Living will discussed.  Patient is not ready to name healthcare surrogate today.  She will think about it.    2 .hypertension-uncontrolled.  Increase dose of lisinopril to 20 mg a day.  Patient will take 2 tablets of lisinopril 10.  Continue current dose of HCTZ.  Follow-up in 1 month.  3.  Impaired fasting glucose-information on prediabetic diet provided.  Weight loss will be beneficial.  Follow-up in 6 months.  4.  Hips pain-uncontrolled.  Patient prefers no medications.  We are checking x-ray, I am referring her to physical therapist.  Follow-up in 1 month.  5.  Neck pain- referral to physical therapy.  Follow-up in 1 month.

## 2023-01-30 RX ORDER — LISINOPRIL 10 MG/1
10 TABLET ORAL DAILY
Qty: 90 TABLET | Refills: 1 | Status: SHIPPED | OUTPATIENT
Start: 2023-01-30 | End: 2023-02-10 | Stop reason: SDUPTHER

## 2023-01-30 NOTE — TELEPHONE ENCOUNTER
Caller: Kelsey Nicholson    Relationship: Self    Best call back number: [phone]    Requested Prescriptions:   Requested Prescriptions     Pending Prescriptions Disp Refills   • lisinopril (PRINIVIL,ZESTRIL) 10 MG tablet 90 tablet 1     Sig: Take 1 tablet by mouth Daily.        Pharmacy where request should be sent: [id number] [place] [id number] [address]  AT [facility] [address]  [place] - [phone] [facility] [phone]      Additional details provided by patient: PATIENT IS OUT OF THIS MEDICATION.     Does the patient have less than a 3 day supply:  [x] Yes  [] No    Would you like a call back once the refill request has been completed: [] Yes [x] No    If the office needs to give you a call back, can they leave a voicemail: [] Yes [x] No    Norman Wynn Rep   01/30/23 12:23 EST

## 2023-01-31 RX ORDER — HYDROCHLOROTHIAZIDE 12.5 MG/1
12.5 CAPSULE, GELATIN COATED ORAL DAILY
Qty: 90 CAPSULE | Refills: 1 | Status: SHIPPED | OUTPATIENT
Start: 2023-01-31 | End: 2023-02-17

## 2023-01-31 NOTE — TELEPHONE ENCOUNTER
Caller: DICK BETANCOURT    Relationship: SELF    Best call back number: [phone]    Requested Prescriptions:   Requested Prescriptions     Pending Prescriptions Disp Refills   • hydroCHLOROthiazide (MICROZIDE) 12.5 MG capsule 90 capsule 1     Sig: Take 1 capsule by mouth Daily.        Pharmacy where request should be sent:[facility]      Additional details provided by patient:     Does the patient have less than a 3 day supply:  [x] Yes  [] No    Would you like a call back once the refill request has been completed: [x] Yes [] No    If the office needs to give you a call back, can they leave a voicemail: [x] Yes [] No    Norman Charlton Rep   01/31/23 10:55 EST

## 2023-02-01 ENCOUNTER — TREATMENT (OUTPATIENT)
Dept: PHYSICAL THERAPY | Facility: CLINIC | Age: 68
End: 2023-02-01
Payer: COMMERCIAL

## 2023-02-01 DIAGNOSIS — M54.2 PAIN, NECK: ICD-10-CM

## 2023-02-01 DIAGNOSIS — M25.551 HIP PAIN, BILATERAL: Primary | ICD-10-CM

## 2023-02-01 DIAGNOSIS — M25.552 HIP PAIN, BILATERAL: Primary | ICD-10-CM

## 2023-02-01 PROCEDURE — 97530 THERAPEUTIC ACTIVITIES: CPT | Performed by: PHYSICAL THERAPIST

## 2023-02-01 PROCEDURE — 97161 PT EVAL LOW COMPLEX 20 MIN: CPT | Performed by: PHYSICAL THERAPIST

## 2023-02-01 PROCEDURE — 97110 THERAPEUTIC EXERCISES: CPT | Performed by: PHYSICAL THERAPIST

## 2023-02-01 NOTE — PROGRESS NOTES
Physical Therapy Initial Evaluation and Plan of Care    Patient: Kelsey Nicholson   : 1955  Diagnosis/ICD-10 Code:  Hip pain, bilateral [M25.551, M25.552]  Referring practitioner: Elizabeth Dimas MD  Date of Initial Visit: 2023  Today's Date: 2023  Patient seen for 1         Visit Diagnoses:    ICD-10-CM ICD-9-CM   1. Hip pain, bilateral  M25.551 719.45    M25.552    2. Pain, neck  M54.2 723.1         Subjective Questionnaire: NDI: 30% limitation, WOMAC 64/96      Subjective Evaluation    History of Present Illness  Mechanism of injury: Constand B lateral hip, groin and thigh pain, left side worse than right. Pain with donning socks, bending over to  object off the floor. Decreased walking tolerance because of pain. Pt states she also has sciatica. Denies numbness/tingling in LE's other than in right ankle that has been present since fracture. X-rays showed mild to moderate hip OA bilaterally.     Neck pain, bilateral upper traps. Worsens at night, and with work. Pt states in the early 90's she fell and hit the back of her head on a tree stump. Occasional pain into UE's to elbows. No numbness/tingling in UE's.     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  Quality of life: good    Pain  Pain scale: Hips 7/10 Neck 7/10.  Pain scale at lowest: Hips 3/10 Neck 3/10.  Pain scale at highest: Hips 10/10, Neck 8/10.  Location: B hips, B upper traps  Quality: sharp, dull ache and burning  Relieving factors: ice, heat and medications (Salon pas pain patches; Tylenol PRN)  Aggravating factors: sleeping and keyboarding  Progression: worsening    Social Support  Lives in: one-story house  Lives with: alone    Diagnostic Tests  X-ray: abnormal (B hip OA)    Patient Goals  Patient goals for therapy: decreased pain and independence with ADLs/IADLs  Patient goal: don socks without difficulty, walk for 30 minutes-1  hour and ride a bicycle           Objective          Static Posture     Head  Forward.    Shoulders  Rounded.    Palpation   Left   No palpable tenderness to the lower trapezius, middle trapezius, scalenes and suboccipitals.   Hypertonic in the levator scapulae and upper trapezius.   Tenderness of the cervical paraspinals, levator scapulae and upper trapezius.     Right   No palpable tenderness to the lower trapezius, middle trapezius, scalenes and suboccipitals.   Hypertonic in the levator scapulae and upper trapezius. Tenderness of the cervical paraspinals, levator scapulae and upper trapezius.     Tenderness     Left Hip   Tenderness in the greater trochanter. No tenderness in the ASIS, PSIS and sacroiliac joint.     Right Hip   Tenderness in the greater trochanter. No tenderness in the ASIS, PSIS and sacroiliac joint.     Neurological Testing     Sensation     Hip   Left Hip   Intact: light touch    Right Hip   Intact: light touch    Active Range of Motion   Cervical/Thoracic Spine   Cervical    Flexion: 48 degrees   Extension: 53 degrees   Left lateral flexion: 27 degrees with pain  Right lateral flexion: 26 degrees with pain  Left rotation: 41 degrees with pain  Right rotation: 50 degrees with pain    Additional Active Range of Motion Details  Very limited AROM secondary to pain    Passive Range of Motion   Left Hip   Normal passive range of motion    Right Hip   Normal passive range of motion    Strength/Myotome Testing     Left Shoulder     Planes of Motion   Abduction: 4+   External rotation at 0°: 4+     Isolated Muscles   Lower trapezius: 4-   Middle trapezius: 4-   Serratus anterior: 4-     Right Shoulder     Planes of Motion   Abduction: 4+   External rotation at 0°: 4+     Isolated Muscles   Lower trapezius: 4-   Middle trapezius: 4-   Serratus anterior: 4-     Left Elbow   Flexion: 4+  Extension: 4+    Right Elbow   Flexion: 4+  Extension: 4+    Left Wrist/Hand   Wrist extension: 4+  Wrist flexion:  4+    Right Wrist/Hand   Wrist extension: 4+  Wrist flexion: 4+    Left Hip   Planes of Motion   Flexion: 4  Extension: 4-  Abduction: 4-    Right Hip   Planes of Motion   Flexion: 4  Extension: 4-  Abduction: 4-    Left Knee   Flexion: 5  Extension: 5    Right Knee   Flexion: 5  Extension: 5    Tests   Cervical     Left   Negative active compression (Walton), cervical distraction and Spurling's sign.     Right   Negative active compression (Walton), cervical distraction and Spurling's sign.     Left Hip   Positive CHARLES and scour.   Brenton: Positive.   90/90 SLR: Positive.     Right Hip   Positive CHARLES and scour.   Brenton: Positive.   90/90 SLR: Positive.     Ambulation     Observational Gait   Gait: antalgic   Decreased walking speed and stride length.           Assessment & Plan     Assessment  Impairments: activity intolerance, impaired physical strength, lacks appropriate home exercise program and pain with function  Functional Limitations: lifting, sleeping, walking, uncomfortable because of pain, moving in bed, sitting and standing  Assessment details: Pt presents with neck and B hip pain, core/scapular muscle weakness, difficulty ambulating, and postural dysfunction. She will benefit from skilled PT services in order to address impairments and facilitate return to normal daily activities including ADL's, work and recreational activities.    Prognosis: good    Goals  Plan Goals: Short Term Goals: 6 weeks. Patient will:  1. Be independent with initial HEP  2. Be instructed in posture and body mechanics  3. Demonstrate TrA contraction during exercises in clinic without need for cueing.  4. Demonstrate improved soft tissue extensibility to allow for 10 degree improvement in cervical AROM in all planes.     Long Term Goals: 12 weeks. Patient will:  1. Demonstrate improved Bilateral lower extremity/core/scapular MMT of  >/= 4+/5 to allow for return to recreational/daily activities without increased pain.  2.  Demonstrate normal lower extremity and upper trap/levator scapulae flexibility to allow for walking/ADL's/performance of household tasks without pain.  3. Ambulate with symmetrical gait pattern.   4. WOMAC and NDI improved by 10%.        Plan  Therapy options: will be seen for skilled therapy services  Planned modality interventions: cryotherapy, thermotherapy (hydrocollator packs), TENS, ultrasound and traction  Planned therapy interventions: abdominal trunk stabilization, manual therapy, neuromuscular re-education, body mechanics training, postural training, soft tissue mobilization, spinal/joint mobilization, strengthening, stretching, home exercise program, functional ROM exercises and flexibility  Frequency: 2x week  Duration in weeks: 12  Treatment plan discussed with: patient        History # of Personal Factors and/or Comorbidities: MODERATE (1-2)  Examination of Body System(s): # of elements: MODERATE (3)  Clinical Presentation: EVOLVING  Clinical Decision Making: LOW       Timed:         Manual Therapy:    0     mins  40486;     Therapeutic Exercise:    10     mins  78938;     Neuromuscular Tiki:    0    mins  35426;    Therapeutic Activity:     10     mins  83211;     Gait Trainin     mins  83318;     Ultrasound:     0     mins  37092;    Ionto                               0    mins   26788  Self Care                       0     mins   12152  Canalith Repos    0     mins 00072      Un-Timed:  Electrical Stimulation:    0     mins  12568 (MC );  Dry Needling     0     mins self-pay  Traction     0     mins 00967  Low Eval     30     Mins  41819  Mod Eval     0     Mins  36129  High Eval                       0     Mins  62484        Timed Treatment:   20   mins   Total Treatment:     50   mins          PT: Luiza Maciel PT     License Number: [id number]  Electronically signed by Luiza Maciel PT, 23, 5:03 PM EST    Certification Period: 2023 thru 2023  I certify that the  therapy services are furnished while this patient is under my care.  The services outlined above are required by this patient, and will be reviewed every 90 days.         Physician Signature:__________________________________________________    PHYSICIAN: Elizabeth Dimas MD  NPI: [id number]                                      DATE:      Please sign and return via fax to [phone]. Thank you, [facility].

## 2023-02-10 ENCOUNTER — OFFICE VISIT (OUTPATIENT)
Dept: INTERNAL MEDICINE | Facility: CLINIC | Age: 68
End: 2023-02-10
Payer: COMMERCIAL

## 2023-02-10 VITALS
DIASTOLIC BLOOD PRESSURE: 80 MMHG | SYSTOLIC BLOOD PRESSURE: 130 MMHG | OXYGEN SATURATION: 98 % | TEMPERATURE: 97.2 F | BODY MASS INDEX: 33.99 KG/M2 | WEIGHT: 204 LBS | HEIGHT: 65 IN | HEART RATE: 80 BPM

## 2023-02-10 DIAGNOSIS — I10 BENIGN ESSENTIAL HYPERTENSION: Primary | ICD-10-CM

## 2023-02-10 PROCEDURE — 99213 OFFICE O/P EST LOW 20 MIN: CPT | Performed by: FAMILY MEDICINE

## 2023-02-10 RX ORDER — LISINOPRIL 20 MG/1
20 TABLET ORAL DAILY
Qty: 90 TABLET | Refills: 1 | Status: SHIPPED | OUTPATIENT
Start: 2023-02-10

## 2023-02-10 NOTE — PROGRESS NOTES
Subjective   Kelsey Nicholson is a 67 y.o. female.   Chief Complaint   Patient presents with   • Hypertension       History of Present Illness     1. Hypertension-she is on lisinopril 20 mg a day ( we increased dose at last OV) and HCTZ 12.5 mg a day.  She takes it every day.  She has no side effects.  No lightheadedness.  She has no chest pain, no shortness of breath, no palpitations.  She joined [employer].   She is planning to exercise 5 days a week during lunchtime and will try to do some extra exercise on weekends.    She follows up with cardiologist.    She uses CPAP every night.    At last office visit I referred patient to PT for neck pain and hip pain.  She follows home exercise program.  She finds it helpful.    The following portions of the patient's history were reviewed and updated as appropriate: allergies, current medications, past medical history, past social history and problem list.    Review of Systems   Respiratory: Negative for shortness of breath.    Cardiovascular: Negative for chest pain and palpitations.   Neurological: Negative for light-headedness.         Objective   Wt Readings from Last 3 Encounters:   02/10/23 92.5 kg (204 lb)   01/09/23 92.5 kg (204 lb)   11/11/22 92.5 kg (204 lb)      Vitals:    02/10/23 0722   BP: 130/80   Pulse: 80   Temp: 97.2 °F (36.2 °C)   SpO2: 98%     Temp Readings from Last 3 Encounters:   02/10/23 97.2 °F (36.2 °C)   01/09/23 97.5 °F (36.4 °C)   02/18/22 97.1 °F (36.2 °C)     BP Readings from Last 3 Encounters:   02/10/23 130/80   01/09/23 162/90   11/11/22 145/70     Pulse Readings from Last 3 Encounters:   02/10/23 80   01/09/23 87   11/11/22 66     Body mass index is 33.95 kg/m².    Physical Exam  Constitutional:       Appearance: She is well-developed.   Neck:      Thyroid: No thyromegaly.      Vascular: No carotid bruit.   Cardiovascular:      Rate and Rhythm: Normal rate and regular rhythm.      Heart sounds: Normal heart sounds.   Pulmonary:      Effort:  Pulmonary effort is normal.      Breath sounds: Normal breath sounds.   Skin:     General: Skin is warm and dry.   Neurological:      Mental Status: She is alert and oriented to person, place, and time.   Psychiatric:         Mood and Affect: Mood normal.         Behavior: Behavior normal.         Assessment & Plan   Diagnoses and all orders for this visit:    1. Benign essential hypertension (Primary)    Other orders  -     lisinopril (PRINIVIL,ZESTRIL) 20 MG tablet; Take 1 tablet by mouth Daily.  Dispense: 90 tablet; Refill: 1        1.  Hypertension-continue current treatment.  Follow-up in 6 months.

## 2023-02-14 ENCOUNTER — TELEPHONE (OUTPATIENT)
Dept: CARDIOLOGY | Facility: CLINIC | Age: 68
End: 2023-02-14

## 2023-02-14 NOTE — TELEPHONE ENCOUNTER
Caller: Kelsey Nicholson    Relationship to patient: Self    Best call back number: [phone]    Patient is needing: UNABLE TO WARM TRANSFER, PATIENT CALLED IN STATING SHE WAS HAVING SOME CHEST PAIN THAT SHE BELIEVES COULD BE SIDE EFFECTS FROM HER LISINOPRIL MEDICATION BEING DOUBLED FROM ITS ORIGINAL DOSAGE OR CONTRIBUTED TO INGESTION. SHE IS REQUESTING A CALL BACK.

## 2023-02-15 ENCOUNTER — TELEPHONE (OUTPATIENT)
Dept: INTERNAL MEDICINE | Facility: CLINIC | Age: 68
End: 2023-02-15
Payer: COMMERCIAL

## 2023-02-15 NOTE — TELEPHONE ENCOUNTER
----- Message from Elizabeth Dimas MD sent at 2/14/2023  4:10 PM EST -----  I agree.  Thank you.  ----- Message -----  From: Kenyatta Flores MA  Sent: 2/14/2023   4:09 PM EST  To: Elizabeth Dimas MD    Patient called with chest pain/ heaviness in her chest.  No SOA or pain radiating.  She thought could possibly be GERD currently taking dexilant or increased lisinopril   She called Cardiologist but not one called back.  I advised patient to go to ER.

## 2023-02-15 NOTE — TELEPHONE ENCOUNTER
I called pt to triage situation, however, she reports that she had contacted her PCP last night and they had recommended she go to the ED.  Pt is presently inpatient at [facility] and has a stress test scheduled for today.  I encouraged pt to contact our office once she's discharged to schedule a FU with our office.

## 2023-02-16 ENCOUNTER — TELEPHONE (OUTPATIENT)
Dept: INTERNAL MEDICINE | Facility: CLINIC | Age: 68
End: 2023-02-16

## 2023-02-16 NOTE — TELEPHONE ENCOUNTER
Caller: Kelsey Nicholson    Relationship: Self    Best call back number: [phone]     What is the best time to reach you: ANY    Who are you requesting to speak with (clinical staff, provider,  specific staff member): DR. CARRIZALES'S MA    What was the call regarding: PATIENT WOULD LIKE TO DISCUSS MEDICATION CHANGES AND EMERGENCY ROOM VISIT FOR CHEST PAIN    Do you require a callback: YES

## 2023-02-17 ENCOUNTER — OFFICE VISIT (OUTPATIENT)
Dept: INTERNAL MEDICINE | Facility: CLINIC | Age: 68
End: 2023-02-17
Payer: COMMERCIAL

## 2023-02-17 VITALS
HEIGHT: 65 IN | OXYGEN SATURATION: 100 % | WEIGHT: 204 LBS | BODY MASS INDEX: 33.99 KG/M2 | SYSTOLIC BLOOD PRESSURE: 138 MMHG | DIASTOLIC BLOOD PRESSURE: 80 MMHG | HEART RATE: 75 BPM | TEMPERATURE: 98.1 F

## 2023-02-17 DIAGNOSIS — F41.9 ANXIETY: Primary | ICD-10-CM

## 2023-02-17 PROCEDURE — 99214 OFFICE O/P EST MOD 30 MIN: CPT | Performed by: NURSE PRACTITIONER

## 2023-02-17 RX ORDER — HYDROXYZINE HYDROCHLORIDE 10 MG/1
10 TABLET, FILM COATED ORAL 2 TIMES DAILY PRN
Qty: 40 TABLET | Refills: 1 | Status: SHIPPED | OUTPATIENT
Start: 2023-02-17

## 2023-02-17 RX ORDER — HYDROCHLOROTHIAZIDE 25 MG/1
TABLET ORAL
COMMUNITY
Start: 2023-02-15

## 2023-02-17 NOTE — PROGRESS NOTES
Subjective   Kelsey Nicholson is a 67 y.o. female. Patient is here today for   Chief Complaint   Patient presents with   • Hospital Follow Up Visit   .    History of Present Illness   She was seen at [facility] 2/14-15/2023. Her BP was elevated. Cardiac workup was negative. She hasn't had chest pain since then.    She has had some increased stress at work. She doesn't have a history of anxiety or panic attacks. She hasn't previously been on medication for anxiety.    The following portions of the patient's history were reviewed and updated as appropriate: allergies, current medications, past family history, past medical history, past social history, past surgical history and problem list.    Review of Systems    Objective   Vitals:    02/17/23 0921   BP: 138/80   Pulse: 75   Temp: 98.1 °F (36.7 °C)   SpO2: 100%     Body mass index is 33.95 kg/m².  Physical Exam  Vitals and nursing note reviewed.   Constitutional:       Appearance: Normal appearance. She is well-developed.   Cardiovascular:      Rate and Rhythm: Normal rate and regular rhythm.      Heart sounds: Normal heart sounds.   Pulmonary:      Effort: Pulmonary effort is normal.      Breath sounds: Normal breath sounds.   Skin:     General: Skin is warm and dry.   Neurological:      Mental Status: She is alert and oriented to person, place, and time.   Psychiatric:         Speech: Speech normal.         Behavior: Behavior normal.         Thought Content: Thought content normal.       Reviewed ER notes from 2/14/2023. Stress test and echo were normal. HgbA1C is in the prediabetic range at 6.0%  Cholesterol elevated, but she has had adverse reactions to statins. Patient does see cardiology    Assessment & Plan   Diagnoses and all orders for this visit:    1. Anxiety (Primary)  -     hydrOXYzine (ATARAX) 10 MG tablet; Take 1 tablet by mouth 2 (Two) Times a Day As Needed for Anxiety.  Dispense: 40 tablet; Refill: 1      Anxiety -will be given a  prescription for hydroxyzine to be used twice daily as needed for anxiety.    F/u with Dr. Dimas as previously scheduled or sooner if needed

## 2023-03-11 NOTE — TELEPHONE ENCOUNTER
03/11/23 0034   Patient Observation   Heart Rate (!) 45   Resp 14   SpO2 100 %   Breath Sounds   Right Upper Lobe Clear   Right Middle Lobe Diminished   Right Lower Lobe Diminished   Left Upper Lobe Clear   Left Lower Lobe Diminished   Vent Information   Vent Mode AC/VC   Ventilator Settings   FiO2  70 %   Vt (Set, mL) 600 mL   Resp Rate (Set) 14 bmp   PEEP/CPAP (cmH2O) 5   Pressure Support (cm H2O) 0 cm H2O   Vent Patient Data (Readings)   Vt (Measured) 626 mL   Peak Inspiratory Pressure (cmH2O) 18 cmH2O   Rate Measured 15 br/min   Minute Volume (L/min) 9.27 Liters   Mean Airway Pressure (cmH2O) 9 cmH20   I:E Ratio 1:2.30   Vent Alarm Settings   High Pressure (cmH2O) 45 cmH2O   Low Minute Volume (lpm) 2 L/min   High Minute Volume (lpm) 20 L/min   Low Exhaled Vt (ml) 200 mL   High Exhaled Vt (ml) 1000 mL   RR High (bpm) 40 br/min   Apnea (secs) 20 secs   Additional Respiratoray Assessments   Humidification Source HME   Ambu Bag With Mask At Bedside Yes   Airway Clearance   Suction   (none)   ETT    Placement Date/Time: 03/10/23 1549   Present on Admission/Arrival: No  Placed By: Licensed provider; In surgery  Placement Verified By: Capnometry; Auscultation  Preoxygenation: Yes  Mask Ventilation: Ventilated by mask with oral airway (2)  Technique: . ..    Secured At 24 cm   Measured From Lips   ETT Placement Right   Secured By Commercial tube hanks   Site Assessment Dry   Cuff Pressure 28 cm H2O I would recommend holding Carafate and see how she does.  If this medication is only used short-term.  Continue PPI therapy.  If she would like to discuss further she can arrange a follow-up.

## 2023-04-03 RX ORDER — POTASSIUM CHLORIDE 20 MEQ/1
20 TABLET, EXTENDED RELEASE ORAL DAILY
Qty: 90 TABLET | Refills: 1 | Status: SHIPPED | OUTPATIENT
Start: 2023-04-03

## 2023-04-24 RX ORDER — HYDROCHLOROTHIAZIDE 12.5 MG/1
12.5 CAPSULE, GELATIN COATED ORAL DAILY
Qty: 90 CAPSULE | Refills: 1 | Status: SHIPPED | OUTPATIENT
Start: 2023-04-24

## 2023-06-08 RX ORDER — HYDROCHLOROTHIAZIDE 25 MG/1
TABLET ORAL
Qty: 30 TABLET | OUTPATIENT
Start: 2023-06-08

## 2023-06-08 RX ORDER — HYDROCHLOROTHIAZIDE 25 MG/1
25 TABLET ORAL DAILY
Qty: 90 TABLET | Refills: 0 | Status: SHIPPED | OUTPATIENT
Start: 2023-06-08

## 2023-06-08 NOTE — TELEPHONE ENCOUNTER
Patient called, informing me that when she was in the ER in Feb her HCTZ was increased.  Patient said since then she has been doing good.  Patient said a hospitalist increased her HCTZ and patient is asking that we call in the refill.    LOV: 9/9/22 KH  Next appt: 9/11/23 MKD  Labs 2/15/23: BMP

## 2023-09-01 RX ORDER — HYDROCHLOROTHIAZIDE 25 MG/1
25 TABLET ORAL DAILY
Qty: 90 TABLET | Refills: 0 | Status: SHIPPED | OUTPATIENT
Start: 2023-09-01

## 2023-09-11 ENCOUNTER — OFFICE VISIT (OUTPATIENT)
Dept: CARDIOLOGY | Facility: CLINIC | Age: 68
End: 2023-09-11
Payer: COMMERCIAL

## 2023-09-11 VITALS
DIASTOLIC BLOOD PRESSURE: 88 MMHG | HEIGHT: 65 IN | SYSTOLIC BLOOD PRESSURE: 120 MMHG | BODY MASS INDEX: 34.66 KG/M2 | HEART RATE: 69 BPM | WEIGHT: 208 LBS

## 2023-09-11 DIAGNOSIS — G47.33 OSA (OBSTRUCTIVE SLEEP APNEA): ICD-10-CM

## 2023-09-11 DIAGNOSIS — I34.1 MVP (MITRAL VALVE PROLAPSE): ICD-10-CM

## 2023-09-11 DIAGNOSIS — N28.9 RENAL INSUFFICIENCY: ICD-10-CM

## 2023-09-11 DIAGNOSIS — E78.2 MIXED HYPERLIPIDEMIA: ICD-10-CM

## 2023-09-11 DIAGNOSIS — I10 BENIGN ESSENTIAL HYPERTENSION: Primary | ICD-10-CM

## 2023-09-11 PROCEDURE — 93000 ELECTROCARDIOGRAM COMPLETE: CPT | Performed by: NURSE PRACTITIONER

## 2023-09-11 PROCEDURE — 99214 OFFICE O/P EST MOD 30 MIN: CPT | Performed by: NURSE PRACTITIONER

## 2023-09-11 RX ORDER — LISINOPRIL 20 MG/1
20 TABLET ORAL DAILY
Qty: 90 TABLET | Refills: 1 | OUTPATIENT
Start: 2023-09-11

## 2023-09-11 RX ORDER — FOLIC ACID 1 MG/1
1 TABLET ORAL DAILY
COMMUNITY

## 2023-09-11 RX ORDER — UBIDECARENONE 75 MG
50 CAPSULE ORAL DAILY
COMMUNITY

## 2023-09-11 RX ORDER — LISINOPRIL 20 MG/1
20 TABLET ORAL DAILY
Qty: 90 TABLET | Refills: 3 | Status: SHIPPED | OUTPATIENT
Start: 2023-09-11

## 2023-09-11 RX ORDER — HYDROCHLOROTHIAZIDE 25 MG/1
25 TABLET ORAL DAILY
Qty: 90 TABLET | Refills: 3 | Status: SHIPPED | OUTPATIENT
Start: 2023-09-11

## 2023-09-11 RX ORDER — POTASSIUM CHLORIDE 20 MEQ/1
20 TABLET, EXTENDED RELEASE ORAL DAILY
Qty: 90 TABLET | Refills: 3 | Status: SHIPPED | OUTPATIENT
Start: 2023-09-11

## 2023-09-11 NOTE — PROGRESS NOTES
Date of Office Visit: 2023  Encounter Provider: KRISTAL Duff  Place of Service: [facility] CARDIOLOGY  Patient Name: Kelsey Nicholson  :1955    Chief complaint  Follow-up hypertension, mitral valve prolapse, mitral regurgitation    History of Present Illness  Patient is a 68-year-old female patient of Dr. Blancas.  Past medical history includes hypertension, hyperlipidemia, statin intolerance, GERD, mitral valve prolapse and mitral regurgitation, obesity.     Patient has a distant history of mitral valve prolapse with trivial mitral regurgitation noted on previous JAMES.  Echo 2018 showed normal LV systolic function, grade 1 diastolic dysfunction, structurally normal mitral valve with trivial regurgitation.  Stress echo was negative for ischemia.  In  she fractured her ankle and had plates and screws placed and has had some chronic swelling since that time.  She was seen for preoperative exam 2020 prior to hardware removal for the right ankle.     Interval history  Patient presents today for routine follow-up.  I will visit with her for the first time today and have reviewed her medical record.  Since last visit she is wearing CPAP consistently.  She denies palpitations, shortness of breath, edema, dizziness, chest pain or chest pressure, fatigue, syncope or presyncope.  She is exercising daily at home and at the [facility] with no exertional symptoms. She was seen in the ER in 2023 with acute episode of chest pain which was related to GERD and hypertension.  She was started on Dexilant by GI, HCTZ was increased to 25 mg daily, and lisinopril was increased from 10 mg to 20 mg daily.  She has had no chest pain since this episode.  Blood pressure today is controlled and she reports similar readings at home.    Past Medical History:   Diagnosis Date    Abdominal pain     Cough     Daytime somnolence     GERD (gastroesophageal reflux disease)      Hyperlipidemia     Hyperplastic colon polyp     Hypertension     Irritable bowel syndrome     Mitral valve prolapse 2000    Osteoarthritis     Pain of left thumb     PUD (peptic ulcer disease) 02/23/2020    Pure hypercholesterolemia     Snoring      Past Surgical History:   Procedure Laterality Date    ANKLE SURGERY Right     BREAST BIOPSY      CHOLECYSTECTOMY  12/2013    COLONOSCOPY  10/16/2013    IH, Hyperplastic polyp     COLONOSCOPY  04/25/2018    Diverticulosis in the sigmoid colon and in the descending colon, colon polyp    ENDOSCOPY  10/16/2013    gastritis.      ENDOSCOPY N/A 02/03/2020    Procedure: ESOPHAGOGASTRODUODENOSCOPY WITH BIOPSIES;  Surgeon: Jose Enrique Quesada MD;  Location: [facility] ENDOSCOPY;  Service: Gastroenterology    HYSTERECTOMY      OOPHORECTOMY      PYLOROPLASTY      dr Vasquez    ROTATOR CUFF REPAIR Left 11/2014    UPPER GASTROINTESTINAL ENDOSCOPY  04/25/2018    Acute erosive gastritis    URETHRAL SUSPENSION       Outpatient Medications Prior to Visit   Medication Sig Dispense Refill    Ascorbic Acid (VITAMIN C) 1000 MG tablet Take 1 tablet by mouth Daily.      B Complex-C-E-Zn (B COMPLEX-C-E-ZINC) tablet Take 1 tablet by mouth Daily.      Biotin 1 MG capsule   Oral, Daily, 0 Refill(s)      dexlansoprazole (DEXILANT) 60 MG capsule Take 1 capsule by mouth 2 (Two) Times a Day. 180 capsule 3    EPINEPHrine (EPIPEN) 0.3 MG/0.3ML solution auto-injector injection 0.3 mL 1 (One) Time. Inject intramuscularly as directed      estradiol (ESTRACE) 0.1 MG/GM vaginal cream PLACE 1 GRAM VAGINALLY QHS FOR 1 WEEK THEN TWICE WEEKLY  1    fluticasone (FLONASE) 50 MCG/ACT nasal spray SHAKE WELL AND USE 2 SPRAYS IN EACH NOSTRIL DAILY 16 g 11    Ginger, Zingiber officinalis, (GINGER ROOT PO) Take  by mouth. 1  Cup of tea daily      hydroCHLOROthiazide (HYDRODIURIL) 25 MG tablet TAKE 1 TABLET BY MOUTH DAILY 90 tablet 0    hydrOXYzine (ATARAX) 10 MG tablet Take 1 tablet by mouth 2 (Two) Times a Day As Needed for  "Anxiety. 40 tablet 1    lisinopril (PRINIVIL,ZESTRIL) 20 MG tablet Take 1 tablet by mouth Daily. 90 tablet 1    Omega-3 Fatty Acids (fish oil) 1000 MG capsule capsule   Oral, Daily, 0 Refill(s)      potassium chloride (K-DUR,KLOR-CON) 20 MEQ CR tablet TAKE 1 TABLET BY MOUTH DAILY 90 tablet 1    Cholecalciferol 1000 UNITS capsule Take 1 capsule by mouth Daily.      folic acid (FOLVITE) 1 MG tablet Take 1 tablet by mouth Daily.      vitamin B-12 (CYANOCOBALAMIN) 100 MCG tablet Take 0.5 tablets by mouth Daily.      lisinopril (PRINIVIL,ZESTRIL) 20 MG tablet Take 1 tablet by mouth Daily. 90 tablet 0     No facility-administered medications prior to visit.       Allergies as of 09/11/2023 - Reviewed 09/11/2023   Allergen Reaction Noted    Statins Other (See Comments) 10/20/2015    Oxycodone-acetaminophen Rash 11/28/2016    Sulfa antibiotics Rash 06/23/2014     Social History     Socioeconomic History    Marital status: Single   Tobacco Use    Smoking status: Never    Smokeless tobacco: Never   Substance and Sexual Activity    Alcohol use: No    Drug use: Defer    Sexual activity: Defer     Family History   Problem Relation Age of Onset    Cancer Father     Diabetes Father     Hypertension Father     Stroke Father     Heart disease Father     Liver cancer Paternal Uncle     Breast cancer Maternal Cousin      Review of Systems   Constitutional: Negative for malaise/fatigue.   Cardiovascular:  Negative for chest pain, claudication, dyspnea on exertion, leg swelling, near-syncope, orthopnea, palpitations, paroxysmal nocturnal dyspnea and syncope.   Respiratory:  Negative for shortness of breath.    Neurological:  Negative for brief paralysis, dizziness, headaches and light-headedness.   All other systems reviewed and are negative.     Objective:     Vitals:    09/11/23 0825   BP: 120/88   BP Location: Left arm   Patient Position: Sitting   Cuff Size: Large Adult   Pulse: 69   Weight: 94.3 kg (208 lb)   Height: 165.1 cm (65\") "     Body mass index is 34.61 kg/m².    Vitals reviewed.   Constitutional:       General: Not in acute distress.     Appearance: Well-developed and not in distress. Not diaphoretic.   HENT:      Head: Normocephalic.   Pulmonary:      Effort: Pulmonary effort is normal. No respiratory distress.      Breath sounds: Normal breath sounds. No wheezing. No rhonchi. No rales.   Cardiovascular:      Normal rate. Regular rhythm.      Murmurs: There is no murmur.   Pulses:     Radial: 2+ bilaterally.  Edema:     Peripheral edema absent.   Skin:     General: Skin is warm and dry. There is no cyanosis.      Findings: No rash.   Neurological:      Mental Status: Alert and oriented to person, place, and time.   Psychiatric:         Behavior: Behavior normal. Behavior is cooperative.         Thought Content: Thought content normal.         Judgment: Judgment normal.     Lab Review:     Lab Results   Component Value Date     01/03/2023     02/11/2022    K 3.9 01/03/2023    K 4.3 02/11/2022     01/03/2023     02/11/2022    CO2 30.2 (H) 01/03/2023    CO2 25 02/11/2022    BUN 15 01/03/2023    BUN 9 02/11/2022    CREATININE 0.90 01/03/2023    CREATININE 1.03 (H) 02/11/2022    EGFRIFNONA 58 (L) 02/11/2022    EGFRIFNONA 61 08/06/2021    EGFRIFAFRI 66 02/11/2022    EGFRIFAFRI 74 08/06/2021    GLUCOSE 96 01/03/2023    GLUCOSE 98 02/11/2022    CALCIUM 9.5 01/03/2023    CALCIUM 9.6 02/11/2022    PROTENTOTREF 7.0 01/03/2023    PROTENTOTREF 7.1 02/11/2022    ALBUMIN 4.4 01/03/2023    ALBUMIN 4.3 02/11/2022    BILITOT 0.3 01/03/2023    BILITOT 0.5 02/11/2022    AST 17 01/03/2023    AST 34 02/11/2022    ALT 23 01/03/2023    ALT 36 (H) 02/11/2022     Lab Results   Component Value Date    WBC 8.52 02/15/2023    WBC 9.24 02/14/2023    HGB 13.2 02/15/2023    HGB 13.9 02/14/2023    HCT 39.5 02/15/2023    HCT 42.5 02/14/2023    MCV 90.4 02/15/2023    MCV 92.4 02/14/2023     02/15/2023     02/14/2023     Lab Results    Component Value Date    BNP <10.0 02/14/2023     Lab Results   Component Value Date    TROPONINI <0.010 02/15/2023     Lab Results   Component Value Date    TSH 2.550 07/08/2020    TSH 5.110 (H) 01/08/2020             ECG 12 Lead    Date/Time: 9/11/2023 9:01 AM  Performed by: Debra Dixon APRN  Authorized by: Debra Dixon APRN   Comparison: compared with previous ECG   Similar to previous ECG  Rhythm: sinus rhythm  Rate: normal  BPM: 69  QRS axis: normal  Comments: Similar to prior.  No new ischemic changes      Assessment:       Diagnosis Plan   1. Benign essential hypertension        2. Mixed hyperlipidemia        3. MVP (mitral valve prolapse)        4. Renal insufficiency        5. DERRICK (obstructive sleep apnea)          Plan:       Hypertension: well controlled with current regimen.  Continue the same. She will continue checking BP at home and call if consistently >130/80. Refilled HCTZ, lisinopril and potassium per her request. Labs at [facility] stable with low normal potassium.  Mitral valve prolapse: noted by previous JAMES with trivial regurgitation. Echo in 2018 without prolapse and only trivial regurgitation.  Patient is asymptomatic and no murmur on exam.  Clinically stable. Repeat echo from [facility] showed normal EF and normal mitral valve structure and function.  Obstructive sleep apnea on CPAP. She reports compliance.  Dyslipidemia: PCP managing.  Unfortunately patient has history of significant statin intolerance.  Vascular screening study and CT cardiac calcium score have been recommended to patient, however she has not pursued this.  Discussed this again with her today as well as possible alternate medications (Repatha, Praluent). Asked her to discuss with PCP possible injectable medications for lipids due to her statin intolerance.  History of mild renal insufficiency: PCP following.  Stable on last labs at [facility].  Obesity: As above      Time Spent: I spent 30 minutes caring for  Kelsey on this date of service. This time includes time spent by me in the following activities: preparing for the visit, reviewing tests, performing a medically appropriate examination and/or evaluation, counseling and educating the patient/family/caregiver, ordering medications, tests, or procedures, and documenting information in the medical record.   I spent 1 minutes on the separately reported service of ECG. This time is not included in the time used to support the E/M service also reported today.        Your medication list            Accurate as of September 11, 2023  9:02 AM. If you have any questions, ask your nurse or doctor.                CONTINUE taking these medications        Instructions Last Dose Given Next Dose Due   ascorbic acid 1000 MG tablet  Commonly known as: VITAMIN C      Take 1 tablet by mouth Daily.       b complex-C-E-zinc tablet      Take 1 tablet by mouth Daily.       Biotin 1 MG capsule      Oral, Daily, 0 Refill(s)       dexlansoprazole 60 MG capsule  Commonly known as: DEXILANT      Take 1 capsule by mouth 2 (Two) Times a Day.       EPINEPHrine 0.3 MG/0.3ML solution auto-injector injection  Commonly known as: EPIPEN      0.3 mL 1 (One) Time. Inject intramuscularly as directed       estradiol 0.1 MG/GM vaginal cream  Commonly known as: ESTRACE      PLACE 1 GRAM VAGINALLY QHS FOR 1 WEEK THEN TWICE WEEKLY       fish oil 1000 MG capsule capsule      Oral, Daily, 0 Refill(s)       fluticasone 50 MCG/ACT nasal spray  Commonly known as: FLONASE      SHAKE WELL AND USE 2 SPRAYS IN EACH NOSTRIL DAILY       folic acid 1 MG tablet  Commonly known as: FOLVITE      Take 1 tablet by mouth Daily.       GINGER ROOT PO      Take  by mouth. 1  Cup of tea daily       hydroCHLOROthiazide 25 MG tablet  Commonly known as: HYDRODIURIL      TAKE 1 TABLET BY MOUTH DAILY       hydrOXYzine 10 MG tablet  Commonly known as: ATARAX      Take 1 tablet by mouth 2 (Two) Times a Day As Needed for Anxiety.        lisinopril 20 MG tablet  Commonly known as: PRINIVIL,ZESTRIL      Take 1 tablet by mouth Daily.       potassium chloride 20 MEQ CR tablet  Commonly known as: K-DUR,KLOR-CON      TAKE 1 TABLET BY MOUTH DAILY       vitamin B-12 100 MCG tablet  Commonly known as: CYANOCOBALAMIN      Take 0.5 tablets by mouth Daily.                Patient is no longer taking -.  I corrected the med list to reflect this.  I did not stop these medications.      Dictated utilizing Dragon dictation

## 2023-11-17 ENCOUNTER — OFFICE VISIT (OUTPATIENT)
Dept: SLEEP MEDICINE | Facility: HOSPITAL | Age: 68
End: 2023-11-17
Payer: COMMERCIAL

## 2023-11-17 VITALS
SYSTOLIC BLOOD PRESSURE: 137 MMHG | HEIGHT: 65 IN | OXYGEN SATURATION: 97 % | HEART RATE: 73 BPM | DIASTOLIC BLOOD PRESSURE: 64 MMHG | WEIGHT: 210 LBS | BODY MASS INDEX: 34.99 KG/M2

## 2023-11-17 DIAGNOSIS — G47.33 OBSTRUCTIVE SLEEP APNEA: Primary | ICD-10-CM

## 2023-11-17 DIAGNOSIS — E66.9 OBESITY (BMI 30-39.9): ICD-10-CM

## 2023-11-17 PROCEDURE — G0463 HOSPITAL OUTPT CLINIC VISIT: HCPCS

## 2023-11-17 NOTE — PROGRESS NOTES
[facility]  Telephone: [phone] / Fax: [phone] [place]  Telephone: [phone] / Fax: [phone] Shalonda Ramos    PCP: Elizabeth Dimas MD    Reason for visit: DERRICK f/u    Kelsey Nicholson is a 68 y.o.female  was last seen at [facility] sleep lab in November 2022. She has done very well on the machine so far. Pressures are set at 10-15cm H2O and appear comfortable. She wakes up feeling rested in the morning.  Sleep quality has improved on CPAP and excessive sleepiness/snoring is no longer an issue.  There is no complaint of aerophagia, excessive dry mouth or air leak. Her sleep schedule is 8:15pm- 5:40am. ESS is 0. She uses nasal mask. It fits well.     SH- no tobacco, no alcohol, 1 caffeine per day.    ROS- +GERD, +bloating, rest is negative.    DME [employer]    Current Medications:    Current Outpatient Medications:     Ascorbic Acid (VITAMIN C) 1000 MG tablet, Take 1 tablet by mouth Daily., Disp: , Rfl:     B Complex-C-E-Zn (B COMPLEX-C-E-ZINC) tablet, Take 1 tablet by mouth Daily., Disp: , Rfl:     Biotin 1 MG capsule,  Oral, Daily, 0 Refill(s), Disp: , Rfl:     dexlansoprazole (DEXILANT) 60 MG capsule, Take 1 capsule by mouth 2 (Two) Times a Day., Disp: 180 capsule, Rfl: 3    EPINEPHrine (EPIPEN) 0.3 MG/0.3ML solution auto-injector injection, 0.3 mL 1 (One) Time. Inject intramuscularly as directed, Disp: , Rfl:     estradiol (ESTRACE) 0.1 MG/GM vaginal cream, PLACE 1 GRAM VAGINALLY QHS FOR 1 WEEK THEN TWICE WEEKLY, Disp: , Rfl: 1    fluticasone (FLONASE) 50 MCG/ACT nasal spray, SHAKE WELL AND USE 2 SPRAYS IN EACH NOSTRIL DAILY, Disp: 16 g, Rfl: 11    folic acid (FOLVITE) 1 MG tablet, Take 1 tablet by mouth Daily., Disp: , Rfl:     Jenny, Zingiber officinalis, (JENNY ROOT PO), Take  by mouth. 1  Cup of tea daily, Disp: , Rfl:     hydroCHLOROthiazide (HYDRODIURIL) 25 MG tablet, Take 1 tablet by mouth Daily., Disp: 90 tablet, Rfl: 3    hydrOXYzine (ATARAX) 10 MG tablet, Take 1 tablet  "by mouth 2 (Two) Times a Day As Needed for Anxiety., Disp: 40 tablet, Rfl: 1    lisinopril (PRINIVIL,ZESTRIL) 20 MG tablet, Take 1 tablet by mouth Daily., Disp: 90 tablet, Rfl: 3    Omega-3 Fatty Acids (fish oil) 1000 MG capsule capsule,  Oral, Daily, 0 Refill(s), Disp: , Rfl:     potassium chloride (K-DUR,KLOR-CON) 20 MEQ CR tablet, Take 1 tablet by mouth Daily., Disp: 90 tablet, Rfl: 3    vitamin B-12 (CYANOCOBALAMIN) 100 MCG tablet, Take 0.5 tablets by mouth Daily., Disp: , Rfl:    also entered in Sleep Questionnaire    Patient  has a past medical history of Abdominal pain, Cough, Daytime somnolence, GERD (gastroesophageal reflux disease), Hyperlipidemia, Hyperplastic colon polyp, Hypertension, Irritable bowel syndrome, Mitral valve prolapse (2000), Osteoarthritis, Pain of left thumb, PUD (peptic ulcer disease) (02/23/2020), Pure hypercholesterolemia, and Snoring.    I have reviewed the Past Medical History, Past Surgical History, Social History and Family History.    Vital Signs /64   Pulse 73   Ht 165.1 cm (65\")   Wt 95.3 kg (210 lb)   SpO2 97%   BMI 34.95 kg/m²  Body mass index is 34.95 kg/m².    General Alert and oriented. No acute distress noted   Pharynx/Throat Class IV  Mallampati airway, large tongue, no evidence of redundant lateral pharyngeal tissue. No oral lesions. No thrush. Moist mucous membranes.   Head Normocephalic. Symmetrical. Atraumatic.    Nose No septal deviation. No drainage   Chest Wall Normal shape. Symmetric expansion with respiration. No tenderness.   Neck Trachea midline, no thyromegaly or adenopathy    Lungs Clear to auscultation bilaterally. No wheezes. No rhonchi. No rales. Respirations regular, even and unlabored.   Heart Regular rhythm and normal rate. Normal S1 and S2. No murmur   Abdomen Soft, non-tender and non-distended. Normal bowel sounds. No masses.   Extremities Moves all extremities well. No edema   Psychiatric Normal mood and affect.     Testing:  Download " 8/17/23-11/14/23 100% use with average nightly use of 10 hours and 10 minutes on auto CPAP 10-15cm H2O, avg pr is 12.7cm H2O, leak 24.3 L.min, AHI 1.3/hr.    Study-Diagnostic data available to date is as below and was reviewed on current visit:  6/6/19  The patient tolerated the home sleep testing with monitoring time of 500 minutes. The data obtained make this a technically adequate study. The apnea hypopneas index(AHI) was 15.1 per sleep hour.  The AHI during supine position was 19.3 per sleep hour. Mean heart rate of 60.8 BPM.  Snoring was noted 2.4% of sleep time. Lowest oxygen saturation during the study was 82%. Saturation below 89% was noted for 10.7 mins.        Impression:  1. Obstructive sleep apnea    2. Obesity (BMI 30-39.9)          Plan:  Kelsey uses the CPAP device and benefits from its use in terms of reduction of hypersomnia and snoring.  AHI appears to be within adequate range. I reviewed download report and original sleep study report with the patient. I advised pt to contact us if PAP pressures feel excessive or insufficient. We will keep her on pressure of 10-15cm H2O for now.    Weight loss will be strongly beneficial to reduce the severity of sleep-disordered breathing.  Caution during activities that require prolonged concentration is strongly advised if sleepiness returns.     Follow up with Dr. Mejia in one year    Thank you for allowing me to participate in your patient's care.      KRISTAL Garcia  [facility]  Phone: [phone]      Part of this note may be an electronic transcription/translation of spoken language to printed text using the Dragon Dictation System.

## 2023-12-26 DIAGNOSIS — G47.33 OSA (OBSTRUCTIVE SLEEP APNEA): ICD-10-CM

## 2023-12-26 DIAGNOSIS — I34.1 MVP (MITRAL VALVE PROLAPSE): ICD-10-CM

## 2023-12-26 DIAGNOSIS — E78.2 MIXED HYPERLIPIDEMIA: ICD-10-CM

## 2023-12-26 DIAGNOSIS — I10 BENIGN ESSENTIAL HYPERTENSION: ICD-10-CM

## 2023-12-26 DIAGNOSIS — N28.9 RENAL INSUFFICIENCY: ICD-10-CM

## 2023-12-26 RX ORDER — POTASSIUM CHLORIDE 20 MEQ/1
20 TABLET, EXTENDED RELEASE ORAL DAILY
Qty: 90 TABLET | Refills: 3 | Status: SHIPPED | OUTPATIENT
Start: 2023-12-26

## 2024-02-15 ENCOUNTER — PATIENT ROUNDING (BHMG ONLY) (OUTPATIENT)
Dept: URGENT CARE | Facility: CLINIC | Age: 69
End: 2024-02-15
Payer: COMMERCIAL

## 2024-02-23 ENCOUNTER — TELEPHONE (OUTPATIENT)
Dept: INTERNAL MEDICINE | Facility: CLINIC | Age: 69
End: 2024-02-23
Payer: COMMERCIAL

## 2024-02-23 NOTE — TELEPHONE ENCOUNTER
Caller: Kelsey Nicholson    Relationship to patient: Self    Best call back number: [phone]     Date of positive COVID19 test: 2/15/24    Date of possible COVID19 exposure:     COVID19 symptoms: COUGH, STUFFY NOSE, FATIGUE, WEAK    Date of initial quarantine: 2/15/24    Additional information or concerns: PATIENT STATES THAT SHE WENT TO [facility] AND TESTED COVID POSITIVE ON 2/15/24.  SHE WAS PRESCRIBED PAXLOVID MEDICATION AND HAS FINISHED THIS MEDICATION.      SHE SAYS THAT SHE STILL IS NOT FEELING WELL AND WOULD LIKE A CALLBACK TO DISCUSS NEXT STEPS AND DR. GERRI CARRIZALES'S RECOMMENDATIONS.      What is the patients preferred pharmacy: [id number] [place] [address] AT [facility] [address] - [phone] [facility] [phone] FX

## 2024-03-04 ENCOUNTER — TELEPHONE (OUTPATIENT)
Dept: GASTROENTEROLOGY | Facility: CLINIC | Age: 69
End: 2024-03-04
Payer: COMMERCIAL

## 2024-03-04 ENCOUNTER — TELEPHONE (OUTPATIENT)
Dept: INTERNAL MEDICINE | Facility: CLINIC | Age: 69
End: 2024-03-04
Payer: COMMERCIAL

## 2024-03-04 NOTE — TELEPHONE ENCOUNTER
Caller: Kelsey Nicholson    Relationship: Self    Best call back number: [phone]     Who is your current provider: DR. GOMEZ     Is your current provider offboarding? NO    Who would you like your new provider to be: DR. VAUGHAN     What are your reasons for transferring care: PATIENT STATES THAT SHE DOES WANT TO SEE AN APC, SHE WANTS TO SEE A DOCTOR. SHE IS REQUESTING TO SWITCH TO DR. VAUGHAN SINCE DR. GOMEZ CAN'T SEE HER FOR AN OFFICE VISIT.     Additional notes: PLEASE CALL BACK TO ADVISE WITH SCHEDULING.

## 2024-03-04 NOTE — TELEPHONE ENCOUNTER
Patient called. Advised Dr. Dewitt does not have any openings until May. Patient verb understanding and again expressed she wants to see an MD not an APC.

## 2024-03-04 NOTE — TELEPHONE ENCOUNTER
I gave her the number of Dr Negro. I explained that she would need to see Dr Dimas for evaluation to be referred to gastro.   She did not want to do that. She said she knows that she needs a gastro for previous problems.

## 2024-03-04 NOTE — TELEPHONE ENCOUNTER
Returned patient's phone call. She states she is having left upper quadrant pain and bloating. Advised patient Dr. Quesada does not have any office openings at this time. Offered an appointment with Alayna, patient declined and states she will call back.

## 2024-03-04 NOTE — TELEPHONE ENCOUNTER
Caller: Kelsey Nicholson    Relationship: Self    Best call back number: [phone]     What is the best time to reach you: ANYTIME    Who are you requesting to speak with (clinical staff, provider,  specific staff member): ALL    What was the call regarding: PATIENT IS REQUESTING TO KNOW WHO DR CARRIZALES WOULD RECOMMEND SHE SEE FOR GASTROENTEROLOGY.    PATIENT STATED SHE IS EXPERIENCING BLOATING, PAIN IN LEFT SIDE, AND A HARD LUMP ABOVE HER NAVEL.    PATIENT STATED SHE IS UNABLE TO SEE HER CURRENT GASTROENTEROLOGIST UNTIL SUMMER AND WOULD LIKE TO BE SEEN SOONER.    PLEASE CALL TO DISCUSS AND ADVISE.

## 2024-03-04 NOTE — TELEPHONE ENCOUNTER
Caller: Kelsey Nicholson    Relationship to patient: Self    Best call back number: [phone]     Chief complaint: PATIENT HAS BLOATING AND UPPER LEFT SIDE ABDOMINAL PAIN  SINCE 3/1/24.  SHE DOES NOT WANT TO SEE HIS PA BUT WOULD RATHER SEE DR. GOMEZ.    Type of visit: FOLLOW UP     Requested date: ASAP     If rescheduling, when is the original appointment:      Additional notes: PLEASE CALL PATIENT TO ADVISE AND SCHEDULE.

## 2024-03-15 NOTE — PROGRESS NOTES
Physical Therapy Re-Assessment / Re-Certification      Patient: Kelsey Nicholson   : 1955  Diagnosis/ICD-10 Code:  Acute right ankle pain [M25.571]  Referring practitioner: Anoop Trinh MD  Date of Initial Visit: Type: THERAPY  Noted: 2019  Today's Date: 2019  Patient seen for 15 sessions      Subjective:   Kelsey Nicholson reports:   Subjective Questionnaire: LEFS: 42/80  Clinical Progress: improved  Home Program Compliance: Yes  Treatment has included: therapeutic exercise, manual therapy, electrical stimulation and cryotherapy    Subjective Evaluation    Pain  At best pain rating: 3  At worst pain ratin       Patient reports her ankle and foot continue to be very painful and stiff.  States she has been doing her home exercises and using ice.  States she is still having a lot of burning and itching in the incision and across the front of her ankle.      Objective       Active Range of Motion     Right Ankle/Foot   Dorsiflexion (ke): 0 degrees   Dorsiflexion (kf): 5 degrees   Plantar flexion: 35 degrees   Inversion: 12 degrees   Eversion: 6 degrees     Strength/Myotome Testing     Right Ankle/Foot   Dorsiflexion: 4  Plantar flexion: 3  Inversion: 4-  Eversion: 3     Assessment/Plan   Patient presents with improved ankle ROM and strength although limitations persist.  She has improved soft tissue/scar mobility.  TTP fluctuates in intensity but is consistent in the lateral ankle, incision area and anterior aspect of the ankle.  She is progressing with therapy interventions however the progress is slow and being limited by her continued high pain levels.  She will benefit from continued PT.  Progress toward previous goals: Partially Met    Goal Review  Short Term Goals (3 wks):  1.  Patient will have increased ankle DF to 10 degrees and PF to 40 degrees.  2.  Patient will have increased ankle Inversion to 30 degrees and Eversion to 10 degrees.  3.  Patient will have decreased pain to  0/10 to allow for increased comfort with functional activities and allow for improved restorative sleep.  4.  Patient will have increased LE muscle flexibility to WNLs.  5.  Patient will have ankle and foot joint segmental mobility WNLs.    Long Term Goals (4 wks):  1.  Patient will have increased ankle strength to 5/5.  2.  Patient will have improved LEFS score of 60/80 or higher.  3.  Patient will be independent in performance of HEP for carryover upon discharge from skilled PT services.  4.  Patient will have normalized gait without AD.  5.  Patient will report return to performance of ADLs/IADLs/Work/Leisure activities with minimal to no symptom reproduction/pain limitations.      Recommendations: Continue as planned  Timeframe: 1 month  Prognosis to achieve goals: good    PT Signature: Kenyatta Green, PT      Based upon review of the patient's progress and continued therapy plan, it is my medical opinion that Kelsey Nicholson should continue physical therapy treatment at [facility]  [address] [place] [id number] [phone].    Signature: __________________________________  Anoop Trinh MD    Manual Therapy:    10     mins  78442;  Therapeutic Exercise:    25     mins  96596;     Neuromuscular Tiki:        mins  04435;    Therapeutic Activity:          mins  49792;     Gait Training:           mins  31609;     Ultrasound:          mins  19631;    Electrical Stimulation:    15     mins  45516 ( );  Dry Needling          mins self-pay    Timed Treatment:   35   mins   Total Treatment:     50   mins                       no

## 2024-05-28 ENCOUNTER — TELEPHONE (OUTPATIENT)
Dept: CARDIOLOGY | Facility: CLINIC | Age: 69
End: 2024-05-28
Payer: COMMERCIAL

## 2024-05-28 NOTE — TELEPHONE ENCOUNTER
Caller: Kelsey Nicholson    Relationship to patient: Self    Best call back number: [phone]    Chief complaint: HIP REPLACEMENT    Type of visit: CARDIAC CLEARANCE - FOLLOW UP    Requested date: BEFORE 6-22-24  IF AVAILABLE, PATIENT WOULD LIKE TO COME IN ON JUNE 17TH OR 19TH

## 2024-06-12 ENCOUNTER — OFFICE VISIT (OUTPATIENT)
Dept: CARDIOLOGY | Facility: CLINIC | Age: 69
End: 2024-06-12
Payer: COMMERCIAL

## 2024-06-12 VITALS
SYSTOLIC BLOOD PRESSURE: 120 MMHG | WEIGHT: 210 LBS | HEIGHT: 65 IN | OXYGEN SATURATION: 97 % | BODY MASS INDEX: 34.99 KG/M2 | DIASTOLIC BLOOD PRESSURE: 86 MMHG | RESPIRATION RATE: 20 BRPM | HEART RATE: 74 BPM

## 2024-06-12 DIAGNOSIS — E78.2 MIXED HYPERLIPIDEMIA: ICD-10-CM

## 2024-06-12 DIAGNOSIS — I34.1 MVP (MITRAL VALVE PROLAPSE): ICD-10-CM

## 2024-06-12 DIAGNOSIS — I10 BENIGN ESSENTIAL HYPERTENSION: ICD-10-CM

## 2024-06-12 DIAGNOSIS — N28.9 RENAL INSUFFICIENCY: ICD-10-CM

## 2024-06-12 DIAGNOSIS — G47.33 OSA (OBSTRUCTIVE SLEEP APNEA): ICD-10-CM

## 2024-06-12 PROCEDURE — 93000 ELECTROCARDIOGRAM COMPLETE: CPT | Performed by: INTERNAL MEDICINE

## 2024-06-12 PROCEDURE — 99214 OFFICE O/P EST MOD 30 MIN: CPT | Performed by: INTERNAL MEDICINE

## 2024-06-12 RX ORDER — POTASSIUM CHLORIDE 20 MEQ/1
20 TABLET, EXTENDED RELEASE ORAL 2 TIMES DAILY
Qty: 180 TABLET | Refills: 3 | Status: SHIPPED | OUTPATIENT
Start: 2024-06-12

## 2024-06-12 RX ORDER — ONDANSETRON 4 MG/1
4 TABLET, ORALLY DISINTEGRATING ORAL EVERY 8 HOURS PRN
COMMUNITY
End: 2024-06-12 | Stop reason: ALTCHOICE

## 2024-06-12 NOTE — PROGRESS NOTES
Date of Office Visit: 2024  Encounter Provider: Kaylah Blancas MD  Place of Service: [facility] CARDIOLOGY  Patient Name: Kelsey Nicholson  :1955    Chief complaint  MVP with mild MR    History of Present Illness  Patient is a 69-year-old female with history of hyperlipidemia, hypertension, GE reflux.  She has a distant  questionable history of mitral valve prolapse with trivial mitral regurgitation.  In 2023 patient had an echocardiogram that showed normal systolic function with no significant valvular dysfunction stress perfusion study was negative for ischemia.    She is scheduled for left hip arthroplasty in 2024.  She denies any chest pain shortness of breath palpitations syncope near syncope.  She is using CPAP consistently.      Past Medical History:   Diagnosis Date    Abdominal pain     Cough     Daytime somnolence     GERD (gastroesophageal reflux disease)     Hyperlipidemia     Hyperplastic colon polyp     Hypertension     Irritable bowel syndrome     Mitral valve prolapse     Osteoarthritis     Pain of left thumb     PUD (peptic ulcer disease) 2020    Pure hypercholesterolemia     Snoring      Past Surgical History:   Procedure Laterality Date    ANKLE SURGERY Right     BREAST BIOPSY      CHOLECYSTECTOMY  2013    COLONOSCOPY  10/16/2013    IH, Hyperplastic polyp     COLONOSCOPY  2018    Diverticulosis in the sigmoid colon and in the descending colon, colon polyp    ENDOSCOPY  10/16/2013    gastritis.      ENDOSCOPY N/A 2020    Procedure: ESOPHAGOGASTRODUODENOSCOPY WITH BIOPSIES;  Surgeon: Jose Enrique Quesada MD;  Location: [facility] ENDOSCOPY;  Service: Gastroenterology    HYSTERECTOMY      OOPHORECTOMY      PYLOROPLASTY      dr Vasquez    ROTATOR CUFF REPAIR Left 2014    UPPER GASTROINTESTINAL ENDOSCOPY  2018    Acute erosive gastritis    URETHRAL SUSPENSION       Outpatient Medications Prior to Visit   Medication Sig Dispense  Refill    Ascorbic Acid (VITAMIN C) 1000 MG tablet Take 1 tablet by mouth Daily.      B Complex-C-E-Zn (B COMPLEX-C-E-ZINC) tablet Take 1 tablet by mouth Daily.      Biotin 1 MG capsule   Oral, Daily, 0 Refill(s)      estradiol (ESTRACE) 0.1 MG/GM vaginal cream PLACE 1 GRAM VAGINALLY QHS FOR 1 WEEK THEN TWICE WEEKLY  1    fluticasone (FLONASE) 50 MCG/ACT nasal spray SHAKE WELL AND USE 2 SPRAYS IN EACH NOSTRIL DAILY 16 g 11    folic acid (FOLVITE) 1 MG tablet Take 1 tablet by mouth Daily.      hydroCHLOROthiazide (HYDRODIURIL) 25 MG tablet Take 1 tablet by mouth Daily. 90 tablet 3    lisinopril (PRINIVIL,ZESTRIL) 20 MG tablet Take 1 tablet by mouth Daily. 90 tablet 3    Omega-3 Fatty Acids (fish oil) 1000 MG capsule capsule   Oral, Daily, 0 Refill(s)      vitamin B-12 (CYANOCOBALAMIN) 100 MCG tablet Take 0.5 tablets by mouth Daily.      Ginger, Zingiber officinalis, (GINGER ROOT PO) Take  by mouth. 1  Cup of tea daily      potassium chloride (K-DUR,KLOR-CON) 20 MEQ CR tablet TAKE 1 TABLET BY MOUTH EVERY DAY (Patient taking differently: Take 1 tablet by mouth 2 (Two) Times a Day.) 90 tablet 3    EPINEPHrine (EPIPEN) 0.3 MG/0.3ML solution auto-injector injection 0.3 mL 1 (One) Time. Inject intramuscularly as directed      dexlansoprazole (DEXILANT) 60 MG capsule Take 1 capsule by mouth 2 (Two) Times a Day. (Patient not taking: Reported on 6/12/2024) 180 capsule 3    hydrOXYzine (ATARAX) 10 MG tablet Take 1 tablet by mouth 2 (Two) Times a Day As Needed for Anxiety. (Patient not taking: Reported on 6/12/2024) 40 tablet 1    ondansetron ODT (ZOFRAN-ODT) 4 MG disintegrating tablet Place 1 tablet on the tongue Every 8 (Eight) Hours As Needed. (Patient not taking: Reported on 6/12/2024)      promethazine-dextromethorphan (PROMETHAZINE-DM) 6.25-15 MG/5ML syrup Take 5 mL by mouth 4 (Four) Times a Day As Needed for Cough. (Patient not taking: Reported on 6/12/2024) 180 mL 0     No facility-administered medications prior to  "visit.       Allergies as of 06/12/2024 - Reviewed 06/12/2024   Allergen Reaction Noted    Statins Other (See Comments) 10/20/2015    Oxycodone-acetaminophen Rash 11/28/2016    Sulfa antibiotics Rash 06/23/2014     Social History     Socioeconomic History    Marital status: Single   Tobacco Use    Smoking status: Never    Smokeless tobacco: Never   Vaping Use    Vaping status: Never Used   Substance and Sexual Activity    Alcohol use: No    Drug use: Defer    Sexual activity: Defer     Family History   Problem Relation Age of Onset    Cancer Father     Diabetes Father     Hypertension Father     Stroke Father     Heart disease Father     Liver cancer Paternal Uncle     Breast cancer Maternal Cousin      Review of Systems   Constitutional: Negative for chills, fever, weight gain and weight loss.   Cardiovascular:  Negative for leg swelling.   Respiratory:  Negative for cough, snoring and wheezing.    Hematologic/Lymphatic: Negative for bleeding problem. Does not bruise/bleed easily.   Skin:  Negative for color change.   Musculoskeletal:  Negative for falls, joint pain and myalgias.   Gastrointestinal:  Negative for melena.   Genitourinary:  Negative for hematuria.   Neurological:  Negative for excessive daytime sleepiness.   Psychiatric/Behavioral:  Negative for depression. The patient is not nervous/anxious.         Objective:     Vitals:    06/12/24 1006   BP: 120/86   BP Location: Right arm   Patient Position: Sitting   Cuff Size: Adult   Pulse: 74   Resp: 20   SpO2: 97%   Weight: 95.3 kg (210 lb)   Height: 165.1 cm (65\")     Body mass index is 34.95 kg/m².    Vitals reviewed.   Constitutional:       Appearance: Well-developed.   Eyes:      General: No scleral icterus.        Right eye: No discharge.      Conjunctiva/sclera: Conjunctivae normal.      Pupils: Pupils are equal, round, and reactive to light.   HENT:      Head: Normocephalic.      Nose: Nose normal.   Neck:      Thyroid: No thyromegaly.      Vascular: " No JVD.   Pulmonary:      Effort: Pulmonary effort is normal. No respiratory distress.      Breath sounds: Normal breath sounds. No wheezing. No rales.   Cardiovascular:      Normal rate. Regular rhythm. Normal S1. Normal S2.       Murmurs: There is no murmur.      No gallop.    Pulses:     Intact distal pulses.      Carotid: 2+ bilaterally.     Radial: 2+ bilaterally.     Femoral: 2+ bilaterally.     Popliteal: 2+ bilaterally.     Dorsalis pedis: 2+ bilaterally.     Posterior tibial: 2+ bilaterally.  Edema:     Peripheral edema absent.   Abdominal:      General: Bowel sounds are normal. There is no distension.      Palpations: Abdomen is soft.      Tenderness: There is no abdominal tenderness. There is no rebound.   Musculoskeletal: Normal range of motion.         General: No tenderness.      Cervical back: Normal range of motion and neck supple. Skin:     General: Skin is warm and dry.      Findings: No erythema or rash.   Neurological:      Mental Status: Alert and oriented to person, place, and time.   Psychiatric:         Behavior: Behavior normal.         Thought Content: Thought content normal.         Judgment: Judgment normal.       Lab Review:   Lab Results - Last 18 Months   Lab Units 06/08/24  0926 02/15/23  0331   WBC 10*3/uL 9.11 8.52   RBC 10*6/uL 4.56 4.37   HEMOGLOBIN g/dL 13.7 13.2   HEMATOCRIT % 42.2 39.5   MCV fL 92.5 90.4   MCH pg 30.0 30.2   MCHC g/dL 32.5 33.4   RDW % 13.3 13.1   PLATELETS 10*3/uL 335 330   NEUTROPHIL % % 52.5 49.5   LYMPHOCYTE % % 41.1 41.3   MONOCYTES % % 4.1 6.2   EOSINOPHIL % % 1.6 2.3   BASOPHIL % % 0.4 0.5   NEUTROS ABS 10*3/uL 4.78 4.21   LYMPHS ABS 10*3/uL 3.74 3.52   MONOS ABS 10*3/uL 0.37 0.53   EOS ABS 10*3/uL 0.15 0.20   BASOS ABS 10*3/uL 0.04 0.04   MPV fL 11.5 11.3       Lab Results - Last 18 Months   Lab Units 02/15/23  0331 02/14/23  1729 01/03/23  0731   GLUCOSE mg/dL  --   --  96   BUN mg/dL 12 10 15   CREATININE mg/dL 0.84 0.86 0.90   SODIUM mmol/L 144 140  143   POTASSIUM mmol/L 3.7 3.3* 3.9   CHLORIDE mmol/L 107 105 101   TOTAL CO2 mmol/L 23 24  --    CO2 mmol/L  --   --  30.2*   CALCIUM mg/dL 9.1 9.4 9.5   TOTAL PROTEIN g/dL  --  7.8  --    ALBUMIN g/dL  --  4.1 4.4   ALT (SGPT) U/L  --  24 23   AST (SGOT) U/L  --  20 17   ALK PHOS U/L  --  91 94   BILIRUBIN mg/dL  --  0.2 0.3   GLOBULIN g/dL  --  3.7  --    BUN / CREAT RATIO RATIO 14.3 11.6 16.7   ANION GAP (arb'U) 14* 11  --        Lab Results - Last 18 Months   Lab Units 02/14/23  1729   PROTIME s 11.3   APTT s 30.5         ECG 12 Lead    Date/Time: 6/12/2024 10:45 AM  Performed by: Kaylah Blancas MD    Authorized by: Kaylah Blancas MD  Comparison: compared with previous ECG   Similar to previous ECG  Rhythm: sinus rhythm  Conduction: non-specific intraventricular conduction delay        Assessment:       Diagnosis Plan   1. Benign essential hypertension  ECG 12 Lead    potassium chloride (KLOR-CON M20) 20 MEQ CR tablet      2. Mixed hyperlipidemia  ECG 12 Lead    potassium chloride (KLOR-CON M20) 20 MEQ CR tablet      3. MVP (mitral valve prolapse)  potassium chloride (KLOR-CON M20) 20 MEQ CR tablet      4. Renal insufficiency  potassium chloride (KLOR-CON M20) 20 MEQ CR tablet      5. DERRICK (obstructive sleep apnea)  potassium chloride (KLOR-CON M20) 20 MEQ CR tablet        Plan:       1.   2.  Mitral valve prolapse (noted by JAMES) with mild mitral regurgitation. A long time since last study 2018 though not evident on subsequent echocardiograms.  Clinically stable.  2.  Hypertension.  Controlled  3.  Obstructive sleep apnea. On CPAP nad followed by sleep medicine  4.  Dyslipidemia.  Once again reviewed with her she is likely candidate for Repatha.  She will discuss this further with Dr. Tripathi.  5.  Gastroparesis  6.  Upcoming left hip arthroplasty.  She has no ischemia or evidence of heart failure.  Okay to proceed from a cardiac standpoint.    Time Spent: I spent 35 minutes caring for Kelsey on this date of  service. This time includes time spent by me in the following activities: preparing for the visit, reviewing tests, obtaining and/or reviewing a separately obtained history, performing a medically appropriate examination and/or evaluation, counseling and educating the patient/family/caregiver, ordering medications, tests, or procedures, documenting information in the medical record, and independently interpreting results and communicating that information with the patient/family/caregiver.   I spent 1 minutes on the separately reported service of ECG. This time is not included in the time used to support the E/M service also reported today.        Your medication list            Accurate as of June 12, 2024 11:59 PM. If you have any questions, ask your nurse or doctor.                CONTINUE taking these medications        Instructions Last Dose Given Next Dose Due   ascorbic acid 1000 MG tablet  Commonly known as: VITAMIN C      Take 1 tablet by mouth Daily.       b complex-C-E-zinc tablet      Take 1 tablet by mouth Daily.       Biotin 1 MG capsule      Oral, Daily, 0 Refill(s)       EPINEPHrine 0.3 MG/0.3ML solution auto-injector injection  Commonly known as: EPIPEN      0.3 mL 1 (One) Time. Inject intramuscularly as directed       estradiol 0.1 MG/GM vaginal cream  Commonly known as: ESTRACE      PLACE 1 GRAM VAGINALLY QHS FOR 1 WEEK THEN TWICE WEEKLY       fish oil 1000 MG capsule capsule      Oral, Daily, 0 Refill(s)       fluticasone 50 MCG/ACT nasal spray  Commonly known as: FLONASE      SHAKE WELL AND USE 2 SPRAYS IN EACH NOSTRIL DAILY       folic acid 1 MG tablet  Commonly known as: FOLVITE      Take 1 tablet by mouth Daily.       hydroCHLOROthiazide 25 MG tablet      Take 1 tablet by mouth Daily.       lisinopril 20 MG tablet  Commonly known as: PRINIVIL,ZESTRIL      Take 1 tablet by mouth Daily.       potassium chloride 20 MEQ CR tablet  Commonly known as: KLOR-CON M20      Take 1 tablet by mouth 2  (Two) Times a Day.       vitamin B-12 100 MCG tablet  Commonly known as: CYANOCOBALAMIN      Take 0.5 tablets by mouth Daily.                 Where to Get Your Medications        These medications were sent to [id number] - [place], [place] - [address] AT [facility] [address] - [phone]  - [phone] FX  [address], [facility] [address]      Phone: [phone]   potassium chloride 20 MEQ CR tablet         Patient is no longer taking -.  I corrected the med list to reflect this.  I did not stop these medications.      Dictated utilizing Dragon dictation

## 2024-06-17 ENCOUNTER — OFFICE VISIT (OUTPATIENT)
Dept: INTERNAL MEDICINE | Facility: CLINIC | Age: 69
End: 2024-06-17
Payer: COMMERCIAL

## 2024-06-17 VITALS
OXYGEN SATURATION: 98 % | SYSTOLIC BLOOD PRESSURE: 122 MMHG | TEMPERATURE: 97.5 F | HEART RATE: 71 BPM | HEIGHT: 65 IN | DIASTOLIC BLOOD PRESSURE: 80 MMHG | WEIGHT: 208 LBS | BODY MASS INDEX: 34.66 KG/M2

## 2024-06-17 DIAGNOSIS — M16.12 PRIMARY OSTEOARTHRITIS OF LEFT HIP: Primary | ICD-10-CM

## 2024-06-17 DIAGNOSIS — E87.6 HYPOKALEMIA: ICD-10-CM

## 2024-06-17 PROCEDURE — 99213 OFFICE O/P EST LOW 20 MIN: CPT | Performed by: FAMILY MEDICINE

## 2024-06-17 NOTE — PROGRESS NOTES
Subjective   Kelsey Nicholson is a 69 y.o. female.   Chief Complaint   Patient presents with    Pre-op Exam       History of Present Illness     Left hip arthroplasty-the patient is scheduled for left total hip arthroplasty on 7/8/2024.  She is here for surgery clearance.  She was cleared for surgery by her cardiologist last week.  She reports no chest pain, no shortness of breath, no palpitations, no lightheadedness.  She has no history of asthma.  She does not use tobacco products.  She never did.  She has no cough, no wheezing.  There is no history of MRSA.  No history of complications from anesthesia or surgeries.  She completed blood work - CBC, CMP, A1c and UA.  Potassium was low at 3.1.  She was on potassium at 20 mEq a day.  She doubled dose on 6/8/2024.  She takes no NSAIDs.  No aspirin.    Review of Systems   Constitutional:  Negative for chills and fever.   Respiratory:  Negative for cough, shortness of breath and wheezing.    Cardiovascular: Negative.  Negative for chest pain and palpitations.   Gastrointestinal:  Negative for blood in stool and diarrhea.   Genitourinary:  Negative for dysuria, hematuria and urgency.   Skin:  Negative for rash and wound.   Neurological:  Negative for light-headedness.         Objective   Wt Readings from Last 3 Encounters:   06/17/24 94.3 kg (208 lb)   06/12/24 95.3 kg (210 lb)   11/17/23 95.3 kg (210 lb)      Vitals:    06/17/24 1303   BP: 122/80   Pulse: 71   Temp: 97.5 °F (36.4 °C)   SpO2: 98%     Temp Readings from Last 3 Encounters:   06/17/24 97.5 °F (36.4 °C)   02/15/24 98.4 °F (36.9 °C) (Temporal)   02/12/24 97.9 °F (36.6 °C) (Temporal)     BP Readings from Last 3 Encounters:   06/17/24 122/80   06/12/24 120/86   02/15/24 148/87     Pulse Readings from Last 3 Encounters:   06/17/24 71   06/12/24 74   02/15/24 75     Body mass index is 34.61 kg/m².    Physical Exam  Constitutional:       Appearance: She is well-developed.   Neck:      Thyroid: No thyromegaly.       Vascular: No carotid bruit.   Cardiovascular:      Rate and Rhythm: Normal rate and regular rhythm.      Heart sounds: Normal heart sounds.   Pulmonary:      Effort: Pulmonary effort is normal.      Breath sounds: Normal breath sounds.   Abdominal:      General: There is no distension.      Palpations: Abdomen is soft. There is no mass.      Tenderness: There is no abdominal tenderness.   Skin:     General: Skin is warm and dry.   Neurological:      Mental Status: She is alert and oriented to person, place, and time.   Psychiatric:         Behavior: Behavior normal.         Assessment & Plan   Diagnoses and all orders for this visit:    1. Primary osteoarthritis of left hip (Primary)    2. Hypokalemia        Left hip arthroplasty-blood work results reviewed. She is released for surgery.  Hypokalemia-Patient is on potassium 20 mEq BID for 9 days.  We are checking potassium today.

## 2024-06-18 LAB — POTASSIUM SERPL-SCNC: 3.6 MMOL/L (ref 3.5–5.2)

## 2024-07-22 ENCOUNTER — TREATMENT (OUTPATIENT)
Dept: PHYSICAL THERAPY | Facility: CLINIC | Age: 69
End: 2024-07-22
Payer: COMMERCIAL

## 2024-07-22 DIAGNOSIS — G89.18 POST-OP PAIN: ICD-10-CM

## 2024-07-22 DIAGNOSIS — Z96.649 S/P REVISION OF TOTAL HIP: Primary | ICD-10-CM

## 2024-07-22 DIAGNOSIS — M62.81 GENERALIZED MUSCLE WEAKNESS: ICD-10-CM

## 2024-07-22 DIAGNOSIS — R26.89 ANTALGIC GAIT: ICD-10-CM

## 2024-07-22 NOTE — PATIENT INSTRUCTIONS
Pt was educated regarding relevant anatomy/physiology and plan of care.      Access Code: [email or web address]  URL: [email or web address]  Date: 02/01/2023  Prepared by: Luiza Maciel    Exercises  Supine Quadricep Sets - 1 x daily - 1 sets - 15 reps - 5 hold  Supine Gluteal Sets - 1 x daily - 1 sets - 15 reps - 5 hold  Bent Knee Fallouts - 1 x daily - 1 sets - 15 reps  Seated Scapular Retraction - 1 x daily - 1 sets - 15 reps - 5 hold  Seated Gentle Upper Trapezius Stretch - 1 x daily - 4 reps - 15 hold  Gentle Levator Scapulae Stretch - 1 x daily - 4 reps - 15 hold    
97

## 2024-07-22 NOTE — PROGRESS NOTES
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 S/P revision of total hip  Z96.649 V43.64   2. Post-op pain  G89.18 338.18   3. Antalgic gait  R26.89 781.2   4. Generalized muscle weakness  M62.81 728.87         Subjective Questionnaire: WOMAC: 67/96      Subjective Evaluation    History of Present Illness  Date of surgery: 2024  Mechanism of injury: L RADHA two weeks ago on 24. Pt reports she has good days and bad days. Primary complaints are weakness and exhaustion. Ices a lot and is taking pain meds. Swelling L hip and pain at L knee/thigh. Reports this is her first time out of her home since her sx.    Reports they did an anterior hip replacement.    Reports hx of sciatica B and RTC sx on L side. Has d/c use of walker at home.    Surgery performed with Dillon Torre, f/u appointment with PA on Thursday.      Patient Occupation: [employer]  Current pain ratin  At worst pain ratin  Quality: throbbing and sharp (Pins and needles later thigh.)  Relieving factors: ice, heat, relaxation, rest, support, medications and change in position    Social Support  Lives in: one-story house  Lives with: alone    Treatments  Previous treatment: physical therapy  Patient Goals  Patient goals for therapy: increased strength, independence with ADLs/IADLs, improved balance, decreased pain, decreased edema, increased motion, return to work and return to sport/leisure activities  Patient goal: Riding bike and walking.           Objective          Tenderness     Left Hip   Tenderness in the greater trochanter.     Active Range of Motion   Left Hip   Flexion:  with pain  Extension: with pain  Abduction: with pain  Adduction: with pain    Additional Active Range of Motion Details  AROM not accurately assessed due to pain and anterior RADHA precautions. ROM is limited.    Strength/Myotome Testing     Left Hip   Planes of Motion   Flexion: 3+  Abduction: 3+    Right Hip   Planes of Motion   Flexion: 4  Abduction: 4    Left Knee   Flexion: 4-  Prone flexion: 4-    Right Knee   Flexion: 4  Prone flexion: 4    Left Ankle/Foot   Dorsiflexion: 4-  Plantar flexion: 4-    Right Ankle/Foot   Dorsiflexion: 4-  Plantar flexion: 4-    Additional Strength Details  Pain with L hip MMTs    Tests     Additional Tests Details  5x STS = 37s with UE support    TUG = 30s    Ambulation   Weight-Bearing Status   Weight-Bearing Status (Left): full weight bearing   Assistive device used: quad cane    Observational Gait   Gait: antalgic   Increased right stance time. Decreased walking speed, stride length and left stance time.     Quality of Movement During Gait   Trunk    Trunk (Left): Positive left lateral lean over stance limb.           Assessment & Plan       Assessment  Impairments: abnormal gait, abnormal muscle tone, abnormal or restricted ROM, activity intolerance, impaired balance, impaired physical strength, lacks appropriate home exercise program and pain with function   Functional limitations: walking, uncomfortable because of pain, moving in bed and sitting   Assessment details: Pt is a 70 y/o female presents with impairments following her L RADHA two weeks ago. Pt presents with pain, strength, AROM, and gait impairments affecting her functional mobility and impacting her ability to perform ADLs. Her TUG score was 30s, 5x STS test = 37s with UE support, and WOMAC score = 67/96. She uses a cane to ambulate and demonstrates an antalgic gait pattern. The pt lives alone and has minimal help from her neighbors. She will benefit from skilled PT services in order to address listed impairments  and increase tolerance to normal daily activities including ADLs and recreational activities.    Prognosis: fair    Goals  Plan Goals: Short Term Goals: 6 weeks. Patient will:  1. Be independent with initial HEP  2. Be instructed in posture and body mechanics  3. Demonstrate TrA contraction during exercises in clinic without need for cueing.  4. Pt to demonstrate ability to ambulate without use of cane.  5. Pt to reports 25% decrease in pain to promote ability to perform pain free ADLs.    Long Term Goals: 12 weeks. Patient will:  1. Demonstrate improved Left lower extremity MMT of >/= 4+/5 to allow for return to recreational/daily activities without increased pain.  2. Demonstrate normal lower extremity flexibility to allow for walking/ADLs/performance of household tasks without pain.  3. Have no soft tissue restriction in involved musculature.  4. Report pain of </= 3/10 with all daily activities.  6. TUG score < = 9 seconds to decrease risk of falls and improve ADL independence.  7. Be independent with long term HEP    Plan  Therapy options: will be seen for skilled therapy services  Planned modality interventions: cryotherapy, electrical stimulation/Russian stimulation, thermotherapy (hydrocollator packs) and ultrasound  Planned therapy interventions: abdominal trunk stabilization, ADL retraining, balance/weight-bearing training, body mechanics training, flexibility, functional ROM exercises, gait training, home exercise program, joint mobilization, manual therapy, neuromuscular re-education, soft tissue mobilization, spinal/joint mobilization, strengthening, stretching and therapeutic activities  Frequency: 2x week  Duration in weeks: 12  Treatment plan discussed with: patient            Timed:         Manual Therapy:         mins  55143;     Therapeutic Exercise:    15     mins  38657;     Neuromuscular Tiki:    10    mins  94499;    Therapeutic Activity:     10     mins  65898;     Gait Training:            mins  53005;     Ultrasound:          mins  41176;    Ionto                                   mins   79459  Self Care                            mins   35775  Canalith Repos         mins 08913      Un-Timed:  Electrical Stimulation:         mins  78325 ( );  Dry Needling          mins self-pay  Traction          mins 69650  Low Eval     25     Mins  11523  Mod Eval          Mins  34826  High Eval                            Mins  52867        Timed Treatment:   35   mins   Total Treatment:     60   mins          PT: Haris Clayton PT     Temporary License Number:   Electronically signed by Haris Clayton PT, 07/22/24, 1:40 PM EDT    Certification Period: 7/22/2024 thru 10/19/2024  I certify that the therapy services are furnished while this patient is under my care.  The services outlined above are required by this patient, and will be reviewed every 90 days.         Physician Signature:__________________________________________________    PHYSICIAN: Delta Lockhart MD  NPI: [id number]                                      DATE:      Please sign and return via fax to [email or web address] . Thank you, [facility].

## 2024-07-25 NOTE — PROGRESS NOTES
I have reviewed the notes, assessments, and/or procedures performed by Haris Clayton, I concur with her/his documentation.    Graciela Lui, PT

## 2024-07-26 ENCOUNTER — TREATMENT (OUTPATIENT)
Dept: PHYSICAL THERAPY | Facility: CLINIC | Age: 69
End: 2024-07-26
Payer: COMMERCIAL

## 2024-07-26 DIAGNOSIS — M62.81 GENERALIZED MUSCLE WEAKNESS: ICD-10-CM

## 2024-07-26 DIAGNOSIS — Z96.649 S/P REVISION OF TOTAL HIP: Primary | ICD-10-CM

## 2024-07-26 DIAGNOSIS — R26.89 ANTALGIC GAIT: ICD-10-CM

## 2024-07-26 DIAGNOSIS — G89.18 POST-OP PAIN: ICD-10-CM

## 2024-07-26 NOTE — PROGRESS NOTES
Physical Therapy Daily Treatment Note  [facility] [place]   [address]  [place] [address]  P: [phone]  F: [phone]    Patient: Kelsey Nicholson   : 1955  Referring practitioner: Delta Lockhart MD  Date of Initial Visit: Type: THERAPY  Noted: 2024  Today's Date: 2024  Patient seen for 2 sessions       Visit Diagnoses:    ICD-10-CM ICD-9-CM   1. S/P revision of total hip  Z96.649 V43.64   2. Post-op pain  G89.18 338.18   3. Antalgic gait  R26.89 781.2   4. Generalized muscle weakness  M62.81 728.87         Kelsey Nicholson reports: change pain meds, pain level /10 today in L hip. Reports continued swelling and numbness in L hip.    Subjective     Objective   See Exercise, Manual, and Modality Logs for complete treatment.       Assessment:  Pt continues to display pain with L hip motions as she is < 3 weeks post L RADHA. She ambulates with a cane and sometimes displays an antalgic gait with stance on the L. Her HEP was progressed today with additional LE strengthening. Continued skilled PT services are indicated.      Plan:  Progress per POC.        Timed:         Manual Therapy:         mins  41490;     Therapeutic Exercise:    20     mins  78554;     Neuromuscular Tiki:        mins  46837;    Therapeutic Activity:     15     mins  50965;     Gait Training:      10     mins  99628;     Ultrasound:          mins  21591;    Ionto                                   mins  47516  Self Care                            mins  95716  Traction          mins 31213      Un-Timed:  Canalith Repos         mins 94304  Electrical Stimulation:         mins  01259 ( );  Dry Needling          mins self-pay  Traction          mins 11836        Timed Treatment:   45   mins   Total Treatment:     55   mins    Haris Clayton PT  Temporary [place] Permit #: [id number]    Physical Therapist

## 2024-07-29 ENCOUNTER — TREATMENT (OUTPATIENT)
Dept: PHYSICAL THERAPY | Facility: CLINIC | Age: 69
End: 2024-07-29
Payer: COMMERCIAL

## 2024-07-29 DIAGNOSIS — R26.89 ANTALGIC GAIT: ICD-10-CM

## 2024-07-29 DIAGNOSIS — M62.81 GENERALIZED MUSCLE WEAKNESS: ICD-10-CM

## 2024-07-29 DIAGNOSIS — G89.18 POST-OP PAIN: ICD-10-CM

## 2024-07-29 DIAGNOSIS — Z96.649 S/P REVISION OF TOTAL HIP: Primary | ICD-10-CM

## 2024-07-29 PROCEDURE — 97530 THERAPEUTIC ACTIVITIES: CPT | Performed by: PHYSICAL THERAPIST

## 2024-07-29 PROCEDURE — 97116 GAIT TRAINING THERAPY: CPT | Performed by: PHYSICAL THERAPIST

## 2024-07-29 PROCEDURE — 97110 THERAPEUTIC EXERCISES: CPT | Performed by: PHYSICAL THERAPIST

## 2024-07-29 NOTE — PROGRESS NOTES
Physical Therapy Daily Treatment Note  [facility] [place]   [address]  [place] [address]  P: [phone]  F: [phone]    Patient: Kelsey Nicholson   : 1955  Referring practitioner: Delta Lockhart MD  Date of Initial Visit: Type: THERAPY  Noted: 2024  Today's Date: 2024  Patient seen for 3 sessions       Visit Diagnoses:    ICD-10-CM ICD-9-CM   1. S/P revision of total hip  Z96.649 V43.64   2. Post-op pain  G89.18 338.18   3. Antalgic gait  R26.89 781.2   4. Generalized muscle weakness  M62.81 728.87         Kelsey Nicholson reports: no new complaints. 5/10 pain today. Continued numbness and tightness at surgical sight.    Subjective     Objective   See Exercise, Manual, and Modality Logs for complete treatment.       Assessment:  Pt tolerated today's treatment session despite experiencing moderate pain during treatment. Pt continues to display limitations including L hip strength, ROM, and pain with activity. Pt will benefit from continued skilled PT services.       Plan:  Progress per POC.         Timed:         Manual Therapy:         mins  68016;     Therapeutic Exercise:    20     mins  91160;     Neuromuscular Tiki:        mins  76034;    Therapeutic Activity:     15     mins  94836;     Gait Trainin     mins  37046;     Ultrasound:          mins  60268;    Ionto                                   mins  17837  Self Care                            mins  70777  Traction          mins 82312      Un-Timed:  Canalith Repos         mins 02774  Electrical Stimulation:         mins  78445 ( );  Dry Needling          mins self-pay  Traction          mins 01865        Timed Treatment:   43   mins   Total Treatment:     53   mins    Haris Clayton PT  Temporary [place] Permit #: [id number]    Physical Therapist

## 2024-08-01 ENCOUNTER — TREATMENT (OUTPATIENT)
Dept: PHYSICAL THERAPY | Facility: CLINIC | Age: 69
End: 2024-08-01
Payer: COMMERCIAL

## 2024-08-01 DIAGNOSIS — Z96.649 S/P REVISION OF TOTAL HIP: Primary | ICD-10-CM

## 2024-08-01 DIAGNOSIS — M62.81 GENERALIZED MUSCLE WEAKNESS: ICD-10-CM

## 2024-08-01 DIAGNOSIS — G89.18 POST-OP PAIN: ICD-10-CM

## 2024-08-01 DIAGNOSIS — R26.89 ANTALGIC GAIT: ICD-10-CM

## 2024-08-01 NOTE — PROGRESS NOTES
Physical Therapy Daily Treatment Note  [facility] Therapy [place]   [address]  [place] [address]  P: [phone]  F: [phone]    Patient: Kelsey Nicholson   : 1955  Referring practitioner: Delta Lockhart MD  Date of Initial Visit: Type: THERAPY  Noted: 2024  Today's Date: 2024  Patient seen for 4 sessions       Visit Diagnoses:    ICD-10-CM ICD-9-CM   1. S/P revision of total hip  Z96.649 V43.64   2. Post-op pain  G89.18 338.18   3. Antalgic gait  R26.89 781.2   4. Generalized muscle weakness  M62.81 728.87         Kelsey Nicholson reports: 5/10 pain today at L hip.    Subjective     Objective   See Exercise, Manual, and Modality Logs for complete treatment.       Assessment:  Pt tolerated today's treatment session well with no adverse responses during treatment session. Pt continues to display limitations including decreased L LE strength, ROM, and pain with function following her recent L RADHA. Pt will benefit from continued skilled PT services.       Plan:  Progress per POC.          Timed:         Manual Therapy:         mins  19559;     Therapeutic Exercise:    20     mins  36195;     Neuromuscular Tiki:        mins  44991;    Therapeutic Activity:     15     mins  51806;     Gait Training:      10     mins  25139;     Ultrasound:          mins  10790;    Ionto                                   mins  66820  Self Care                            mins  19024  Traction          mins 58889      Un-Timed:  Canalith Repos         mins 58944  Electrical Stimulation:         mins  42574 ( );  Dry Needling          mins self-pay  Traction          mins 85822        Timed Treatment:   45   mins   Total Treatment:     55   mins    Haris Clayton, PT  Temporary [place] Permit #: [id number]    Physical Therapist

## 2024-08-05 ENCOUNTER — TREATMENT (OUTPATIENT)
Dept: PHYSICAL THERAPY | Facility: CLINIC | Age: 69
End: 2024-08-05
Payer: COMMERCIAL

## 2024-08-05 DIAGNOSIS — R26.89 ANTALGIC GAIT: ICD-10-CM

## 2024-08-05 DIAGNOSIS — Z47.1 AFTERCARE FOLLOWING LEFT HIP JOINT REPLACEMENT SURGERY: Primary | ICD-10-CM

## 2024-08-05 DIAGNOSIS — G89.18 POST-OP PAIN: ICD-10-CM

## 2024-08-05 DIAGNOSIS — M62.81 GENERALIZED MUSCLE WEAKNESS: ICD-10-CM

## 2024-08-05 DIAGNOSIS — Z96.642 AFTERCARE FOLLOWING LEFT HIP JOINT REPLACEMENT SURGERY: Primary | ICD-10-CM

## 2024-08-05 NOTE — PROGRESS NOTES
Physical Therapy Daily Treatment Note  [facility] [place]   [address]  [place] [address]  P: [phone]       F: [phone]    Patient: Kelsey Nicholson   : 1955  Diagnosis/ICD-10 Code:  Aftercare following left hip joint replacement surgery [Z47.1, Z96.642]  Referring practitioner: Delta Lockhart MD  Date of Initial Visit: Type: THERAPY  Noted: 2024  Today's Date: 2024  Patient seen for 5 sessions       Kelsey Nicholson reports: L hip has a pocket of swelling that bothers me. 5/10 pain today. Pain always is worse leaving PT, more achy. Both knees hurt as well.     Subjective     Objective   See Exercise, Manual, and Modality Logs for complete treatment.       Assessment/Plan  Subjectively, pt reports increased pain in R groin with interventions performed today. Performed well with continued muscle activation and functional strengthening interventions. Continues to demonstrate improvement of symptoms with ice post treatment session.  Continues to benefit from verbal/tactile cues to ensure proper form and technique for exercise performance.     Progress per Plan of Care           Manual Therapy:         mins  22745;  Therapeutic Exercise:    10     mins  09827;     Neuromuscular Tiki:    10    mins  14754;    Therapeutic Activity:     10     mins  32090;     Gait Trainin     mins  03446;     Ultrasound:          mins  60367;    Electrical Stimulation:         mins  36492;  Traction          mins 88980    Timed Treatment:   38   mins   Total Treatment:     38   mins    Esmer Pratt PTA  Physical Therapist Assistant [id number]

## 2024-08-08 ENCOUNTER — TREATMENT (OUTPATIENT)
Dept: PHYSICAL THERAPY | Facility: CLINIC | Age: 69
End: 2024-08-08
Payer: COMMERCIAL

## 2024-08-08 DIAGNOSIS — R26.89 ANTALGIC GAIT: ICD-10-CM

## 2024-08-08 DIAGNOSIS — Z96.649 S/P REVISION OF TOTAL HIP: Primary | ICD-10-CM

## 2024-08-08 DIAGNOSIS — M62.81 GENERALIZED MUSCLE WEAKNESS: ICD-10-CM

## 2024-08-08 DIAGNOSIS — G89.18 POST-OP PAIN: ICD-10-CM

## 2024-08-15 ENCOUNTER — TREATMENT (OUTPATIENT)
Dept: PHYSICAL THERAPY | Facility: CLINIC | Age: 69
End: 2024-08-15
Payer: COMMERCIAL

## 2024-08-15 DIAGNOSIS — M62.81 GENERALIZED MUSCLE WEAKNESS: ICD-10-CM

## 2024-08-15 DIAGNOSIS — G89.18 POST-OP PAIN: ICD-10-CM

## 2024-08-15 DIAGNOSIS — Z96.649 S/P REVISION OF TOTAL HIP: Primary | ICD-10-CM

## 2024-08-15 DIAGNOSIS — Z47.1 AFTERCARE FOLLOWING LEFT HIP JOINT REPLACEMENT SURGERY: ICD-10-CM

## 2024-08-15 DIAGNOSIS — R26.89 ANTALGIC GAIT: ICD-10-CM

## 2024-08-15 DIAGNOSIS — Z96.642 AFTERCARE FOLLOWING LEFT HIP JOINT REPLACEMENT SURGERY: ICD-10-CM

## 2024-08-15 NOTE — PROGRESS NOTES
Physical Therapy Daily Treatment Note  [facility] [place]   [address]  [place] [address]  P: [phone]  F: [phone]    Patient: Kelsey Nicholson   : 1955  Referring practitioner: Delta Lockhart MD  Date of Initial Visit: Type: THERAPY  Noted: 2024  Today's Date: 8/15/2024  Patient seen for 7 sessions       Visit Diagnoses:    ICD-10-CM ICD-9-CM   1. S/P revision of total hip  Z96.649 V43.64   2. Post-op pain  G89.18 338.18   3. Antalgic gait  R26.89 781.2   4. Generalized muscle weakness  M62.81 728.87   5. Aftercare following left hip joint replacement surgery  Z47.1 V54.81    Z96.642 V43.64         Kelsey Nicholson reports: she canceled last appointment due to lower abdominal pain near site of sx. Reports she is feeling a little better but still has some soreness there.    Subjective     Objective   See Exercise, Manual, and Modality Logs for complete treatment.   Tenderness over L hip flexor musculature.      Assessment:  Pt tolerated today's treatment session well with no adverse responses during treatment session. Pt continues to display limitations including L hip strength deficits and impaired gait affecting her ability to perform ADLs. Pt will benefit from continued skilled PT services.       Plan:  Progress per POC.         Timed:         Manual Therapy:         mins  07944;     Therapeutic Exercise:    25     mins  24634;     Neuromuscular Tiki:        mins  81946;    Therapeutic Activity:     10     mins  81435;     Gait Training:      10     mins  54761;     Ultrasound:          mins  14550;    Ionto                                   mins  72082  Self Care                            mins  32398  Traction          mins 39316      Un-Timed:  Canalith Repos         mins 35375  Electrical Stimulation:         mins  53726 (MC );  Dry Needling          mins self-pay  Traction          mins 45755        Timed Treatment:   45   mins    Total Treatment:     55   mins    Haris Clayton, PT  Temporary [place] Permit #: [id number]    Physical Therapist

## 2024-08-19 ENCOUNTER — TREATMENT (OUTPATIENT)
Dept: PHYSICAL THERAPY | Facility: CLINIC | Age: 69
End: 2024-08-19
Payer: COMMERCIAL

## 2024-08-19 DIAGNOSIS — Z96.649 S/P REVISION OF TOTAL HIP: Primary | ICD-10-CM

## 2024-08-19 DIAGNOSIS — G89.18 POST-OP PAIN: ICD-10-CM

## 2024-08-19 DIAGNOSIS — R26.89 ANTALGIC GAIT: ICD-10-CM

## 2024-08-19 DIAGNOSIS — Z47.1 AFTERCARE FOLLOWING LEFT HIP JOINT REPLACEMENT SURGERY: ICD-10-CM

## 2024-08-19 DIAGNOSIS — Z96.642 AFTERCARE FOLLOWING LEFT HIP JOINT REPLACEMENT SURGERY: ICD-10-CM

## 2024-08-19 DIAGNOSIS — M62.81 GENERALIZED MUSCLE WEAKNESS: ICD-10-CM

## 2024-08-19 PROCEDURE — 97530 THERAPEUTIC ACTIVITIES: CPT | Performed by: PHYSICAL THERAPIST

## 2024-08-19 PROCEDURE — 97110 THERAPEUTIC EXERCISES: CPT | Performed by: PHYSICAL THERAPIST

## 2024-08-19 PROCEDURE — 97112 NEUROMUSCULAR REEDUCATION: CPT | Performed by: PHYSICAL THERAPIST

## 2024-08-19 NOTE — PROGRESS NOTES
Physical Therapy Daily Treatment Note  [facility] [place]   [address]  [place] [address]  P: [phone]  F: [phone]    Patient: Kelsey Nicholson   : 1955  Referring practitioner: Delta Lockhart MD  Date of Initial Visit: Type: THERAPY  Noted: 2024  Today's Date: 2024  Patient seen for 8 sessions       Visit Diagnoses:    ICD-10-CM ICD-9-CM   1. S/P revision of total hip  Z96.649 V43.64   2. Post-op pain  G89.18 338.18   3. Antalgic gait  R26.89 781.2   4. Generalized muscle weakness  M62.81 728.87   5. Aftercare following left hip joint replacement surgery  Z47.1 V54.81    Z96.642 V43.64         Kelsey Nicholson reports: no new changes following last treatment session.    Subjective     Objective   See Exercise, Manual, and Modality Logs for complete treatment.       Assessment:  Pt tolerated today's treatment session well with no adverse responses during treatment session. Pt continues to display limitations including L hip strength and ROM deficits following her RADHA, impacting her ability to perform ADLs. Pt will benefit from continued skilled PT services.       Plan:  Progress per POC.         Timed:         Manual Therapy:         mins  14734;     Therapeutic Exercise:    15     mins  66319;     Neuromuscular Tiki:    12    mins  08960;    Therapeutic Activity:     10     mins  67612;     Gait Training:           mins  74809;     Ultrasound:          mins  88546;    Ionto                                   mins  69473  Self Care                            mins  76031  Traction          mins 36263      Un-Timed:  Canalith Repos         mins 91988  Electrical Stimulation:         mins  04036 ( );  Dry Needling          mins self-pay  Traction          mins 08185        Timed Treatment:   37   mins   Total Treatment:     47   mins    Haris Clayton PT  [place] License #: [id number]    Physical Therapist

## 2024-08-22 ENCOUNTER — TREATMENT (OUTPATIENT)
Dept: PHYSICAL THERAPY | Facility: CLINIC | Age: 69
End: 2024-08-22
Payer: COMMERCIAL

## 2024-08-22 DIAGNOSIS — G89.18 POST-OP PAIN: ICD-10-CM

## 2024-08-22 DIAGNOSIS — M62.81 GENERALIZED MUSCLE WEAKNESS: ICD-10-CM

## 2024-08-22 DIAGNOSIS — Z47.1 AFTERCARE FOLLOWING LEFT HIP JOINT REPLACEMENT SURGERY: ICD-10-CM

## 2024-08-22 DIAGNOSIS — Z96.649 S/P REVISION OF TOTAL HIP: Primary | ICD-10-CM

## 2024-08-22 DIAGNOSIS — R26.89 ANTALGIC GAIT: ICD-10-CM

## 2024-08-22 DIAGNOSIS — Z96.642 AFTERCARE FOLLOWING LEFT HIP JOINT REPLACEMENT SURGERY: ICD-10-CM

## 2024-08-22 NOTE — PROGRESS NOTES
Physical Therapy Daily Treatment Note  [facility] Therapy [place]   [address]  [place] [address]  P: [phone]  F: [phone]    Patient: Kelsey Nicholson   : 1955  Referring practitioner: Delta Lockhart MD  Date of Initial Visit: Type: THERAPY  Noted: 2024  Today's Date: 2024  Patient seen for 9 sessions       Visit Diagnoses:    ICD-10-CM ICD-9-CM   1. S/P revision of total hip  Z96.649 V43.64   2. Post-op pain  G89.18 338.18   3. Antalgic gait  R26.89 781.2   4. Aftercare following left hip joint replacement surgery  Z47.1 V54.81    Z96.642 V43.64   5. Generalized muscle weakness  M62.81 728.87         Kelsey Nicholson reports: no new changes.    Subjective     Objective   See Exercise, Manual, and Modality Logs for complete treatment.   Slight toeing in on L at times during gait.    Assessment:  Pt tolerated today's treatment session well with no adverse responses during treatment session. Pt continues to display limitations including L hip ROM and strength deficits affecting her ability to safely perform ADLs. HEP updated. Pt will benefit from continued skilled PT services.       Plan:  Progress per POC.         Timed:         Manual Therapy:         mins  11747;     Therapeutic Exercise:    18     mins  75962;     Neuromuscular Tiki:    10    mins  22386;    Therapeutic Activity:     10     mins  66518;     Gait Training:           mins  06256;     Ultrasound:          mins  22924;    Ionto                                   mins  38922  Self Care                            mins  51390  Traction          mins 84471      Un-Timed:  Canalith Repos         mins 70933  Electrical Stimulation:         mins  96255 ( );  Dry Needling          mins self-pay  Traction          mins 38025        Timed Treatment:   38   mins   Total Treatment:     48   mins    Haris Clayton PT  [place] License #: [id number]    Physical Therapist

## 2024-08-26 ENCOUNTER — TREATMENT (OUTPATIENT)
Dept: PHYSICAL THERAPY | Facility: CLINIC | Age: 69
End: 2024-08-26
Payer: COMMERCIAL

## 2024-08-26 DIAGNOSIS — M62.81 GENERALIZED MUSCLE WEAKNESS: ICD-10-CM

## 2024-08-26 DIAGNOSIS — Z47.1 AFTERCARE FOLLOWING LEFT HIP JOINT REPLACEMENT SURGERY: ICD-10-CM

## 2024-08-26 DIAGNOSIS — R26.89 ANTALGIC GAIT: ICD-10-CM

## 2024-08-26 DIAGNOSIS — Z96.642 AFTERCARE FOLLOWING LEFT HIP JOINT REPLACEMENT SURGERY: ICD-10-CM

## 2024-08-26 DIAGNOSIS — Z96.649 S/P REVISION OF TOTAL HIP: Primary | ICD-10-CM

## 2024-08-26 DIAGNOSIS — G89.18 POST-OP PAIN: ICD-10-CM

## 2024-08-26 NOTE — PROGRESS NOTES
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 No new changes since last treatment session.  Subjective Questionnaire: WOMAC score:54/96  Clinical Progress: improved  Home Program Compliance: Yes  Treatment has included: therapeutic exercise, neuromuscular re-education, therapeutic activity, gait training, and cryotherapy       Objective          Active Range of Motion   Left Hip   Flexion: 83 degrees with pain    Right Hip   Normal active range of motion    Passive Range of Motion   Left Hip   Flexion: 110 degrees with pain  Abduction: with pain  Adduction: with pain    Right Hip   Normal passive range of motion    Additional Passive Range of Motion Details  ROM measurements limited due to pain.    Strength/Myotome Testing     Left Hip   Planes of Motion   Flexion: 4-  Abduction: 4+  Adduction: 4+    Right Hip   Normal muscle strength    Left Knee   Normal strength    Right Knee   Normal strength    Additional Strength Details  Pain with resisted MMTs on L.    Functional Assessment     Comments  TUG = 13.5s no AD    Ambulated stairs ascending/descending with L HR and supervision.      Assessment/Plan  Pt tolerated today's treatment session well with no adverse responses during treatment session. Pt continues to display limitations including L hip rom and strength deficits s/p her RADHA. Pt will benefit from continued skilled PT services.   Progress toward previous goals: Partially Met    Goals  Plan Goals: Short Term Goals: 6 weeks. Patient will:  1. Be independent with initial HEP (met)  2. Be instructed in posture and body mechanics (met)  3. Demonstrate TrA contraction during  exercises in clinic without need for cueing. (met)  4. Pt to demonstrate ability to ambulate without use of cane. (met)  5. Pt to reports 25% decrease in pain to promote ability to perform pain free ADLs. (met)     Long Term Goals: 12 weeks. Patient will:  1. Demonstrate improved Left lower extremity MMT of >/= 4+/5 to allow for return to recreational/daily activities without increased pain. (Not met)  2. Demonstrate normal lower extremity flexibility to allow for walking/ADLs/performance of household tasks without pain. (Not met)  3. Have no soft tissue restriction in involved musculature. (Not met)  4. Report pain of </= 3/10 with all daily activities. (Not met)  6. TUG score < = 9 seconds to decrease risk of falls and improve ADL independence. (Not met)  7. Be independent with long term HEP (Not met)     Recommendations: Continue as planned  Timeframe: 2 months  Prognosis to achieve goals: good    PT Signature: Haris Clayton, PT  [place] License #: [id number]    Based upon review of the patient's progress and continued therapy plan, it is my medical opinion that Kelsey Nicholson should continue physical therapy treatment at [facility]  [address] [place] [id number] [phone].    Signature: __________________________________  Delta Lockhart MD    Manual Therapy:         mins  16986;  Therapeutic Exercise:    20     mins  56467;     Neuromuscular Tiki:    10    mins  96608;    Therapeutic Activity:     10     mins  77695;     Gait Training:           mins  70899;     Ultrasound:          mins  22555;    Electrical Stimulation:         mins  34455 ( );  Dry Needling          mins self-pay    Timed Treatment:   40   mins   Total Treatment:     50   mins

## 2024-08-29 ENCOUNTER — TREATMENT (OUTPATIENT)
Dept: PHYSICAL THERAPY | Facility: CLINIC | Age: 69
End: 2024-08-29
Payer: COMMERCIAL

## 2024-08-29 DIAGNOSIS — M62.81 GENERALIZED MUSCLE WEAKNESS: ICD-10-CM

## 2024-08-29 DIAGNOSIS — R26.89 ANTALGIC GAIT: ICD-10-CM

## 2024-08-29 DIAGNOSIS — Z96.649 S/P REVISION OF TOTAL HIP: Primary | ICD-10-CM

## 2024-08-29 DIAGNOSIS — Z47.1 AFTERCARE FOLLOWING LEFT HIP JOINT REPLACEMENT SURGERY: ICD-10-CM

## 2024-08-29 DIAGNOSIS — G89.18 POST-OP PAIN: ICD-10-CM

## 2024-08-29 DIAGNOSIS — Z96.642 AFTERCARE FOLLOWING LEFT HIP JOINT REPLACEMENT SURGERY: ICD-10-CM

## 2024-08-29 NOTE — PROGRESS NOTES
Physical Therapy Daily Treatment Note  [facility] [place]   [address]  [place] [address]  P: [phone]  F: [phone]    Patient: Kelsey Nicholson   : 1955  Referring practitioner: Delta Lockhart MD  Date of Initial Visit: Type: THERAPY  Noted: 2024  Today's Date: 2024  Patient seen for 11 sessions       Visit Diagnoses:    ICD-10-CM ICD-9-CM   1. S/P revision of total hip  Z96.649 V43.64   2. Post-op pain  G89.18 338.18   3. Antalgic gait  R26.89 781.2   4. Aftercare following left hip joint replacement surgery  Z47.1 V54.81    Z96.642 V43.64   5. Generalized muscle weakness  M62.81 728.87         Kelsey Nicholson reports: no new changes. Continued swelling at L hip following HEP that returns to normal with rest.    Subjective     Objective   See Exercise, Manual, and Modality Logs for complete treatment.     No AD today.    Assessment:  Pt tolerated today's treatment session well with no adverse responses during treatment session. Pt continues to display limitations including L hip strength and ROM deficits, impacting her ability to perform ADLs. Pt will benefit from continued skilled PT services.       Plan:  Progress per POC.         Timed:         Manual Therapy:         mins  03238;     Therapeutic Exercise:    18     mins  02647;     Neuromuscular Tiki:    8    mins  32419;    Therapeutic Activity:     12     mins  05182;     Gait Training:           mins  94002;     Ultrasound:          mins  68973;    Ionto                                   mins  10953  Self Care                            mins  75337  Traction          mins 66453      Un-Timed:  Canalith Repos         mins 21955  Electrical Stimulation:         mins  94467 (MC );  Dry Needling          mins self-pay  Traction          mins 95883        Timed Treatment:   38   mins   Total Treatment:     48   mins    Haris Clayton PT  [place] License #: [id number]    Physical  Therapist

## 2024-09-03 ENCOUNTER — TREATMENT (OUTPATIENT)
Dept: PHYSICAL THERAPY | Facility: CLINIC | Age: 69
End: 2024-09-03
Payer: COMMERCIAL

## 2024-09-03 DIAGNOSIS — Z47.1 AFTERCARE FOLLOWING LEFT HIP JOINT REPLACEMENT SURGERY: ICD-10-CM

## 2024-09-03 DIAGNOSIS — R26.89 ANTALGIC GAIT: ICD-10-CM

## 2024-09-03 DIAGNOSIS — Z96.649 S/P REVISION OF TOTAL HIP: Primary | ICD-10-CM

## 2024-09-03 DIAGNOSIS — Z96.642 AFTERCARE FOLLOWING LEFT HIP JOINT REPLACEMENT SURGERY: ICD-10-CM

## 2024-09-03 DIAGNOSIS — M62.81 GENERALIZED MUSCLE WEAKNESS: ICD-10-CM

## 2024-09-03 DIAGNOSIS — G89.18 POST-OP PAIN: ICD-10-CM

## 2024-09-03 PROCEDURE — 97530 THERAPEUTIC ACTIVITIES: CPT | Performed by: PHYSICAL THERAPIST

## 2024-09-03 PROCEDURE — 97110 THERAPEUTIC EXERCISES: CPT | Performed by: PHYSICAL THERAPIST

## 2024-09-03 PROCEDURE — 97116 GAIT TRAINING THERAPY: CPT | Performed by: PHYSICAL THERAPIST

## 2024-09-03 NOTE — PROGRESS NOTES
Physical Therapy Daily Treatment Note  [facility] [place]   [address]  [place] [address]  P: [phone]  F: [phone]    Patient: Kelsey Nicholson   : 1955  Referring practitioner: Delta Lockhart MD  Date of Initial Visit: Type: THERAPY  Noted: 2024  Today's Date: 9/3/2024  Patient seen for 12 sessions       Visit Diagnoses:    ICD-10-CM ICD-9-CM   1. S/P revision of total hip  Z96.649 V43.64   2. Post-op pain  G89.18 338.18   3. Antalgic gait  R26.89 781.2   4. Aftercare following left hip joint replacement surgery  Z47.1 V54.81    Z96.642 V43.64   5. Generalized muscle weakness  M62.81 728.87         Kelsey Nicholson reports: she was very sore following last treatment session. Soreness lasted into the next day.    Subjective     Objective   See Exercise, Manual, and Modality Logs for complete treatment.       Assessment:  Pt tolerated today's treatment session well with no adverse responses during treatment session. Pt continues to display limitations including L hip ROM and strength deficits impacting her ability to perform ADLs safely. Pt will benefit from continued skilled PT services.       Plan:  Progress per POC.         Timed:         Manual Therapy:         mins  23166;     Therapeutic Exercise:    14     mins  82179;     Neuromuscular Tiki:        mins  18000;    Therapeutic Activity:     10     mins  02026;     Gait Training:      10     mins  85482;     Ultrasound:          mins  65702;    Ionto                                   mins  47214  Self Care                            mins  12844  Traction          mins 60046      Un-Timed:  Canalith Repos         mins 51022  Electrical Stimulation:         mins  87316 ( );  Dry Needling          mins self-pay  Traction          mins 06367        Timed Treatment:   34   mins   Total Treatment:     34   mins    Haris Clayton PT  [place] License #: [id number]    Physical Therapist

## 2024-09-06 ENCOUNTER — TREATMENT (OUTPATIENT)
Dept: PHYSICAL THERAPY | Facility: CLINIC | Age: 69
End: 2024-09-06
Payer: COMMERCIAL

## 2024-09-06 DIAGNOSIS — Z96.649 S/P REVISION OF TOTAL HIP: Primary | ICD-10-CM

## 2024-09-06 DIAGNOSIS — R26.89 ANTALGIC GAIT: ICD-10-CM

## 2024-09-06 DIAGNOSIS — Z96.642 AFTERCARE FOLLOWING LEFT HIP JOINT REPLACEMENT SURGERY: ICD-10-CM

## 2024-09-06 DIAGNOSIS — M62.81 GENERALIZED MUSCLE WEAKNESS: ICD-10-CM

## 2024-09-06 DIAGNOSIS — G89.18 POST-OP PAIN: ICD-10-CM

## 2024-09-06 DIAGNOSIS — Z47.1 AFTERCARE FOLLOWING LEFT HIP JOINT REPLACEMENT SURGERY: ICD-10-CM

## 2024-09-06 NOTE — PROGRESS NOTES
Physical Therapy Daily Treatment Note  [facility] [place]   [address]  [place] [address]  P: [phone]  F: [phone]    Patient: Kelsey Nicholson   : 1955  Referring practitioner: Delta Lockhart MD  Date of Initial Visit: Type: THERAPY  Noted: 2024  Today's Date: 2024  Patient seen for 13 sessions       Visit Diagnoses:    ICD-10-CM ICD-9-CM   1. S/P revision of total hip  Z96.649 V43.64   2. Post-op pain  G89.18 338.18   3. Antalgic gait  R26.89 781.2   4. Aftercare following left hip joint replacement surgery  Z47.1 V54.81    Z96.642 V43.64   5. Generalized muscle weakness  M62.81 728.87         Kelsey Nicholson reports: no new changes.    Subjective     Objective   See Exercise, Manual, and Modality Logs for complete treatment.       Assessment:  Pt tolerated today's treatment session well with no adverse responses during treatment session. Pt continues to display limitations including L hip strength deficits impacting her gait and ability to safely perform ADLs. Pt will benefit from continued skilled PT services.       Plan:  Progress per POC.          Timed:         Manual Therapy:         mins  16794;     Therapeutic Exercise:    13     mins  81237;     Neuromuscular Tiki:    10    mins  67268;    Therapeutic Activity:     10     mins  23551;     Gait Training:           mins  83041;     Ultrasound:          mins  09763;    Ionto                                   mins  30146  Self Care                            mins  96783  Traction          mins 02182      Un-Timed:  Canalith Repos         mins 05738  Electrical Stimulation:         mins  80465 ( );  Dry Needling          mins self-pay  Traction          mins 67060        Timed Treatment:   33   mins   Total Treatment:     43   mins    Haris Clayton PT  [place] License #: [id number]    Physical Therapist

## 2024-09-09 ENCOUNTER — TREATMENT (OUTPATIENT)
Dept: PHYSICAL THERAPY | Facility: CLINIC | Age: 69
End: 2024-09-09
Payer: COMMERCIAL

## 2024-09-09 DIAGNOSIS — M62.81 GENERALIZED MUSCLE WEAKNESS: ICD-10-CM

## 2024-09-09 DIAGNOSIS — Z96.642 AFTERCARE FOLLOWING LEFT HIP JOINT REPLACEMENT SURGERY: ICD-10-CM

## 2024-09-09 DIAGNOSIS — Z96.649 S/P REVISION OF TOTAL HIP: Primary | ICD-10-CM

## 2024-09-09 DIAGNOSIS — Z47.1 AFTERCARE FOLLOWING LEFT HIP JOINT REPLACEMENT SURGERY: ICD-10-CM

## 2024-09-09 DIAGNOSIS — R26.89 ANTALGIC GAIT: ICD-10-CM

## 2024-09-09 DIAGNOSIS — G89.18 POST-OP PAIN: ICD-10-CM

## 2024-09-09 PROCEDURE — 97530 THERAPEUTIC ACTIVITIES: CPT | Performed by: PHYSICAL THERAPIST

## 2024-09-09 PROCEDURE — 97110 THERAPEUTIC EXERCISES: CPT | Performed by: PHYSICAL THERAPIST

## 2024-09-09 PROCEDURE — 97112 NEUROMUSCULAR REEDUCATION: CPT | Performed by: PHYSICAL THERAPIST

## 2024-09-09 NOTE — PROGRESS NOTES
Physical Therapy Daily Treatment Note  [facility] [place]   [address]  [place] [address]  P: [phone]  F: [phone]    Patient: Kelsey Nicholson   : 1955  Referring practitioner: Delta Lockhart MD  Date of Initial Visit: Type: THERAPY  Noted: 2024  Today's Date: 2024  Patient seen for 14 sessions       Visit Diagnoses:    ICD-10-CM ICD-9-CM   1. S/P revision of total hip  Z96.649 V43.64   2. Post-op pain  G89.18 338.18   3. Antalgic gait  R26.89 781.2   4. Aftercare following left hip joint replacement surgery  Z47.1 V54.81    Z96.642 V43.64   5. Generalized muscle weakness  M62.81 728.87         Kelsey Nicholson reports: some pain at her scar site, causing shooting pains at rest.    Subjective     Objective   See Exercise, Manual, and Modality Logs for complete treatment.       Assessment:  Pt tolerated today's treatment session well with no adverse responses during treatment session. Pt continues to display limitations including L hip ROM and strength deficits impacting her gait and ability to perform ADLs. Pt will benefit from continued skilled PT services.       Plan:  Progress per POC.         Timed:         Manual Therapy:         mins  95780;     Therapeutic Exercise:    23     mins  10925;     Neuromuscular Tiki:    8    mins  99760;    Therapeutic Activity:     11     mins  95582;     Gait Training:           mins  61782;     Ultrasound:          mins  33710;    Ionto                                   mins  72221  Self Care                            mins  48450  Traction          mins 01823      Un-Timed:  Canalith Repos         mins 51713  Electrical Stimulation:         mins  35020 ( );  Dry Needling          mins self-pay  Traction          mins 33035        Timed Treatment:   42   mins   Total Treatment:     52   mins    Haris Clayton PT  [place] License #: [id number]    Physical Therapist

## 2024-09-12 ENCOUNTER — TREATMENT (OUTPATIENT)
Dept: PHYSICAL THERAPY | Facility: CLINIC | Age: 69
End: 2024-09-12
Payer: COMMERCIAL

## 2024-09-12 DIAGNOSIS — R26.89 ANTALGIC GAIT: ICD-10-CM

## 2024-09-12 DIAGNOSIS — Z96.642 AFTERCARE FOLLOWING LEFT HIP JOINT REPLACEMENT SURGERY: ICD-10-CM

## 2024-09-12 DIAGNOSIS — Z96.649 S/P REVISION OF TOTAL HIP: Primary | ICD-10-CM

## 2024-09-12 DIAGNOSIS — Z47.1 AFTERCARE FOLLOWING LEFT HIP JOINT REPLACEMENT SURGERY: ICD-10-CM

## 2024-09-12 DIAGNOSIS — M62.81 GENERALIZED MUSCLE WEAKNESS: ICD-10-CM

## 2024-09-12 DIAGNOSIS — G89.18 POST-OP PAIN: ICD-10-CM

## 2024-09-12 NOTE — PROGRESS NOTES
Physical Therapy Daily Treatment Note  [facility] [place]   [address]  [place] [address]  P: [phone]  F: [phone]    Patient: Kelsey Nicholson   : 1955  Referring practitioner: Delta Lockhart MD  Date of Initial Visit: Type: THERAPY  Noted: 2024  Today's Date: 2024  Patient seen for 15 sessions       Visit Diagnoses:    ICD-10-CM ICD-9-CM   1. S/P revision of total hip  Z96.649 V43.64   2. Post-op pain  G89.18 338.18   3. Antalgic gait  R26.89 781.2   4. Aftercare following left hip joint replacement surgery  Z47.1 V54.81    Z96.642 V43.64   5. Generalized muscle weakness  M62.81 728.87         Kelsey Nicholson reports: soreness following last treatment session.    Subjective     Objective   See Exercise, Manual, and Modality Logs for complete treatment.       Assessment:  Pt tolerated today's treatment session well with no adverse responses during treatment session. Pt continues to display limitations including  L hip ROM and strength deficits impacting her gait and ability to perform ADLs. Pt will benefit from continued skilled PT services.       Plan:  Progress per POC.         Timed:         Manual Therapy:         mins  25104;     Therapeutic Exercise:    15     mins  53552;     Neuromuscular Tiki:    10    mins  97472;    Therapeutic Activity:     15     mins  75942;     Gait Training:           mins  99560;     Ultrasound:          mins  60360;    Ionto                                   mins  03862  Self Care                            mins  96563  Traction          mins 94604      Un-Timed:  Canalith Repos         mins 47115  Electrical Stimulation:         mins  46954 ( );  Dry Needling          mins self-pay  Traction          mins 97667        Timed Treatment:   40   mins   Total Treatment:     50   mins    Haris Clayton PT  [place] License #: [id number]    Physical Therapist

## 2024-09-13 ENCOUNTER — OFFICE VISIT (OUTPATIENT)
Dept: SLEEP MEDICINE | Facility: HOSPITAL | Age: 69
End: 2024-09-13
Payer: COMMERCIAL

## 2024-09-13 VITALS
DIASTOLIC BLOOD PRESSURE: 76 MMHG | OXYGEN SATURATION: 99 % | WEIGHT: 201 LBS | HEIGHT: 65 IN | BODY MASS INDEX: 33.49 KG/M2 | HEART RATE: 68 BPM | SYSTOLIC BLOOD PRESSURE: 121 MMHG

## 2024-09-13 DIAGNOSIS — G47.33 OBSTRUCTIVE SLEEP APNEA: Primary | ICD-10-CM

## 2024-09-13 DIAGNOSIS — E66.9 OBESITY (BMI 30-39.9): ICD-10-CM

## 2024-09-13 PROCEDURE — G0463 HOSPITAL OUTPT CLINIC VISIT: HCPCS

## 2024-09-13 NOTE — PROGRESS NOTES
[facility]  Telephone: [phone] / Fax: [phone] [place]  Telephone: [phone] / Fax: [phone] Shalonda Ramos    PCP: Elizabeth Dimas MD    Reason for visit: DERRICK f/u    Kelsey Nicholson is a 69 y.o.female  was last seen at [facility] sleep lab in November 2023. She wakes up at 2am with significant dryness as humidifier on the machine runs out of water. It has not been working properly.  She is eligible for a new machine this year. Her machine is over 5 years old. Her bedtime schedule is 8:30pm-6:30am. ESS is 3. So far treatment of DERRICK has been very beneficial.    SH- no tobacco, no alcohol, 1 caffeine.    ROS- negative.    DME [facility]    Current Medications:    Current Outpatient Medications:     Ascorbic Acid (VITAMIN C) 1000 MG tablet, Take 1 tablet by mouth Daily., Disp: , Rfl:     B Complex-C-E-Zn (B COMPLEX-C-E-ZINC) tablet, Take 1 tablet by mouth Daily., Disp: , Rfl:     Biotin 1 MG capsule,  Oral, Daily, 0 Refill(s), Disp: , Rfl:     EPINEPHrine (EPIPEN) 0.3 MG/0.3ML solution auto-injector injection, 0.3 mL 1 (One) Time. Inject intramuscularly as directed, Disp: , Rfl:     estradiol (ESTRACE) 0.1 MG/GM vaginal cream, PLACE 1 GRAM VAGINALLY QHS FOR 1 WEEK THEN TWICE WEEKLY, Disp: , Rfl: 1    fluticasone (FLONASE) 50 MCG/ACT nasal spray, SHAKE WELL AND USE 2 SPRAYS IN EACH NOSTRIL DAILY, Disp: 16 g, Rfl: 11    folic acid (FOLVITE) 1 MG tablet, Take 1 tablet by mouth Daily., Disp: , Rfl:     hydroCHLOROthiazide (HYDRODIURIL) 25 MG tablet, Take 1 tablet by mouth Daily., Disp: 90 tablet, Rfl: 3    lisinopril (PRINIVIL,ZESTRIL) 20 MG tablet, Take 1 tablet by mouth Daily., Disp: 90 tablet, Rfl: 3    Omega-3 Fatty Acids (fish oil) 1000 MG capsule capsule,  Oral, Daily, 0 Refill(s), Disp: , Rfl:     potassium chloride (KLOR-CON M20) 20 MEQ CR tablet, Take 1 tablet by mouth 2 (Two) Times a Day., Disp: 180 tablet, Rfl: 3    vitamin B-12 (CYANOCOBALAMIN) 100 MCG tablet, Take 0.5 tablets  "by mouth Daily., Disp: , Rfl:    also entered in Sleep Questionnaire    Patient  has a past medical history of Abdominal pain, Cough, Daytime somnolence, GERD (gastroesophageal reflux disease), Hyperlipidemia, Hyperplastic colon polyp, Hypertension, Irritable bowel syndrome, Mitral valve prolapse (2000), Osteoarthritis, Pain of left thumb, PUD (peptic ulcer disease) (02/23/2020), Pure hypercholesterolemia, and Snoring.    I have reviewed the Past Medical History, Past Surgical History, Social History and Family History.    Vital Signs /76   Pulse 68   Ht 165.1 cm (65\")   Wt 91.2 kg (201 lb)   SpO2 99%   BMI 33.45 kg/m²  Body mass index is 33.45 kg/m².    General Alert and oriented. No acute distress noted   Pharynx/Throat Class  IV  Mallampati airway, large tongue, no evidence of redundant lateral pharyngeal tissue. No oral lesions. No thrush. Moist mucous membranes.   Head Normocephalic. Symmetrical. Atraumatic.    Nose No septal deviation. No drainage   Chest Wall Normal shape. Symmetric expansion with respiration. No tenderness.   Neck Trachea midline, no thyromegaly or adenopathy    Lungs Clear to auscultation bilaterally. No wheezes. No rhonchi. No rales. Respirations regular, even and unlabored.   Heart Regular rhythm and normal rate. Normal S1 and S2. No murmur   Abdomen Soft, non-tender and non-distended. Normal bowel sounds. No masses.   Extremities Moves all extremities well. No edema   Psychiatric Normal mood and affect.     Testing:  Download 6/13/24-9/10/24, 100% use with average nightly use of 10 hours and 30 minutes on auto CPAP 10-15cm H2O, avg pressure is 12.3cm H2O, AHI is 3.6/hr, leak is 51.2 L.min.    Study-6/6/2019-Diagnostic findings: The patient tolerated the home sleep testing with monitoring time of 500 minutes. The data obtained make this a technically adequate study. The apnea hypopneas index(AHI) was 15.1 per sleep hour.  The AHI during supine position was 19.3 per sleep hour. " Mean heart rate of 60.8 BPM.  Snoring was noted 2.4% of sleep time. Lowest oxygen saturation during the study was 82%. Saturation below 89% was noted for 10.7 mins.     Impression:  1. Obstructive sleep apnea    2. Obesity (BMI 30-39.9)          Plan:  Order new machine from [employer]. Humidifier on current machine malfunctions. She should be eligible for an updated device.  Use same settings as her current machine-auto CPAP 10-15cm H2O.    AHI appears to be within adequate range. I reviewed download report and original sleep study report with the patient. I advised pt to contact us if PAP pressures feel excessive or insufficient.    Weight loss will be strongly beneficial to reduce the severity of sleep-disordered breathing.      Follow up with Dr. Mejia in 3 months to review response to therapy.    Thank you for allowing me to participate in your patient's care.      KRISTAL Garcia  [facility]  Phone: [phone]      Part of this note may be an electronic transcription/translation of spoken language to printed text using the Dragon Dictation System.

## 2024-09-16 ENCOUNTER — TREATMENT (OUTPATIENT)
Dept: PHYSICAL THERAPY | Facility: CLINIC | Age: 69
End: 2024-09-16
Payer: COMMERCIAL

## 2024-09-16 DIAGNOSIS — Z96.642 AFTERCARE FOLLOWING LEFT HIP JOINT REPLACEMENT SURGERY: ICD-10-CM

## 2024-09-16 DIAGNOSIS — G89.18 POST-OP PAIN: ICD-10-CM

## 2024-09-16 DIAGNOSIS — Z96.649 S/P REVISION OF TOTAL HIP: Primary | ICD-10-CM

## 2024-09-16 DIAGNOSIS — Z47.1 AFTERCARE FOLLOWING LEFT HIP JOINT REPLACEMENT SURGERY: ICD-10-CM

## 2024-09-16 DIAGNOSIS — M62.81 GENERALIZED MUSCLE WEAKNESS: ICD-10-CM

## 2024-09-16 DIAGNOSIS — R26.89 ANTALGIC GAIT: ICD-10-CM

## 2024-09-16 PROCEDURE — 97110 THERAPEUTIC EXERCISES: CPT | Performed by: PHYSICAL THERAPIST

## 2024-09-16 PROCEDURE — 97112 NEUROMUSCULAR REEDUCATION: CPT | Performed by: PHYSICAL THERAPIST

## 2024-09-16 PROCEDURE — 97530 THERAPEUTIC ACTIVITIES: CPT | Performed by: PHYSICAL THERAPIST

## 2024-11-25 DIAGNOSIS — N28.9 RENAL INSUFFICIENCY: ICD-10-CM

## 2024-11-25 DIAGNOSIS — I10 BENIGN ESSENTIAL HYPERTENSION: ICD-10-CM

## 2024-11-25 DIAGNOSIS — E78.2 MIXED HYPERLIPIDEMIA: ICD-10-CM

## 2024-11-25 DIAGNOSIS — G47.33 OSA (OBSTRUCTIVE SLEEP APNEA): ICD-10-CM

## 2024-11-25 DIAGNOSIS — I34.1 MVP (MITRAL VALVE PROLAPSE): ICD-10-CM

## 2024-11-25 RX ORDER — LISINOPRIL 20 MG/1
20 TABLET ORAL DAILY
Qty: 90 TABLET | Refills: 1 | Status: SHIPPED | OUTPATIENT
Start: 2024-11-25

## 2024-11-25 RX ORDER — HYDROCHLOROTHIAZIDE 25 MG/1
25 TABLET ORAL DAILY
Qty: 90 TABLET | Refills: 1 | Status: SHIPPED | OUTPATIENT
Start: 2024-11-25

## 2024-12-17 ENCOUNTER — OFFICE VISIT (OUTPATIENT)
Dept: SLEEP MEDICINE | Facility: HOSPITAL | Age: 69
End: 2024-12-17
Payer: COMMERCIAL

## 2024-12-17 VITALS
WEIGHT: 205 LBS | BODY MASS INDEX: 34.16 KG/M2 | HEIGHT: 65 IN | DIASTOLIC BLOOD PRESSURE: 77 MMHG | SYSTOLIC BLOOD PRESSURE: 126 MMHG | OXYGEN SATURATION: 95 % | HEART RATE: 73 BPM

## 2024-12-17 DIAGNOSIS — G47.33 OBSTRUCTIVE SLEEP APNEA, ADULT: Primary | ICD-10-CM

## 2024-12-17 DIAGNOSIS — E66.9 OBESITY (BMI 30-39.9): ICD-10-CM

## 2024-12-17 PROCEDURE — G0463 HOSPITAL OUTPT CLINIC VISIT: HCPCS

## 2024-12-17 NOTE — PROGRESS NOTES
[facility]  [address]  [address]  [facility] [place] [address] [phone]    PCP: Elizabeth Dimas MD    Reason for visit:  Sleep disorders: DERRICK    Kelsey is a 69 y.o.female who was seen in the [facility] today. She is here with new machine. She finds it works well and she likes it. She sleeps from 8:30pm to 6:15am. Using non-traditional ffm. She wakes up feeling rested and refreshed.   [place] Sleepiness Scale is 1. Caffeine 1 per day. Alcohol - per week.    Kelsey  reports that she has never smoked. She has never used smokeless tobacco.    Pertinent Positive Review of Systems of acid reflux, abdominal bloating - not due to CPAP  Rest of Review of Systems was negative as recorded in Sleep Questionnaire.    Patient  has a past medical history of Abdominal pain, Cough, Daytime somnolence, GERD (gastroesophageal reflux disease), Hyperlipidemia, Hyperplastic colon polyp, Hypertension, Irritable bowel syndrome, Mitral valve prolapse (2000), Osteoarthritis, Pain of left thumb, PUD (peptic ulcer disease) (02/23/2020), Pure hypercholesterolemia, and Snoring.     Current Medications:    Current Outpatient Medications:     Ascorbic Acid (VITAMIN C) 1000 MG tablet, Take 1 tablet by mouth Daily., Disp: , Rfl:     B Complex-C-E-Zn (B COMPLEX-C-E-ZINC) tablet, Take 1 tablet by mouth Daily., Disp: , Rfl:     Biotin 1 MG capsule,  Oral, Daily, 0 Refill(s), Disp: , Rfl:     EPINEPHrine (EPIPEN) 0.3 MG/0.3ML solution auto-injector injection, 0.3 mL 1 (One) Time. Inject intramuscularly as directed, Disp: , Rfl:     estradiol (ESTRACE) 0.1 MG/GM vaginal cream, PLACE 1 GRAM VAGINALLY QHS FOR 1 WEEK THEN TWICE WEEKLY, Disp: , Rfl: 1    fluticasone (FLONASE) 50 MCG/ACT nasal spray, SHAKE WELL AND USE 2 SPRAYS IN EACH NOSTRIL DAILY, Disp: 16 g, Rfl: 11    folic acid (FOLVITE) 1 MG tablet, Take 1 tablet by mouth Daily., Disp: , Rfl:     hydroCHLOROthiazide 25 MG tablet, Take 1 tablet by  "mouth Daily., Disp: 90 tablet, Rfl: 1    lisinopril (PRINIVIL,ZESTRIL) 20 MG tablet, Take 1 tablet by mouth Daily., Disp: 90 tablet, Rfl: 1    Omega-3 Fatty Acids (fish oil) 1000 MG capsule capsule,  Oral, Daily, 0 Refill(s), Disp: , Rfl:     potassium chloride (KLOR-CON M20) 20 MEQ CR tablet, Take 1 tablet by mouth 2 (Two) Times a Day., Disp: 180 tablet, Rfl: 3    vitamin B-12 (CYANOCOBALAMIN) 100 MCG tablet, Take 0.5 tablets by mouth Daily., Disp: , Rfl:    also entered in Sleep Questionnaire         Vital Signs: /77   Pulse 73   Ht 165.1 cm (65\")   Wt 93 kg (205 lb)   SpO2 95%   BMI 34.11 kg/m²     Body mass index is 34.11 kg/m².       Tongue: Large       Dentition: good       Pharynx: Posterior pharyngeal pillars are unable   Mallampatti: IV (only hard palate visible)        General: Alert. Cooperative. Well developed. No acute distress.             Nose: No septal deviation. No drainage.          Throat: No oral lesions. No thrush. Moist mucous membranes.           Lungs:  Clear to auscultation bilaterally.            Heart:  Regular rhythm and normal rate. No murmur.     Diagnostic data available to date is as below and was reviewed on current visit:  6/6/19  The patient tolerated the home sleep testing with monitoring time of 500 minutes. The data obtained make this a technically adequate study. The apnea hypopneas index(AHI) was 15.1 per sleep hour.  The AHI during supine position was 19.3 per sleep hour. Mean heart rate of 60.8 BPM.  Snoring was noted 2.4% of sleep time. Lowest oxygen saturation during the study was 82%. Saturation below 89% was noted for 10.7 mins.     No results found for: \"IRON\", \"TIBC\", \"FERRITIN\"    Most current available usage data reviewed on 12/17/2024:        [employer]: [employer]    Prescription to [facility] [place] for replacement supplies as below:    full face mask      Description Replacement    Nasal PILLOWS      A 7034 Nasal Pillows  every 3 mth    A 7033 Repl Nasal Pillows  2 per " mth    Nasal MASK/CUSHION      A 7034 Nasal Mask/Cushion  every 3 mth    A 7032 Repl Nasal Mask/Cushion  2 per mth    Full Face MASK     x A 7030 Full Face Mask  every 3 mth   x A 7031 Repl Face Mask  1 per mth      A 4604 Heated Tubing  every 3 mth    A 7037 Standard Tubing  every 3 mth   x A 7035 Headgear  every 3 mth   x A 7046 Repl Humidifier Chamber  every 6 yrs   x A 7038 Disposable Filters  2 per mth   x A 7039 Non-disposable Filter  every 6 mth   x A 7036 Chin Strap  every 6 mth     Orders Placed This Encounter   Procedures    Pulmonary Results Scan          Impression:  1. Obstructive sleep apnea, adult    2. Obesity (BMI 30-39.9)        Plan:  Kelsey will continue with CPAP. She finds it beneficial. Bloating from gastroparesis, keep pr same    I reiterated the importance of effective treatment of obstructive sleep apnea with PAP machine.  Cardiovascular health risks of untreated sleep apnea were again reviewed.  Patient was asked to remain cautious if there is persistent hypersomnolence. The benefit of weight loss in reducing severity of obstructive sleep apnea was discussed.  Patient would benefit from adhering to a strict diet to achieve ideal BMI.     Change of PAP supplies regularly is important for effective use.  Change of cushion on the mask or plugs on nasal pillows along with disposable filters once every month and change of mask frame, tubing, headgear and Velcro straps every 6 months at the minimum was reiterated.    This patient is compliant with PAP machine and benefits from its use.  Apnea hypopneas index is corrected/improved.  Daytime hypersomnolence has resolved.     Patient will follow up in this clinic in 1 year APRN    Thank you for allowing me to participate in your patient's care.    Electronically signed by Hang Mejia MD, 12/17/24, 2:37 PM EST.    Part of this note may be an electronic transcription/translation of spoken language to printed text using the Dragon Dictation  System.

## 2025-02-18 DIAGNOSIS — N28.9 RENAL INSUFFICIENCY: ICD-10-CM

## 2025-02-18 DIAGNOSIS — I34.1 MVP (MITRAL VALVE PROLAPSE): ICD-10-CM

## 2025-02-18 DIAGNOSIS — G47.33 OSA (OBSTRUCTIVE SLEEP APNEA): ICD-10-CM

## 2025-02-18 DIAGNOSIS — I10 BENIGN ESSENTIAL HYPERTENSION: ICD-10-CM

## 2025-02-18 DIAGNOSIS — E78.2 MIXED HYPERLIPIDEMIA: ICD-10-CM

## 2025-02-18 RX ORDER — POTASSIUM CHLORIDE 1500 MG/1
20 TABLET, EXTENDED RELEASE ORAL DAILY
Qty: 90 TABLET | Refills: 2 | Status: SHIPPED | OUTPATIENT
Start: 2025-02-18

## 2025-02-18 RX ORDER — HYDROCHLOROTHIAZIDE 25 MG/1
25 TABLET ORAL DAILY
Qty: 90 TABLET | Refills: 1 | Status: SHIPPED | OUTPATIENT
Start: 2025-02-18

## 2025-02-18 NOTE — TELEPHONE ENCOUNTER
Lv 6/12/24 MKD   Nxt 6/17/25 SW   Labs 6/8/24    Return for Follow-up with APRN in 1 year and me in 2 years.

## 2025-02-18 NOTE — TELEPHONE ENCOUNTER
Lv 6/12/24 MKD   Nxt 6/17/25 SW   Labs 6/8/24    Return for Follow-up with APRN in 1 year and me in 2 years.     Protocol incorrect   SODIUM 143   EGFR >80

## 2025-03-25 ENCOUNTER — TELEPHONE (OUTPATIENT)
Dept: INTERNAL MEDICINE | Facility: CLINIC | Age: 70
End: 2025-03-25
Payer: COMMERCIAL

## 2025-03-25 DIAGNOSIS — Z00.00 HEALTHCARE MAINTENANCE: Primary | ICD-10-CM

## 2025-03-25 DIAGNOSIS — R73.01 IFG (IMPAIRED FASTING GLUCOSE): ICD-10-CM

## 2025-03-25 DIAGNOSIS — R53.83 OTHER FATIGUE: ICD-10-CM

## 2025-03-25 DIAGNOSIS — I10 BENIGN ESSENTIAL HYPERTENSION: ICD-10-CM

## 2025-03-25 DIAGNOSIS — E78.2 MIXED HYPERLIPIDEMIA: ICD-10-CM

## 2025-03-25 NOTE — TELEPHONE ENCOUNTER
Caller: Kelsey Nicholson    Relationship: Self    Best call back number: [phone]     What orders are you requesting (i.e. lab or imaging): LABS    In what timeframe would the patient need to come in: BEFORE 05/28    Where will you receive your lab/imaging services: IN OFFICE    Additional notes: PATIENT IS REQUESTING ROUTINE LABS FOR THEIR PHYSICAL. THEY WOULD ALSO LIKE TO ADD LPA CHOLESTEROL TEST. THEY WOULD LIKE TO HAVE ANY LABS THAT WOULD TEST FOR FIBROMYALGIA. PLEASE CALL TO SCHEDULE LAB APPOINTMENT WHEN ORDERS ARE POSTED

## 2025-04-21 ENCOUNTER — TELEPHONE (OUTPATIENT)
Dept: CARDIOLOGY | Age: 70
End: 2025-04-21

## 2025-04-21 NOTE — TELEPHONE ENCOUNTER
Caller: KLARISSA    Relationship: SELF    Best call back number: [phone]      What was the call regarding: PT WOULD LIKE TO BE SCHEDULED ON 6/19/25 IF POSSIBLE AS SHE IS OFF WORK THAT DAY

## 2025-05-23 DIAGNOSIS — E78.2 MIXED HYPERLIPIDEMIA: ICD-10-CM

## 2025-05-23 DIAGNOSIS — N28.9 RENAL INSUFFICIENCY: ICD-10-CM

## 2025-05-23 DIAGNOSIS — I34.1 MVP (MITRAL VALVE PROLAPSE): ICD-10-CM

## 2025-05-23 DIAGNOSIS — G47.33 OSA (OBSTRUCTIVE SLEEP APNEA): ICD-10-CM

## 2025-05-23 DIAGNOSIS — I10 BENIGN ESSENTIAL HYPERTENSION: ICD-10-CM

## 2025-05-28 ENCOUNTER — HOSPITAL ENCOUNTER (OUTPATIENT)
Dept: GENERAL RADIOLOGY | Facility: HOSPITAL | Age: 70
Discharge: HOME | End: 2025-05-28
Payer: COMMERCIAL

## 2025-05-28 ENCOUNTER — OFFICE VISIT (OUTPATIENT)
Dept: INTERNAL MEDICINE | Facility: CLINIC | Age: 70
End: 2025-05-28
Payer: COMMERCIAL

## 2025-05-28 VITALS
OXYGEN SATURATION: 98 % | HEART RATE: 65 BPM | TEMPERATURE: 98.2 F | HEIGHT: 65 IN | WEIGHT: 212 LBS | DIASTOLIC BLOOD PRESSURE: 80 MMHG | SYSTOLIC BLOOD PRESSURE: 132 MMHG | BODY MASS INDEX: 35.32 KG/M2

## 2025-05-28 DIAGNOSIS — M25.571 ACUTE RIGHT ANKLE PAIN: ICD-10-CM

## 2025-05-28 DIAGNOSIS — Z00.00 PHYSICAL EXAM, ANNUAL: Primary | ICD-10-CM

## 2025-05-28 DIAGNOSIS — E66.01 MORBID (SEVERE) OBESITY DUE TO EXCESS CALORIES: ICD-10-CM

## 2025-05-28 DIAGNOSIS — E78.2 MIXED HYPERLIPIDEMIA: ICD-10-CM

## 2025-05-28 DIAGNOSIS — R94.6 THYROID FUNCTION TEST ABNORMAL: ICD-10-CM

## 2025-05-28 DIAGNOSIS — R73.01 IFG (IMPAIRED FASTING GLUCOSE): ICD-10-CM

## 2025-05-28 DIAGNOSIS — M25.551 PAIN OF RIGHT HIP: ICD-10-CM

## 2025-05-28 PROBLEM — K31.84 GASTROPARESIS: Status: ACTIVE | Noted: 2025-05-28

## 2025-05-28 PROCEDURE — 73610 X-RAY EXAM OF ANKLE: CPT

## 2025-05-28 PROCEDURE — 73502 X-RAY EXAM HIP UNI 2-3 VIEWS: CPT

## 2025-05-28 RX ORDER — HYDROCHLOROTHIAZIDE 25 MG/1
25 TABLET ORAL DAILY
Qty: 90 TABLET | Refills: 3 | Status: SHIPPED | OUTPATIENT
Start: 2025-05-28

## 2025-05-28 RX ORDER — LISINOPRIL 20 MG/1
20 TABLET ORAL DAILY
Qty: 90 TABLET | Refills: 3 | Status: SHIPPED | OUTPATIENT
Start: 2025-05-28

## 2025-05-28 RX ORDER — CHOLECALCIFEROL (VITAMIN D3) 25 MCG
1000 TABLET ORAL DAILY
COMMUNITY

## 2025-05-28 RX ORDER — CHLORAL HYDRATE 500 MG
1000 CAPSULE ORAL
COMMUNITY

## 2025-05-28 NOTE — PATIENT INSTRUCTIONS
Calorie Counting for Weight Loss  Calories are units of energy. Your body needs a certain number of calories from food to keep going throughout the day. When you eat or drink more calories than your body needs, your body stores the extra calories mostly as fat. When you eat or drink fewer calories than your body needs, your body burns fat to get the energy it needs.  Calorie counting means keeping track of how many calories you eat and drink each day. Calorie counting can be helpful if you need to lose weight. If you eat fewer calories than your body needs, you should lose weight. Ask your health care provider what a healthy weight is for you.  For calorie counting to work, you will need to eat the right number of calories each day to lose a healthy amount of weight per week. A dietitian can help you figure out how many calories you need in a day and will suggest ways to reach your calorie goal.  A healthy amount of weight to lose each week is usually 1-2 lb (0.5-0.9 kg). This usually means that your daily calorie intake should be reduced by 500-750 calories.  Eating 1,200-1,500 calories a day can help most women lose weight.  Eating 1,500-1,800 calories a day can help most men lose weight.  What do I need to know about calorie counting?  Work with your health care provider or dietitian to determine how many calories you should get each day. To meet your daily calorie goal, you will need to:  Find out how many calories are in each food that you would like to eat. Try to do this before you eat.  Decide how much of the food you plan to eat.  Keep a food log. Do this by writing down what you ate and how many calories it had.  To successfully lose weight, it is important to balance calorie counting with a healthy lifestyle that includes regular activity.  Where do I find calorie information?    The number of calories in a food can be found on a Nutrition Facts label. If a food does not have a Nutrition Facts label, try  to look up the calories online or ask your dietitian for help.  Remember that calories are listed per serving. If you choose to have more than one serving of a food, you will have to multiply the calories per serving by the number of servings you plan to eat. For example, the label on a package of bread might say that a serving size is 1 slice and that there are 90 calories in a serving. If you eat 1 slice, you will have eaten 90 calories. If you eat 2 slices, you will have eaten 180 calories.  How do I keep a food log?  After each time that you eat, record the following in your food log as soon as possible:  What you ate. Be sure to include toppings, sauces, and other extras on the food.  How much you ate. This can be measured in cups, ounces, or number of items.  How many calories were in each food and drink.  The total number of calories in the food you ate.  Keep your food log near you, such as in a pocket-sized notebook or on an katy or website on your mobile phone. Some programs will calculate calories for you and show you how many calories you have left to meet your daily goal.  What are some portion-control tips?  Know how many calories are in a serving. This will help you know how many servings you can have of a certain food.  Use a measuring cup to measure serving sizes. You could also try weighing out portions on a kitchen scale. With time, you will be able to estimate serving sizes for some foods.  Take time to put servings of different foods on your favorite plates or in your favorite bowls and cups so you know what a serving looks like.  Try not to eat straight from a food's packaging, such as from a bag or box. Eating straight from the package makes it hard to see how much you are eating and can lead to overeating. Put the amount you would like to eat in a cup or on a plate to make sure you are eating the right portion.  Use smaller plates, glasses, and bowls for smaller portions and to prevent  overeating.  Try not to multitask. For example, avoid watching TV or using your computer while eating. If it is time to eat, sit down at a table and enjoy your food. This will help you recognize when you are full. It will also help you be more mindful of what and how much you are eating.  What are tips for following this plan?  Reading food labels  Check the calorie count compared with the serving size. The serving size may be smaller than what you are used to eating.  Check the source of the calories. Try to choose foods that are high in protein, fiber, and vitamins, and low in saturated fat, trans fat, and sodium.  Shopping  Read nutrition labels while you shop. This will help you make healthy decisions about which foods to buy.  Pay attention to nutrition labels for low-fat or fat-free foods. These foods sometimes have the same number of calories or more calories than the full-fat versions. They also often have added sugar, starch, or salt to make up for flavor that was removed with the fat.  Make a grocery list of lower-calorie foods and stick to it.  Cooking  Try to cook your favorite foods in a healthier way. For example, try baking instead of frying.  Use low-fat dairy products.  Meal planning  Use more fruits and vegetables. One-half of your plate should be fruits and vegetables.  Include lean proteins, such as chicken, turkey, and fish.  Lifestyle  Each week, aim to do one of the followin minutes of moderate exercise, such as walking.  75 minutes of vigorous exercise, such as running.  General information  Know how many calories are in the foods you eat most often. This will help you calculate calorie counts faster.  Find a way of tracking calories that works for you. Get creative. Try different apps or programs if writing down calories does not work for you.  What foods should I eat?    Eat nutritious foods. It is better to have a nutritious, high-calorie food, such as an avocado, than a food with  few nutrients, such as a bag of potato chips.  Use your calories on foods and drinks that will fill you up and will not leave you hungry soon after eating.  Examples of foods that fill you up are nuts and nut butters, vegetables, lean proteins, and high-fiber foods such as whole grains. High-fiber foods are foods with more than 5 g of fiber per serving.  Pay attention to calories in drinks. Low-calorie drinks include water and unsweetened drinks.  The items listed above may not be a complete list of foods and beverages you can eat. Contact a dietitian for more information.  What foods should I limit?  Limit foods or drinks that are not good sources of vitamins, minerals, or protein or that are high in unhealthy fats. These include:  Candy.  Other sweets.  Sodas, specialty coffee drinks, alcohol, and juice.  The items listed above may not be a complete list of foods and beverages you should avoid. Contact a dietitian for more information.  How do I count calories when eating out?  Pay attention to portions. Often, portions are much larger when eating out. Try these tips to keep portions smaller:  Consider sharing a meal instead of getting your own.  If you get your own meal, eat only half of it. Before you start eating, ask for a container and put half of your meal into it.  When available, consider ordering smaller portions from the menu instead of full portions.  Pay attention to your food and drink choices. Knowing the way food is cooked and what is included with the meal can help you eat fewer calories.  If calories are listed on the menu, choose the lower-calorie options.  Choose dishes that include vegetables, fruits, whole grains, low-fat dairy products, and lean proteins.  Choose items that are boiled, broiled, grilled, or steamed. Avoid items that are buttered, battered, fried, or served with cream sauce. Items labeled as crispy are usually fried, unless stated otherwise.  Choose water, low-fat milk,  unsweetened iced tea, or other drinks without added sugar. If you want an alcoholic beverage, choose a lower-calorie option, such as a glass of wine or light beer.  Ask for dressings, sauces, and syrups on the side. These are usually high in calories, so you should limit the amount you eat.  If you want a salad, choose a garden salad and ask for grilled meats. Avoid extra toppings such as watt, cheese, or fried items. Ask for the dressing on the side, or ask for olive oil and vinegar or lemon to use as dressing.  Estimate how many servings of a food you are given. Knowing serving sizes will help you be aware of how much food you are eating at restaurants.  Where to find more information  Centers for Disease Control and Prevention: www.cdc.gov  [place]. Department of Agriculture: myplate.gov  Summary  Calorie counting means keeping track of how many calories you eat and drink each day. If you eat fewer calories than your body needs, you should lose weight.  A healthy amount of weight to lose per week is usually 1-2 lb (0.5-0.9 kg). This usually means reducing your daily calorie intake by 500-750 calories.  The number of calories in a food can be found on a Nutrition Facts label. If a food does not have a Nutrition Facts label, try to look up the calories online or ask your dietitian for help.  Use smaller plates, glasses, and bowls for smaller portions and to prevent overeating.  Use your calories on foods and drinks that will fill you up and not leave you hungry shortly after a meal.  This information is not intended to replace advice given to you by your health care provider. Make sure you discuss any questions you have with your health care provider.  Document Revised: 01/28/2021 Document Reviewed: 01/28/2021  Elsevier Patient Education © 2023 [employer]vier Inc.

## 2025-05-28 NOTE — PROGRESS NOTES
Subjective   Kelsey Nicholson is a 70 y.o. female.   Chief Complaint   Patient presents with    Annual Exam    Fall    Ankle Pain    Hip Pain       History of Present Illness     CPE-patient is here for complete physical exam without GYN evaluation.  She complains of hips pain and ankle pain.    Fall-patient fell walking down the stairs.  She missed the last step.  She fell on the right side.  No head injury.  She was able to get up and walk.  She was able to bear weight.  No bruising, no skin lacerations.  No knee swelling.  Ankle was swollen after injury.    Right hip pain-it is sharp pain, on and off, lasting 1 to 2 minutes.  Intensity 0-8 out of 10.  Present when walking, but not always.  Worse with standing or turning.  She used tramadol and it helped some.    Right ankle pain-constant, dull and achy, intensity 7-8 out of 10.  Worse with walking.  Better with rest and tramadol.  Compression stockings help with swelling.  She has a history of fracture of right ankle that required surgical treatment.    She would like to be referred for weight management.  She has gastroparesis.  She tried multiple times to lose weight, but never successfully for long time.    Impaired fasting glucose-FBS 93, A1c 6.1 from 5.8.  BMI 35.3.    HLP-, HDL 40.  She does not tolerate statins.  She follow-up with cardiology.  She is scheduled next month.    TSH 5.9, free T4 at 1.04, TPO 13.  She is on biotin daily.    She follows up with ENT on lipoma localized on the left side of the neck.  Minimal change in size.    No new allergies to medications.  She does not use tobacco products, she drinks few drinks a year, no drugs.  She does not exercise regularly.  Last evaluation by the dentist in November 2024. last eye exam was less than a year ago-stable.  Last GYN evaluation in January 2025.  Normal Pap smear.  Mammogram-benign cysts.  She had colonoscopy in May 2022.  Next was recommended 3-5 years later.  COVID-vaccine x5,  Prevnar 20 in 2023.  Shingrix x2.  Tetanus vaccine -October 2013.    Review of Systems   Constitutional:  Negative for fever.   Respiratory:  Negative for shortness of breath.    Cardiovascular:  Negative for chest pain and palpitations.   Gastrointestinal:  Negative for blood in stool.   Genitourinary:  Negative for hematuria.   Neurological:  Negative for light-headedness.   Psychiatric/Behavioral:  Negative for suicidal ideas.          Objective   Wt Readings from Last 3 Encounters:   05/28/25 96.2 kg (212 lb)   12/17/24 93 kg (205 lb)   09/13/24 91.2 kg (201 lb)      Vitals:    05/28/25 1312   BP: 132/80   Pulse: 65   Temp: 98.2 °F (36.8 °C)   SpO2: 98%     Temp Readings from Last 3 Encounters:   05/28/25 98.2 °F (36.8 °C)   06/17/24 97.5 °F (36.4 °C)   02/15/24 98.4 °F (36.9 °C) (Temporal)     BP Readings from Last 3 Encounters:   05/28/25 132/80   12/17/24 126/77   09/13/24 121/76     Pulse Readings from Last 3 Encounters:   05/28/25 65   12/17/24 73   09/13/24 68     Body mass index is 35.28 kg/m².    Physical Exam  Vitals reviewed.   Constitutional:       General: She is not in acute distress.     Appearance: She is well-developed. She is not diaphoretic.   HENT:      Head: Normocephalic and atraumatic. Hair is normal.      Right Ear: Hearing, tympanic membrane, ear canal and external ear normal. No decreased hearing noted. No drainage.      Left Ear: Hearing, tympanic membrane, ear canal and external ear normal. No decreased hearing noted.      Nose: No nasal deformity.   Eyes:      General: Lids are normal.         Right eye: No discharge.         Left eye: No discharge.      Conjunctiva/sclera: Conjunctivae normal.      Pupils: Pupils are equal, round, and reactive to light.   Neck:      Thyroid: No thyromegaly.      Vascular: No JVD.      Trachea: No tracheal deviation.   Cardiovascular:      Rate and Rhythm: Normal rate and regular rhythm.      Pulses: Normal pulses.      Heart sounds: Normal heart  sounds. No murmur heard.     No friction rub. No gallop.   Pulmonary:      Effort: Pulmonary effort is normal. No respiratory distress.      Breath sounds: Normal breath sounds. No wheezing or rales.   Chest:      Chest wall: No tenderness.   Abdominal:      General: There is no distension.      Palpations: Abdomen is soft. There is no mass.      Tenderness: There is no abdominal tenderness. There is no guarding or rebound.      Hernia: No hernia is present.   Musculoskeletal:         General: No tenderness or deformity. Normal range of motion.      Cervical back: Normal range of motion.      Comments: TTP over right ankle.  No TTP over knee.  Limited internal rotation in both hips   Lymphadenopathy:      Cervical: No cervical adenopathy.      Upper Body:      Right upper body: No supraclavicular adenopathy.      Comments: ~2 cm left sided nodule palpable next to thyroid.  Followed by ENT.  Lipoma on ultrasound.   Skin:     General: Skin is warm and dry.      Findings: No bruising, erythema or rash.   Neurological:      Mental Status: She is alert and oriented to person, place, and time.      Cranial Nerves: No cranial nerve deficit.      Motor: No abnormal muscle tone.      Coordination: Coordination normal.      Deep Tendon Reflexes: Reflexes are normal and symmetric. Reflexes normal.   Psychiatric:         Behavior: Behavior normal.         Thought Content: Thought content normal.         Judgment: Judgment normal.         Assessment & Plan   Diagnoses and all orders for this visit:    1. Physical exam, annual (Primary)    2. IFG (impaired fasting glucose)    3. Thyroid function test abnormal  -     Thyroid Cascade Profile; Future    4. Morbid (severe) obesity due to excess calories  -     Ambulatory Referral to Bariatric Surgery    5. Pain of right hip  -     XR Hip With or Without Pelvis 2 - 3 View Right    6. Acute right ankle pain  -     XR Ankle 3+ View Right; Future    7. Mixed  hyperlipidemia        CPE-preventive counseling provided.  Blood work results reviewed with patient.  She is reminded to schedule appointment with dentist.  She is up-to-date with eye exams.  She is up-to-date with cancer screening.  Colonoscopy done in 2022 and next was recommended 3 to 5 years later.  She is going to check with Dr. Vasquez office if she is due this year or in 2 years.  Recommendations for vaccinations discussed including tetanus vaccine.  Sun protection recommended.    Obesity-increases risk for diabetes and heart disease.  Patient is interested in referral to bariatric surgery for medical management.  She has gastroparesis.  Referral done    IFG-information on prediabetic diet added to discharge summary.  Follow-up in 6 months.    Hyperlipidemia-does not tolerate statins.  We discussed starting Zetia.  She would like to discuss it first with her cardiologist.  Follow-up in 6 months.    Abnormal thyroid test-patient is on biotin.  She will hold it for 1 week and will check labs in 1 week.  Further recommendations depending on test results.    Right hip pain-we are checking x-ray.  Fall prevention discussed.    Right ankle pain-we are checking x-ray.

## 2025-05-30 ENCOUNTER — TELEPHONE (OUTPATIENT)
Dept: INTERNAL MEDICINE | Facility: CLINIC | Age: 70
End: 2025-05-30
Payer: COMMERCIAL

## 2025-05-30 NOTE — TELEPHONE ENCOUNTER
Caller: Kelsey Nicholson    Relationship: Self    Best call back number: [phone]     Caller requesting test results:      What test was performed:      When was the test performed:      Where was the test performed:      Additional notes: PATIENT  CALLED SHE HAD XRAYS DONE DOWNSTAIRS ON WEDNESDAY THIS WEEK AND WOULD LIKE THE RESULTS.  PLEASE CALL HER BACK WITH THE RESULTS.

## 2025-06-02 NOTE — TELEPHONE ENCOUNTER
Caller: Kelsey Nicholson    Relationship to patient: Self    Best call back number: [phone]     Patient is needing: AN UPDATE ON XRAY RESULTS     PATIENT WOULD LIKE A CALLBACK

## 2025-06-19 ENCOUNTER — OFFICE VISIT (OUTPATIENT)
Dept: CARDIOLOGY | Age: 70
End: 2025-06-19
Payer: COMMERCIAL

## 2025-06-19 VITALS
SYSTOLIC BLOOD PRESSURE: 112 MMHG | WEIGHT: 210 LBS | DIASTOLIC BLOOD PRESSURE: 82 MMHG | HEART RATE: 66 BPM | BODY MASS INDEX: 34.99 KG/M2 | HEIGHT: 65 IN

## 2025-06-19 DIAGNOSIS — G47.33 OSA (OBSTRUCTIVE SLEEP APNEA): ICD-10-CM

## 2025-06-19 DIAGNOSIS — N28.9 RENAL INSUFFICIENCY: ICD-10-CM

## 2025-06-19 DIAGNOSIS — E78.2 MIXED HYPERLIPIDEMIA: ICD-10-CM

## 2025-06-19 DIAGNOSIS — I10 BENIGN ESSENTIAL HYPERTENSION: ICD-10-CM

## 2025-06-19 DIAGNOSIS — I34.1 MVP (MITRAL VALVE PROLAPSE): ICD-10-CM

## 2025-06-19 PROCEDURE — 99214 OFFICE O/P EST MOD 30 MIN: CPT | Performed by: NURSE PRACTITIONER

## 2025-06-19 PROCEDURE — 93000 ELECTROCARDIOGRAM COMPLETE: CPT | Performed by: NURSE PRACTITIONER

## 2025-06-19 RX ORDER — HYDROCHLOROTHIAZIDE 25 MG/1
25 TABLET ORAL DAILY
Qty: 90 TABLET | Refills: 3 | Status: SHIPPED | OUTPATIENT
Start: 2025-06-19

## 2025-06-19 RX ORDER — POTASSIUM CHLORIDE 1500 MG/1
20 TABLET, EXTENDED RELEASE ORAL DAILY
Qty: 90 TABLET | Refills: 3 | Status: SHIPPED | OUTPATIENT
Start: 2025-06-19

## 2025-06-19 RX ORDER — LISINOPRIL 20 MG/1
20 TABLET ORAL DAILY
Qty: 90 TABLET | Refills: 3 | Status: SHIPPED | OUTPATIENT
Start: 2025-06-19

## 2025-06-19 NOTE — PROGRESS NOTES
Date of Office Visit: 2025  Encounter Provider: KRISTAL Duff  Place of Service: [facility] CARDIOLOGY  Patient Name: Kelsey Nicholson  :1955    Chief complaint  MVP with mild MR     History of Present Illness  Patient is a 70 y.o. year old female  patient of Dr. Blancas. Past medical history includes hyperlipidemia, hypertension, GE reflux.  She has a distant  questionable history of mitral valve prolapse with trivial mitral regurgitation.  In 2023 patient had an echocardiogram that showed normal systolic function with no significant valvular dysfunction stress perfusion study was negative for ischemia. She is scheduled for left hip arthroplasty in 2024.     Interval history  Patient presents today for routine follow-up.  I will visit with her today and have reviewed her medical record.  Since last visit she has been feeling well.  She is wearing CPAP consistently and follows with sleep medicine.  She denies palpitations, shortness of breath, dizziness, chest pain or chest pressure, fatigue, syncope or presyncope.  She has had some edema to her ankle as she had a recent fall.  She received an injection with podiatry today.  She was riding a stationary bike sometimes.  She denies any exertional symptoms with this.  Blood pressure at home has been as it is today.  She has not tolerated statins in the past PCP has recommended starting Zetia.    Past Medical History:   Diagnosis Date    Abdominal pain     Cough     Daytime somnolence     GERD (gastroesophageal reflux disease)     Hyperlipidemia     Hyperplastic colon polyp     Hypertension     Irritable bowel syndrome     Mitral valve prolapse     Osteoarthritis     Pain of left thumb     PUD (peptic ulcer disease) 2020    Pure hypercholesterolemia     Snoring      Past Surgical History:   Procedure Laterality Date    ANKLE SURGERY Right     BREAST BIOPSY      CHOLECYSTECTOMY  2013    COLONOSCOPY   10/16/2013    IH, Hyperplastic polyp     COLONOSCOPY  04/25/2018    Diverticulosis in the sigmoid colon and in the descending colon, colon polyp    ENDOSCOPY  10/16/2013    gastritis.      ENDOSCOPY N/A 02/03/2020    Procedure: ESOPHAGOGASTRODUODENOSCOPY WITH BIOPSIES;  Surgeon: Jose Enrique Quesada MD;  Location: [facility] ENDOSCOPY;  Service: Gastroenterology    HYSTERECTOMY      OOPHORECTOMY      PYLOROPLASTY      dr Vasquez    ROTATOR CUFF REPAIR Left 11/2014    UPPER GASTROINTESTINAL ENDOSCOPY  04/25/2018    Acute erosive gastritis    URETHRAL SUSPENSION       Outpatient Medications Prior to Visit   Medication Sig Dispense Refill    Ascorbic Acid (VITAMIN C) 1000 MG tablet Take 1 tablet by mouth Daily.      B Complex-C-E-Zn (B COMPLEX-C-E-ZINC) tablet Take 1 tablet by mouth Daily.      Cholecalciferol 25 MCG (1000 UT) tablet Take 1 tablet by mouth Daily.      EPINEPHrine (EPIPEN) 0.3 MG/0.3ML solution auto-injector injection 0.3 mL 1 (One) Time. Inject intramuscularly as directed      estradiol (ESTRACE) 0.1 MG/GM vaginal cream PLACE 1 GRAM VAGINALLY QHS FOR 1 WEEK THEN TWICE WEEKLY  1    fluticasone (FLONASE) 50 MCG/ACT nasal spray SHAKE WELL AND USE 2 SPRAYS IN EACH NOSTRIL DAILY 16 g 11    Omega-3 Fatty Acids (fish oil) 1000 MG capsule capsule Take 1 capsule by mouth Daily With Breakfast.      vitamin B-12 (CYANOCOBALAMIN) 100 MCG tablet Take 0.5 tablets by mouth Daily.      hydroCHLOROthiazide 25 MG tablet TAKE 1 TABLET BY MOUTH DAILY 90 tablet 3    lisinopril (PRINIVIL,ZESTRIL) 20 MG tablet TAKE 1 TABLET BY MOUTH DAILY 90 tablet 3    Omega-3 Fatty Acids (fish oil) 1000 MG capsule capsule   Oral, Daily, 0 Refill(s)      potassium chloride (KLOR-CON M20) 20 MEQ CR tablet TAKE 1 TABLET BY MOUTH EVERY DAY 90 tablet 2    Biotin 1 MG capsule   Oral, Daily, 0 Refill(s) (Patient not taking: Reported on 6/19/2025)      folic acid (FOLVITE) 1 MG tablet Take 1 tablet by mouth Daily. (Patient not taking: Reported on  "6/19/2025)       No facility-administered medications prior to visit.       Allergies as of 06/19/2025 - Reviewed 06/19/2025   Allergen Reaction Noted    Statins Other (See Comments) 10/20/2015    Oxycodone-acetaminophen Rash 11/28/2016    Sulfa antibiotics Rash 06/23/2014     Social History     Socioeconomic History    Marital status: Single   Tobacco Use    Smoking status: Never    Smokeless tobacco: Never   Vaping Use    Vaping status: Never Used   Substance and Sexual Activity    Alcohol use: No    Drug use: Defer    Sexual activity: Defer     Family History   Problem Relation Age of Onset    Cancer Father     Diabetes Father     Hypertension Father     Stroke Father     Heart disease Father     Liver cancer Paternal Uncle     Breast cancer Maternal Cousin      Review of Systems   Constitutional: Negative for malaise/fatigue.   Cardiovascular:  Negative for chest pain, claudication, dyspnea on exertion, leg swelling, near-syncope, orthopnea, palpitations, paroxysmal nocturnal dyspnea and syncope.   Respiratory:  Negative for shortness of breath.    Neurological:  Negative for brief paralysis, dizziness, headaches and light-headedness.   All other systems reviewed and are negative.       Objective:     Vitals:    06/19/25 1409   BP: 112/82   BP Location: Left arm   Patient Position: Sitting   Cuff Size: Large Adult   Pulse: 66   Weight: 95.3 kg (210 lb)   Height: 165.1 cm (65\")     Body mass index is 34.95 kg/m².    Vitals reviewed.   Constitutional:       General: Not in acute distress.     Appearance: Well-developed and not in distress. Not diaphoretic.   HENT:      Head: Normocephalic.   Pulmonary:      Effort: Pulmonary effort is normal. No respiratory distress.      Breath sounds: Normal breath sounds. No wheezing. No rhonchi. No rales.   Cardiovascular:      Normal rate. Regular rhythm.      Murmurs: There is no murmur.   Pulses:     Radial: 2+ bilaterally.  Edema:     Peripheral edema absent.   Skin:     " General: Skin is warm and dry. There is no cyanosis.      Findings: No rash.   Neurological:      Mental Status: Alert and oriented to person, place, and time.   Psychiatric:         Behavior: Behavior normal. Behavior is cooperative.         Thought Content: Thought content normal.         Judgment: Judgment normal.       Lab Review:     Lab Results   Component Value Date     05/22/2025     02/15/2023    K 3.8 05/22/2025    K 3.6 06/17/2024     05/22/2025     02/15/2023    CO2 27.1 05/22/2025    CO2 23 02/15/2023    BUN 13 05/22/2025    BUN 12 02/15/2023    CREATININE 1.00 05/22/2025    CREATININE 0.84 02/15/2023    EGFRIFNONA 58 (L) 02/11/2022    EGFRIFNONA 61 08/06/2021    EGFRIFAFRI >60 02/15/2023    EGFRIFAFRI >60 02/14/2023    GLUCOSE 93 05/22/2025    GLUCOSE 96 01/03/2023    CALCIUM 9.6 05/22/2025    CALCIUM 9.1 02/15/2023    ALBUMIN 4.1 05/22/2025    ALBUMIN 4.1 02/14/2023    BILITOT 0.6 05/22/2025    BILITOT 0.2 02/14/2023    AST 21 05/22/2025    AST 20 02/14/2023    ALT 25 05/22/2025    ALT 24 02/14/2023     Lab Results   Component Value Date    WBC 9.54 05/22/2025    WBC 9.11 06/08/2024    HGB 14.0 05/22/2025    HGB 13.7 06/08/2024    HCT 41.4 05/22/2025    HCT 42.2 06/08/2024    MCV 90.2 05/22/2025    MCV 92.5 06/08/2024     05/22/2025     06/08/2024     Lab Results   Component Value Date    BNP <10.0 02/14/2023     Lab Results   Component Value Date    TROPONINI <0.010 02/15/2023     Lab Results   Component Value Date    TSH 5.900 (H) 05/22/2025    TSH 2.550 07/08/2020      Lipid Panel          5/22/2025    07:12   Lipid Panel   Total Cholesterol 263    Triglycerides 173    HDL Cholesterol 40    VLDL Cholesterol 33    LDL Cholesterol  190    LDL/HDL Ratio 4.71           ECG 12 Lead    Date/Time: 6/19/2025 3:46 PM  Performed by: Debra Dixon APRN    Authorized by: Debra Dixon APRN  Comparison: compared with previous ECG   Similar to previous  ECG  Rhythm: sinus rhythm  Rate: normal  BPM: 66  QRS axis: normal  Other findings: low voltage  Comments: Similar to prior        Assessment:       Diagnosis Plan   1. Benign essential hypertension  hydroCHLOROthiazide 25 MG tablet    lisinopril (PRINIVIL,ZESTRIL) 20 MG tablet    potassium chloride (KLOR-CON M20) 20 MEQ CR tablet      2. Mixed hyperlipidemia  hydroCHLOROthiazide 25 MG tablet    lisinopril (PRINIVIL,ZESTRIL) 20 MG tablet    potassium chloride (KLOR-CON M20) 20 MEQ CR tablet      3. MVP (mitral valve prolapse)  hydroCHLOROthiazide 25 MG tablet    lisinopril (PRINIVIL,ZESTRIL) 20 MG tablet    potassium chloride (KLOR-CON M20) 20 MEQ CR tablet      4. Renal insufficiency  hydroCHLOROthiazide 25 MG tablet    lisinopril (PRINIVIL,ZESTRIL) 20 MG tablet    potassium chloride (KLOR-CON M20) 20 MEQ CR tablet      5. DERRICK (obstructive sleep apnea)  hydroCHLOROthiazide 25 MG tablet    lisinopril (PRINIVIL,ZESTRIL) 20 MG tablet    potassium chloride (KLOR-CON M20) 20 MEQ CR tablet        Plan:       Hypertension: Well-controlled on current regimen.  Refilled her medications today.  Mitral valve prolapse: noted by previous JAMES with trivial regurgitation. Echo in 2018 without prolapse and only trivial regurgitation.   Repeat echo from [facility] showed normal EF and normal mitral valve structure and function.  No murmur on exam today  Obstructive sleep apnea on CPAP. She reports compliance.  She follows with sleep medicine annually.  Dyslipidemia: PCP managing.  She has demonstrated intolerance to statins (atorvastatin and rosuvastatin) PCP has mentioned starting Zetia.  I discussed this with her today as well.  If Zetia proves ineffective or she develops side effects to this she would be a candidate for Repatha and will discuss this with PCP as well.  History of mild renal insufficiency: PCP following.  Stable on most recent labs  Obesity: As above      Time Spent: I spent 30 minutes caring for Kelsey on this  date of service. This time includes time spent by me in the following activities: preparing for the visit, reviewing tests, performing a medically appropriate examination and/or evaluations, counseling and educating the patient/family/caregiver, ordering medications, tests, or procedures, documenting information in the medical record, and independently interpreting results and communicating that information with the patient/family/caregiver.   I spent 1 minutes on the separately reported service of ECG. This time is not included in the time used to support the E/M service also reported today.        Your medication list            Accurate as of June 19, 2025  3:46 PM. If you have any questions, ask your nurse or doctor.                CONTINUE taking these medications        Instructions Last Dose Given Next Dose Due   ascorbic acid 1000 MG tablet  Commonly known as: VITAMIN C      Take 1 tablet by mouth Daily.       b complex-C-E-zinc tablet      Take 1 tablet by mouth Daily.       cholecalciferol 25 MCG (1000 UT) tablet  Commonly known as: VITAMIN D3      Take 1 tablet by mouth Daily.       EPINEPHrine 0.3 MG/0.3ML solution auto-injector injection  Commonly known as: EPIPEN      0.3 mL 1 (One) Time. Inject intramuscularly as directed       estradiol 0.1 MG/GM vaginal cream  Commonly known as: ESTRACE      PLACE 1 GRAM VAGINALLY QHS FOR 1 WEEK THEN TWICE WEEKLY       fish oil 1000 MG capsule capsule      Take 1 capsule by mouth Daily With Breakfast.       fluticasone 50 MCG/ACT nasal spray  Commonly known as: FLONASE      SHAKE WELL AND USE 2 SPRAYS IN EACH NOSTRIL DAILY       hydroCHLOROthiazide 25 MG tablet      Take 1 tablet by mouth Daily.       lisinopril 20 MG tablet  Commonly known as: PRINIVIL,ZESTRIL      Take 1 tablet by mouth Daily.       potassium chloride 20 MEQ CR tablet  Commonly known as: KLOR-CON M20      Take 1 tablet by mouth Daily.       vitamin B-12 100 MCG tablet  Commonly known as:  CYANOCOBALAMIN      Take 0.5 tablets by mouth Daily.                 Where to Get Your Medications        These medications were sent to [id number] - [place], [place] - [address] AT [facility] [address] - [phone]  - [phone] FX  [address], [facility] [address]      Phone: [phone]   hydroCHLOROthiazide 25 MG tablet  lisinopril 20 MG tablet  potassium chloride 20 MEQ CR tablet         Patient is no longer taking -.  I corrected the med list to reflect this.  I did not stop these medications.    Return in about 1 year (around 6/19/2026) for with Dr. Blancas.      Dictated utilizing Dragon dictation

## 2025-07-29 ENCOUNTER — TELEPHONE (OUTPATIENT)
Dept: INTERNAL MEDICINE | Facility: CLINIC | Age: 70
End: 2025-07-29
Payer: COMMERCIAL

## 2025-07-29 DIAGNOSIS — I10 BENIGN ESSENTIAL HYPERTENSION: ICD-10-CM

## 2025-07-29 DIAGNOSIS — I34.1 MVP (MITRAL VALVE PROLAPSE): ICD-10-CM

## 2025-07-29 DIAGNOSIS — E78.2 MIXED HYPERLIPIDEMIA: ICD-10-CM

## 2025-07-29 DIAGNOSIS — G47.33 OSA (OBSTRUCTIVE SLEEP APNEA): ICD-10-CM

## 2025-07-29 DIAGNOSIS — N28.9 RENAL INSUFFICIENCY: ICD-10-CM

## 2025-07-29 DIAGNOSIS — K31.84 GASTROPARESIS: Primary | ICD-10-CM

## 2025-07-29 RX ORDER — POTASSIUM CHLORIDE 1500 MG/1
20 TABLET, EXTENDED RELEASE ORAL DAILY
Qty: 90 TABLET | Refills: 1 | Status: SHIPPED | OUTPATIENT
Start: 2025-07-29

## 2025-07-29 NOTE — TELEPHONE ENCOUNTER
----- Message from Leesa PEÑA sent at 7/24/2025 12:12 PM EDT -----  Patient is asking for a Referral to Dr. Quesada.  She had seen him many years, then switched to Dr. Vasquez with diagnoses of IBS, gastroparesis, ulcers, digestive issues, h/o colon polyps, and GERD.  Dr. Vasquez did a double scope and she is due for a 3 year f/up of both scopes.  She has called Dr. Vasquez's office repeatedly to get scheduled, but they have never called her back.  Since she had seen Dr. Quesada in the past, she called his office to see about him doing her repeat EGD/c-scope, but they told her since they haven't seen her in more than 3 years, that Primary Care needed to put in a Referral to them.

## (undated) DEVICE — BITEBLOCK OMNI BLOC

## (undated) DEVICE — MSK PROC CURAPLEX O2 2/ADAPT 7FT

## (undated) DEVICE — FRCP BX RADJAW4 NDL 2.8 240CM LG OG BX40

## (undated) DEVICE — SENSR O2 OXIMAX FNGR A/ 18IN NONSTR

## (undated) DEVICE — CANN O2 ETCO2 FITS ALL CONN CO2 SMPL A/ 7IN DISP LF

## (undated) DEVICE — ADAPT CLN BIOGUARD AIR/H2O DISP

## (undated) DEVICE — KT ORCA ORCAPOD DISP STRL

## (undated) DEVICE — TUBING, SUCTION, 1/4" X 10', STRAIGHT: Brand: MEDLINE